# Patient Record
Sex: FEMALE | Race: WHITE | NOT HISPANIC OR LATINO | Employment: OTHER | URBAN - METROPOLITAN AREA
[De-identification: names, ages, dates, MRNs, and addresses within clinical notes are randomized per-mention and may not be internally consistent; named-entity substitution may affect disease eponyms.]

---

## 2017-01-24 RX ORDER — ESTRADIOL 0.1 MG/D
FILM, EXTENDED RELEASE TRANSDERMAL
Qty: 8 PATCH | Refills: 11 | Status: SHIPPED | OUTPATIENT
Start: 2017-01-24 | End: 2018-01-11 | Stop reason: SDUPTHER

## 2017-01-24 NOTE — TELEPHONE ENCOUNTER
----- Message from Fatoumata Medellin sent at 1/24/2017 11:14 AM CST -----  Contact: [phone]  Pt is asking for her rx of Vivelle-Dot  0.1 MG Patch be sent in to     [facility] [place] [facility]  [address] [facility] [address] [place] [address]  Phone: [phone]   Fax: [phone]    Pt is also requesting that in the future that all her rx be sent to this pharmacy/thanks

## 2017-01-25 ENCOUNTER — TELEPHONE (OUTPATIENT)
Dept: FAMILY MEDICINE | Facility: CLINIC | Age: 72
End: 2017-01-25

## 2017-01-25 NOTE — TELEPHONE ENCOUNTER
Assuming she is on januvia 100mg qd-this is not low enough.Since she had side effects to glipizide, metformin and  Actos, would she be interested in trying   An injectable like victoza?

## 2017-01-25 NOTE — TELEPHONE ENCOUNTER
----- Message from Aylin Mendoza MA sent at 1/25/2017  9:17 AM CST -----  BS readings: 134- 247    134, 145,150, 168, 147, 142, 160, 140, 139, 152, 149, 184, 172, 190, 160, 141, 197, 163, 160, 139, 185, 164, 169, 165, 166, 193, 180, 169, 161, 182, 154, 192, 184, 159, 188, 155, 177, 181, 184, 194, 183, 191, 247, 185, 161, 175, 161, 174, 165, 163, 209, 162, 172, 191, 167, 158, 193, 231, 198, 1891, 1874, 173, 183, 178, 170, 174, 175, 197

## 2017-02-24 NOTE — TELEPHONE ENCOUNTER
----- Message from Luther Ackerman sent at 2/24/2017 11:57 AM CST -----  Contact: self [phone]  Pt needs a refill on her Bisoprolol and Accu-Chek test strips sent to [facility] in [place]/[facility] [place] call/thanks

## 2017-02-26 RX ORDER — BISOPROLOL FUMARATE AND HYDROCHLOROTHIAZIDE 10; 6.25 MG/1; MG/1
1 TABLET ORAL DAILY
Qty: 90 TABLET | Refills: 3 | Status: SHIPPED | OUTPATIENT
Start: 2017-02-26 | End: 2018-01-11

## 2017-03-10 ENCOUNTER — PATIENT OUTREACH (OUTPATIENT)
Dept: ADMINISTRATIVE | Facility: HOSPITAL | Age: 72
End: 2017-03-10

## 2017-03-10 NOTE — PROGRESS NOTES
Diabetic eye exam has been completed and faxed to [facility] as attestation documentation for Diabetes Care-Eye Exam. Measure has been satisfied.

## 2017-04-07 DIAGNOSIS — E11.9 TYPE 2 DIABETES MELLITUS WITHOUT COMPLICATION: ICD-10-CM

## 2017-07-14 DIAGNOSIS — E11.9 DIABETES MELLITUS WITHOUT COMPLICATION: ICD-10-CM

## 2017-07-24 ENCOUNTER — OUTPATIENT CASE MANAGEMENT (OUTPATIENT)
Dept: ADMINISTRATIVE | Facility: OTHER | Age: 72
End: 2017-07-24

## 2017-07-24 NOTE — PROGRESS NOTES
Please note the following patient has been assigned to Trice Amos RN in Outpatient Case Management for Diabetes Disease Management with a hbA1C greater than 10.0.    Please contact [facility] at Ext. [phone] with any questions.    Thank you,    Chrystal Wilkes, SSC

## 2017-08-10 ENCOUNTER — OUTPATIENT CASE MANAGEMENT (OUTPATIENT)
Dept: ADMINISTRATIVE | Facility: OTHER | Age: 72
End: 2017-08-10

## 2017-10-09 ENCOUNTER — OFFICE VISIT (OUTPATIENT)
Dept: DERMATOLOGY | Facility: CLINIC | Age: 72
End: 2017-10-09
Payer: MEDICARE

## 2017-10-09 DIAGNOSIS — L90.5 SCAR: ICD-10-CM

## 2017-10-09 DIAGNOSIS — D18.00 ANGIOMA: ICD-10-CM

## 2017-10-09 DIAGNOSIS — L81.4 LENTIGO: ICD-10-CM

## 2017-10-09 DIAGNOSIS — L57.0 AK (ACTINIC KERATOSIS): Primary | ICD-10-CM

## 2017-10-09 DIAGNOSIS — L82.1 SK (SEBORRHEIC KERATOSIS): ICD-10-CM

## 2017-10-09 DIAGNOSIS — Z85.828 PERSONAL HISTORY OF SKIN CANCER: ICD-10-CM

## 2017-10-09 PROCEDURE — 99999 PR PBB SHADOW E&M-EST. PATIENT-LVL II: CPT | Mod: PBBFAC,,, | Performed by: DERMATOLOGY

## 2017-10-09 PROCEDURE — 99213 OFFICE O/P EST LOW 20 MIN: CPT | Mod: 25,S$GLB,, | Performed by: DERMATOLOGY

## 2017-10-09 PROCEDURE — 17000 DESTRUCT PREMALG LESION: CPT | Mod: S$GLB,,, | Performed by: DERMATOLOGY

## 2017-10-09 PROCEDURE — 17003 DESTRUCT PREMALG LES 2-14: CPT | Mod: S$GLB,,, | Performed by: DERMATOLOGY

## 2017-10-09 RX ORDER — FLUOROURACIL 50 MG/G
CREAM TOPICAL
Qty: 40 G | Refills: 1 | Status: SHIPPED | OUTPATIENT
Start: 2017-10-09 | End: 2018-07-12

## 2017-10-09 NOTE — PROGRESS NOTES
Subjective:       Patient ID:  Saumya Braun is a 72 y.o. female who presents for   Chief Complaint   Patient presents with    Skin Check     hgih risk pt with hx several SCC skin cancers here to check for the development of new lesions on upper body.  C/o recurrently scalp patch crown of scalp x 3 months.  Not painful or tender. Raised and scaly. Recurs. Not healing.   No tx.  Has hx of NMSC in scalp but more in front of scaly.         Review of Systems   Constitutional: Negative for fever, chills, fatigue and malaise.   Skin: Positive for activity-related sunscreen use. Negative for daily sunscreen use.   Hematologic/Lymphatic: Does not bruise/bleed easily.        Objective:    Physical Exam   Constitutional: She appears well-developed and well-nourished. No distress.   Neurological: She is alert and oriented to person, place, and time. She is not disoriented.   Psychiatric: She has a normal mood and affect.   Skin:   Areas Examined (abnormalities noted in diagram):   Scalp / Hair Palpated and Inspected  Head / Face Inspection Performed  Neck Inspection Performed  Chest / Axilla Inspection Performed  Abdomen Inspection Performed  Back Inspection Performed  RUE Inspected  LUE Inspection Performed  Nails and Digits Inspection Performed                       Diagram Legend     Erythematous scaling macule/papule c/w actinic keratosis       Vascular papule c/w angioma      Pigmented verrucoid papule/plaque c/w seborrheic keratosis      Yellow umbilicated papule c/w sebaceous hyperplasia      Irregularly shaped tan macule c/w lentigo     1-2 mm smooth white papules consistent with Milia      Movable subcutaneous cyst with punctum c/w epidermal inclusion cyst      Subcutaneous movable cyst c/w pilar cyst      Firm pink to brown papule c/w dermatofibroma      Pedunculated fleshy papule(s) c/w skin tag(s)      Evenly pigmented macule c/w junctional nevus     Mildly variegated pigmented, slightly  irregular-bordered macule c/w mildly atypical nevus      Flesh colored to evenly pigmented papule c/w intradermal nevus       Pink pearly papule/plaque c/w basal cell carcinoma      Erythematous hyperkeratotic cursted plaque c/w SCC      Surgical scar with no sign of skin cancer recurrence      Open and closed comedones      Inflammatory papules and pustules      Verrucoid papule consistent consistent with wart     Erythematous eczematous patches and plaques     Dystrophic onycholytic nail with subungual debris c/w onychomycosis     Umbilicated papule    Erythematous-base heme-crusted tan verrucoid plaque consistent with inflamed seborrheic keratosis     Erythematous Silvery Scaling Plaque c/w Psoriasis     See annotation      Assessment / Plan:        AK (actinic keratosis)  Cryosurgery Procedure Note    Verbal consent from the patient is obtained and the patient is aware of the precancerous quality and need for treatment of these lesions. Liquid nitrogen cryosurgery is applied to the 6 actinic keratoses, as detailed in the physical exam, to produce a freeze injury. The patient is aware that blisters may form and is instructed on wound care with gentle cleansing and use of vaseline ointment to keep moist until healed. The patient is supplied a handout on cryosurgery and is instructed to call if lesions do not completely resolve.    -     fluorouracil (EFUDEX) 5 % cream; Apply thin film to nose 2times per day for 2-3 weeks; d/c if area bleeding or ulcerated; avoid eyes or mouth  Dispense: 40 g; Refill: 1    Lentigo  This is a benign hyperpigmented sun induced lesion. Daily sun protection will reduce the number of new lesions. Treatment of these benign lesions are considered cosmetic.  The nature of sun-induced photo-aging and skin cancers is discussed.  Sun avoidance, protective clothing, and the use of 30-SPF sunscreens is advised. Observe closely for skin damage/changes, and call if such occurs.    Angioma  These  are benign vascular lesions that are inherited.  Treatment is not necessary.    SK (seborrheic keratosis)  These are benign inherited growths without a malignant potential. Reassurance given to patient. No treatment is necessary.     Personal history of skin cancer  Scar  Area(s) of previous NMSC evaluated with no signs of recurrence.    Upper body skin examination performed today including at least 6 points as noted in physical examination. No lesions suspicious for malignancy noted.             Return in about 3 months (around 1/9/2018) for recheck scalp, nose, consider face pdt.

## 2017-10-16 DIAGNOSIS — E11.9 DIABETES MELLITUS WITHOUT COMPLICATION: ICD-10-CM

## 2017-11-07 ENCOUNTER — TELEPHONE (OUTPATIENT)
Dept: FAMILY MEDICINE | Facility: CLINIC | Age: 72
End: 2017-11-07

## 2017-11-07 DIAGNOSIS — E11.9 DIABETES MELLITUS WITHOUT COMPLICATION: ICD-10-CM

## 2017-11-07 DIAGNOSIS — I10 ESSENTIAL HYPERTENSION: Primary | ICD-10-CM

## 2017-11-07 NOTE — TELEPHONE ENCOUNTER
----- Message from Josie Perkins MA sent at 11/7/2017  2:35 PM CST -----  Pt would like to speak to nurse to set up labs prior to physical on 1/11/17.Thanks for signing up for [facility]!    To access your account, go to [email or web address] and enter your [facility] Username and password.    If you have forgotten your Username and/or password, click the Forgot [facility] Username? or Forgot Password? links to recover your login information.    If you have any further difficulties accessing your account, please call [facility] # or email [email or web address].    [facility] Username: _____________________    [facility] Password: _____________________   you

## 2017-11-13 ENCOUNTER — PATIENT OUTREACH (OUTPATIENT)
Dept: ADMINISTRATIVE | Facility: HOSPITAL | Age: 72
End: 2017-11-13

## 2017-11-13 NOTE — LETTER
November 13, 2017    Saumyatete Jhaveri Kade  [address] [place] [address]           [facility]  [address]  [place] [address]  Phone: [phone] Dear Mrs. Braun:    [facility] is committed to your overall health.  To help you get the most out of each of your visits, we will review your information to make sure you are up to date on all of your recommended tests and/or procedures.      David Kang MD has found that you may be due for   Health Maintenance Due   Topic    TETANUS VACCINE     Mammogram     DEXA SCAN     Colonoscopy     Zoster Vaccine     Pneumococcal (65+) (2 of 2 - PPSV23)    Influenza Vaccine     Eye Exam     Foot Exam     If you have had any of the above done at another facility, please bring the records or information with you so that your record at [facility] will be complete.    If you are currently taking medication, please bring it with you to your appointment for review.    We will be happy to assist you with scheduling any necessary appointments or you may contact the [facility] appointment desk at [phone] to schedule at your convenience.   Thank you for choosing [facility] for your healthcare needs,    If you have any questions or concerns, please don't hesitate to call.    Sincerely,    YEMI Valdez  Care Coordination Department  [facility]  [phone]

## 2017-11-13 NOTE — PROGRESS NOTES
Spoke with pt concerning eye exam over due.  Pt stated she will schedule eye exam.  Offered to scheduled eye exam for pt but stated she will take care of it.  Pre visit chart audit letter sent. Pre visit chart audit letter sent.

## 2018-01-05 ENCOUNTER — LAB VISIT (OUTPATIENT)
Dept: LAB | Facility: HOSPITAL | Age: 73
End: 2018-01-05
Attending: FAMILY MEDICINE
Payer: MEDICARE

## 2018-01-05 DIAGNOSIS — I10 ESSENTIAL HYPERTENSION: ICD-10-CM

## 2018-01-05 DIAGNOSIS — E11.9 DIABETES MELLITUS WITHOUT COMPLICATION: ICD-10-CM

## 2018-01-05 LAB
ALBUMIN SERPL BCP-MCNC: 3.7 G/DL
ALP SERPL-CCNC: 50 U/L
ALT SERPL W/O P-5'-P-CCNC: 38 U/L
ANION GAP SERPL CALC-SCNC: 10 MMOL/L
AST SERPL-CCNC: 26 U/L
BASOPHILS # BLD AUTO: 0.07 K/UL
BASOPHILS NFR BLD: 1 %
BILIRUB SERPL-MCNC: 0.5 MG/DL
BUN SERPL-MCNC: 14 MG/DL
CALCIUM SERPL-MCNC: 9.7 MG/DL
CHLORIDE SERPL-SCNC: 102 MMOL/L
CHOLEST SERPL-MCNC: 210 MG/DL
CHOLEST/HDLC SERPL: 4.8 {RATIO}
CO2 SERPL-SCNC: 27 MMOL/L
CREAT SERPL-MCNC: 0.8 MG/DL
DIFFERENTIAL METHOD: NORMAL
EOSINOPHIL # BLD AUTO: 0.3 K/UL
EOSINOPHIL NFR BLD: 4.5 %
ERYTHROCYTE [DISTWIDTH] IN BLOOD BY AUTOMATED COUNT: 13.1 %
EST. GFR  (AFRICAN AMERICAN): >60 ML/MIN/1.73 M^2
EST. GFR  (NON AFRICAN AMERICAN): >60 ML/MIN/1.73 M^2
ESTIMATED AVG GLUCOSE: 189 MG/DL
GLUCOSE SERPL-MCNC: 193 MG/DL
HBA1C MFR BLD HPLC: 8.2 %
HCT VFR BLD AUTO: 43.3 %
HDLC SERPL-MCNC: 44 MG/DL
HDLC SERPL: 21 %
HGB BLD-MCNC: 14 G/DL
IMM GRANULOCYTES # BLD AUTO: 0.01 K/UL
IMM GRANULOCYTES NFR BLD AUTO: 0.1 %
LDLC SERPL CALC-MCNC: 111 MG/DL
LYMPHOCYTES # BLD AUTO: 2.6 K/UL
LYMPHOCYTES NFR BLD: 37.3 %
MCH RBC QN AUTO: 29.9 PG
MCHC RBC AUTO-ENTMCNC: 32.3 G/DL
MCV RBC AUTO: 92 FL
MONOCYTES # BLD AUTO: 0.4 K/UL
MONOCYTES NFR BLD: 6.2 %
NEUTROPHILS # BLD AUTO: 3.5 K/UL
NEUTROPHILS NFR BLD: 50.9 %
NONHDLC SERPL-MCNC: 166 MG/DL
NRBC BLD-RTO: 0 /100 WBC
PLATELET # BLD AUTO: 233 K/UL
PMV BLD AUTO: 9.8 FL
POTASSIUM SERPL-SCNC: 4.3 MMOL/L
PROT SERPL-MCNC: 7.5 G/DL
RBC # BLD AUTO: 4.69 M/UL
SODIUM SERPL-SCNC: 139 MMOL/L
TRIGL SERPL-MCNC: 275 MG/DL
WBC # BLD AUTO: 6.91 K/UL

## 2018-01-05 PROCEDURE — 80061 LIPID PANEL: CPT

## 2018-01-05 PROCEDURE — 80053 COMPREHEN METABOLIC PANEL: CPT

## 2018-01-05 PROCEDURE — 85025 COMPLETE CBC W/AUTO DIFF WBC: CPT

## 2018-01-05 PROCEDURE — 36415 COLL VENOUS BLD VENIPUNCTURE: CPT | Mod: PO

## 2018-01-05 PROCEDURE — 83036 HEMOGLOBIN GLYCOSYLATED A1C: CPT

## 2018-01-11 ENCOUNTER — OFFICE VISIT (OUTPATIENT)
Dept: FAMILY MEDICINE | Facility: CLINIC | Age: 73
End: 2018-01-11
Payer: MEDICARE

## 2018-01-11 VITALS
BODY MASS INDEX: 26.66 KG/M2 | SYSTOLIC BLOOD PRESSURE: 125 MMHG | TEMPERATURE: 98 F | DIASTOLIC BLOOD PRESSURE: 57 MMHG | WEIGHT: 135.81 LBS | HEIGHT: 60 IN | HEART RATE: 66 BPM

## 2018-01-11 DIAGNOSIS — E11.59 HYPERTENSION ASSOCIATED WITH DIABETES: ICD-10-CM

## 2018-01-11 DIAGNOSIS — E11.9 TYPE 2 DIABETES MELLITUS WITHOUT COMPLICATION, WITHOUT LONG-TERM CURRENT USE OF INSULIN: ICD-10-CM

## 2018-01-11 DIAGNOSIS — I10 ESSENTIAL HYPERTENSION: ICD-10-CM

## 2018-01-11 DIAGNOSIS — I15.2 HYPERTENSION ASSOCIATED WITH DIABETES: ICD-10-CM

## 2018-01-11 DIAGNOSIS — J06.9 UPPER RESPIRATORY TRACT INFECTION, UNSPECIFIED TYPE: Primary | ICD-10-CM

## 2018-01-11 DIAGNOSIS — I10 BENIGN ESSENTIAL HTN: ICD-10-CM

## 2018-01-11 PROCEDURE — 99499 UNLISTED E&M SERVICE: CPT | Mod: S$GLB,,, | Performed by: FAMILY MEDICINE

## 2018-01-11 PROCEDURE — 99999 PR PBB SHADOW E&M-EST. PATIENT-LVL III: CPT | Mod: PBBFAC,,, | Performed by: FAMILY MEDICINE

## 2018-01-11 PROCEDURE — 99397 PER PM REEVAL EST PAT 65+ YR: CPT | Mod: S$GLB,,, | Performed by: FAMILY MEDICINE

## 2018-01-11 RX ORDER — FLUTICASONE PROPIONATE 50 MCG
2 SPRAY, SUSPENSION (ML) NASAL DAILY
Qty: 16 G | Refills: 6 | Status: SHIPPED | OUTPATIENT
Start: 2018-01-11 | End: 2018-01-11 | Stop reason: SDUPTHER

## 2018-01-11 RX ORDER — ALBUTEROL SULFATE 90 UG/1
2 AEROSOL, METERED RESPIRATORY (INHALATION) EVERY 6 HOURS PRN
Qty: 18 G | Refills: 6 | Status: SHIPPED | OUTPATIENT
Start: 2018-01-11 | End: 2018-01-11 | Stop reason: SDUPTHER

## 2018-01-11 RX ORDER — NATEGLINIDE 60 MG/1
60 TABLET ORAL
Qty: 270 TABLET | Refills: 3 | Status: SHIPPED | OUTPATIENT
Start: 2018-01-11 | End: 2018-07-12

## 2018-01-11 RX ORDER — ACETAMINOPHEN 325 MG/10.15ML
LIQUID ORAL
COMMUNITY
Start: 2017-11-10 | End: 2018-08-06

## 2018-01-11 RX ORDER — FLUTICASONE PROPIONATE 50 MCG
2 SPRAY, SUSPENSION (ML) NASAL DAILY
Qty: 16 G | Refills: 6 | Status: SHIPPED | OUTPATIENT
Start: 2018-01-11 | End: 2022-01-25 | Stop reason: SDUPTHER

## 2018-01-11 RX ORDER — ESTRADIOL 0.1 MG/D
FILM, EXTENDED RELEASE TRANSDERMAL
Qty: 8 PATCH | Refills: 11 | Status: SHIPPED | OUTPATIENT
Start: 2018-01-11 | End: 2018-07-12 | Stop reason: SDUPTHER

## 2018-01-11 RX ORDER — BISOPROLOL FUMARATE AND HYDROCHLOROTHIAZIDE 10; 6.25 MG/1; MG/1
1 TABLET ORAL DAILY
Qty: 90 TABLET | Refills: 1 | Status: SHIPPED | OUTPATIENT
Start: 2018-01-11 | End: 2018-07-12

## 2018-01-11 RX ORDER — ALBUTEROL SULFATE 90 UG/1
2 AEROSOL, METERED RESPIRATORY (INHALATION) EVERY 6 HOURS PRN
Qty: 18 G | Refills: 6 | Status: SHIPPED | OUTPATIENT
Start: 2018-01-11 | End: 2020-09-09

## 2018-01-11 NOTE — PROGRESS NOTES
The patient presents today to fu DM better but not controlled a1c 8.2 BP stable    Past Medical History:  Past Medical History:   Diagnosis Date    Cancer     Cataract     OU    Hypertension     PONV (postoperative nausea and vomiting)     SCC (squamous cell carcinoma) EXCISED  VIA MOHS    MID UPPER FOREHEAD    SQUAMOUS CELL CARCINOMA     right scalp and mid nasal bridge    Type 2 diabetes mellitus      Past Surgical History:   Procedure Laterality Date    HYSTERECTOMY      SKIN BIOPSY       Review of patient's allergies indicates:  No Known Allergies  Current Outpatient Prescriptions on File Prior to Visit   Medication Sig Dispense Refill    bisoprolol-hydrochlorothiazide (ZIAC) 10-6.25 mg per tablet TAKE ONE TABLET BY MOUTH ONCE DAILY. 90 tablet 1    blood sugar diagnostic Strp To test blood sugar once daily 300 each 3    estradiol (VIVELLE-DOT) 0.1 mg/24 hr PTSW 1 patck 2x week 8 patch 11    SITagliptin (JANUVIA) 100 MG Tab Take 1 tablet (100 mg total) by mouth once daily. 30 tablet 12    fluorouracil (EFUDEX) 5 % cream Apply thin film to nose 2times per day for 2-3 weeks; d/c if area bleeding or ulcerated; avoid eyes or mouth 40 g 1    [DISCONTINUED] bisoprolol-hydrochlorothiazide (ZIAC) 10-6.25 mg per tablet Take 1 tablet by mouth once daily. Every day 90 tablet 3     No current facility-administered medications on file prior to visit.      Social History     Social History    Marital status:      Spouse name: N/A    Number of children: N/A    Years of education: N/A     Occupational History    Not on file.     Social History Main Topics    Smoking status: Never Smoker    Smokeless tobacco: Never Used    Alcohol use No    Drug use: No    Sexual activity: Not on file     Other Topics Concern    Not on file     Social History Narrative    No narrative on file     Family History   Problem Relation Age of Onset    Skin cancer Neg Hx     Melanoma Neg Hx          ROS:GENERAL:  No fever, chills, fatigability or weight loss.  SKIN: No rashes, itching or changes in color or texture of skin.  HEAD: No headaches or recent head trauma.EYES: Visual acuity fine. No photophobia, ocular pain or diplopia.EARS: Denies ear pain, discharge or vertigo.NOSE: No loss of smell, no epistaxis or postnasal drip.MOUTH & THROAT: No hoarseness or change in voice. No excessive gum bleeding.NODES: Denies swollen glands.  CHEST: Denies CASTORENA, cyanosis, wheezing, cough and sputum production.  CARDIOVASCULAR: Denies chest pain, PND, orthopnea or reduced exercise tolerance.  ABDOMEN: Appetite fine. No weight loss. Denies diarrhea, abdominal pain, hematemesis or blood in stool.  URINARY: No flank pain, dysuria or hematuria.  PERIPHERAL VASCULAR: No claudication or cyanosis.  MUSCULOSKELETAL: See above.  NEUROLOGIC: No history of seizures, paralysis, alteration of gait or coordination.  PE:   HEAD: Normocephalic, atraumatic.EYES: PERRL. EOMI.   EARS: TM's intact. Light reflex normal. No retraction or perforation.   NOSE: Mucosa pink. Airway clear.MOUTH & THROAT: No tonsillar enlargement. No pharyngeal erythema or exudate. No stridor.  NODES: No cervical, axillary or inguinal lymph node enlargement.  CHEST: Lungs clear to auscultation.  CARDIOVASCULAR: Normal S1, S2. No rubs, murmurs or gallops.  ABDOMEN: Bowel sounds normal. Not distended. Soft. No tenderness or masses.  MUSCULOSKELETAL: No palpable abnormality  NEUROLOGIC: Cranial Nerves: II-XII grossly intact.  Motor: 5/5 strength major flexors/extensors.  DTR's: Knees, Ankles 2+ and equal bilaterally; downgoing toes.  Sensory: Intact to light touch distally.  Gait & Posture: Normal gait and fine motion. No cerebellar signs.     Impression:URI  DM  HLP    Plan:Lab eval  Rec diet and ex recs  Rec dc januvia add starlix 60 tid

## 2018-01-12 ENCOUNTER — TELEPHONE (OUTPATIENT)
Dept: FAMILY MEDICINE | Facility: CLINIC | Age: 73
End: 2018-01-12

## 2018-01-12 ENCOUNTER — PATIENT OUTREACH (OUTPATIENT)
Dept: ADMINISTRATIVE | Facility: HOSPITAL | Age: 73
End: 2018-01-12

## 2018-01-12 DIAGNOSIS — E11.9 TYPE 2 DIABETES MELLITUS WITHOUT COMPLICATION, UNSPECIFIED LONG TERM INSULIN USE STATUS: Primary | ICD-10-CM

## 2018-01-19 NOTE — PROGRESS NOTES
Eye Exam report received from Dr. Colón office performed 12/12/2017.  HM updated and report sent to scanning.

## 2018-02-17 RX ORDER — BISOPROLOL FUMARATE AND HYDROCHLOROTHIAZIDE 10; 6.25 MG/1; MG/1
TABLET ORAL
Qty: 90 TABLET | Refills: 3 | Status: SHIPPED | OUTPATIENT
Start: 2018-02-17 | End: 2018-07-12 | Stop reason: SDUPTHER

## 2018-03-19 ENCOUNTER — PES CALL (OUTPATIENT)
Dept: ADMINISTRATIVE | Facility: CLINIC | Age: 73
End: 2018-03-19

## 2018-03-19 ENCOUNTER — INITIAL CONSULT (OUTPATIENT)
Dept: OPHTHALMOLOGY | Facility: CLINIC | Age: 73
End: 2018-03-19
Payer: MEDICARE

## 2018-03-19 VITALS — HEART RATE: 61 BPM | SYSTOLIC BLOOD PRESSURE: 161 MMHG | DIASTOLIC BLOOD PRESSURE: 71 MMHG

## 2018-03-19 DIAGNOSIS — H35.3132 NONEXUDATIVE AGE-RELATED MACULAR DEGENERATION, BILATERAL, INTERMEDIATE DRY STAGE: Primary | ICD-10-CM

## 2018-03-19 DIAGNOSIS — H35.52 RETINITIS PIGMENTOSA, BOTH EYES: ICD-10-CM

## 2018-03-19 DIAGNOSIS — H35.30 ARMD (AGE RELATED MACULAR DEGENERATION): ICD-10-CM

## 2018-03-19 DIAGNOSIS — H25.13 NS (NUCLEAR SCLEROSIS), BILATERAL: ICD-10-CM

## 2018-03-19 PROCEDURE — 92134 CPTRZ OPH DX IMG PST SGM RTA: CPT | Mod: S$GLB,,, | Performed by: OPHTHALMOLOGY

## 2018-03-19 PROCEDURE — 92225 PR SPECIAL EYE EXAM, INITIAL: CPT | Mod: 50,S$GLB,, | Performed by: OPHTHALMOLOGY

## 2018-03-19 PROCEDURE — 99999 PR PBB SHADOW E&M-EST. PATIENT-LVL III: CPT | Mod: PBBFAC,,, | Performed by: OPHTHALMOLOGY

## 2018-03-19 PROCEDURE — 92004 COMPRE OPH EXAM NEW PT 1/>: CPT | Mod: S$GLB,,, | Performed by: OPHTHALMOLOGY

## 2018-03-19 NOTE — PROGRESS NOTES
HPI     Eye Problem    Additional comments: AMD           Comments   Patient here for evaluation AMD per Jess Colón OD. She hasn't noticed any   changes in her vision. She states she is not diabetic just insulin   resistant. She controls BS by watching what she eats.     LBS=160 this am  A1C=8.2 (1/5/18)       Last edited by Aylin Ko on 3/19/2018  9:29 AM. 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 Dry AMD component  Continue AREDS/AG    3. NS OU  Monitor      1 year OCT

## 2018-06-28 ENCOUNTER — PATIENT OUTREACH (OUTPATIENT)
Dept: ADMINISTRATIVE | Facility: HOSPITAL | Age: 73
End: 2018-06-28

## 2018-07-06 ENCOUNTER — LAB VISIT (OUTPATIENT)
Dept: LAB | Facility: HOSPITAL | Age: 73
End: 2018-07-06
Attending: FAMILY MEDICINE
Payer: MEDICARE

## 2018-07-06 DIAGNOSIS — E11.9 TYPE 2 DIABETES MELLITUS WITHOUT COMPLICATION: ICD-10-CM

## 2018-07-06 LAB
ALBUMIN SERPL BCP-MCNC: 3.9 G/DL
ALP SERPL-CCNC: 47 U/L
ALT SERPL W/O P-5'-P-CCNC: 36 U/L
ANION GAP SERPL CALC-SCNC: 12 MMOL/L
AST SERPL-CCNC: 30 U/L
BILIRUB SERPL-MCNC: 0.7 MG/DL
BUN SERPL-MCNC: 13 MG/DL
CALCIUM SERPL-MCNC: 9.7 MG/DL
CHLORIDE SERPL-SCNC: 101 MMOL/L
CHOLEST SERPL-MCNC: 207 MG/DL
CHOLEST/HDLC SERPL: 5 {RATIO}
CO2 SERPL-SCNC: 26 MMOL/L
CREAT SERPL-MCNC: 0.8 MG/DL
EST. GFR  (AFRICAN AMERICAN): >60 ML/MIN/1.73 M^2
EST. GFR  (NON AFRICAN AMERICAN): >60 ML/MIN/1.73 M^2
ESTIMATED AVG GLUCOSE: 203 MG/DL
GLUCOSE SERPL-MCNC: 184 MG/DL
HBA1C MFR BLD HPLC: 8.7 %
HDLC SERPL-MCNC: 41 MG/DL
HDLC SERPL: 19.8 %
LDLC SERPL CALC-MCNC: 111.8 MG/DL
NONHDLC SERPL-MCNC: 166 MG/DL
POTASSIUM SERPL-SCNC: 3.7 MMOL/L
PROT SERPL-MCNC: 7.1 G/DL
SODIUM SERPL-SCNC: 139 MMOL/L
TRIGL SERPL-MCNC: 271 MG/DL

## 2018-07-06 PROCEDURE — 80061 LIPID PANEL: CPT

## 2018-07-06 PROCEDURE — 83036 HEMOGLOBIN GLYCOSYLATED A1C: CPT

## 2018-07-06 PROCEDURE — 80053 COMPREHEN METABOLIC PANEL: CPT

## 2018-07-06 PROCEDURE — 36415 COLL VENOUS BLD VENIPUNCTURE: CPT | Mod: PO

## 2018-07-12 ENCOUNTER — OFFICE VISIT (OUTPATIENT)
Dept: FAMILY MEDICINE | Facility: CLINIC | Age: 73
End: 2018-07-12
Payer: MEDICARE

## 2018-07-12 VITALS
DIASTOLIC BLOOD PRESSURE: 64 MMHG | WEIGHT: 135 LBS | HEIGHT: 61 IN | SYSTOLIC BLOOD PRESSURE: 136 MMHG | TEMPERATURE: 98 F | BODY MASS INDEX: 25.49 KG/M2 | HEART RATE: 62 BPM

## 2018-07-12 DIAGNOSIS — I10 BENIGN ESSENTIAL HTN: ICD-10-CM

## 2018-07-12 DIAGNOSIS — E78.5 HYPERLIPIDEMIA, UNSPECIFIED HYPERLIPIDEMIA TYPE: ICD-10-CM

## 2018-07-12 PROCEDURE — 99499 UNLISTED E&M SERVICE: CPT | Mod: HCNC,S$GLB,, | Performed by: FAMILY MEDICINE

## 2018-07-12 PROCEDURE — 99214 OFFICE O/P EST MOD 30 MIN: CPT | Mod: S$GLB,,, | Performed by: FAMILY MEDICINE

## 2018-07-12 PROCEDURE — 99999 PR PBB SHADOW E&M-EST. PATIENT-LVL III: CPT | Mod: PBBFAC,,, | Performed by: FAMILY MEDICINE

## 2018-07-12 PROCEDURE — 3075F SYST BP GE 130 - 139MM HG: CPT | Mod: CPTII,S$GLB,, | Performed by: FAMILY MEDICINE

## 2018-07-12 PROCEDURE — 3078F DIAST BP <80 MM HG: CPT | Mod: CPTII,S$GLB,, | Performed by: FAMILY MEDICINE

## 2018-07-12 PROCEDURE — 3045F PR MOST RECENT HEMOGLOBIN A1C LEVEL 7.0-9.0%: CPT | Mod: CPTII,S$GLB,, | Performed by: FAMILY MEDICINE

## 2018-07-12 RX ORDER — ESTRADIOL 0.1 MG/D
FILM, EXTENDED RELEASE TRANSDERMAL
Qty: 8 PATCH | Refills: 11 | Status: SHIPPED | OUTPATIENT
Start: 2018-07-12 | End: 2019-07-30 | Stop reason: SDUPTHER

## 2018-07-12 RX ORDER — SIMVASTATIN 5 MG/1
5 TABLET, FILM COATED ORAL NIGHTLY
Qty: 90 TABLET | Refills: 3 | Status: SHIPPED | OUTPATIENT
Start: 2018-07-12 | End: 2020-02-11

## 2018-07-12 RX ORDER — METFORMIN HYDROCHLORIDE 500 MG/1
500 TABLET, EXTENDED RELEASE ORAL NIGHTLY
Qty: 90 TABLET | Refills: 3 | Status: SHIPPED | OUTPATIENT
Start: 2018-07-12 | End: 2019-04-25

## 2018-07-12 RX ORDER — BISOPROLOL FUMARATE AND HYDROCHLOROTHIAZIDE 10; 6.25 MG/1; MG/1
TABLET ORAL
Qty: 90 TABLET | Refills: 3 | Status: SHIPPED | OUTPATIENT
Start: 2018-07-12 | End: 2019-07-30 | Stop reason: SDUPTHER

## 2018-07-12 RX ORDER — METHYLPREDNISOLONE 4 MG/1
TABLET ORAL
Qty: 1 PACKAGE | Refills: 0 | Status: SHIPPED | OUTPATIENT
Start: 2018-07-12 | End: 2018-08-02

## 2018-07-25 ENCOUNTER — PES CALL (OUTPATIENT)
Dept: ADMINISTRATIVE | Facility: CLINIC | Age: 73
End: 2018-07-25

## 2018-08-06 ENCOUNTER — OFFICE VISIT (OUTPATIENT)
Dept: FAMILY MEDICINE | Facility: CLINIC | Age: 73
End: 2018-08-06
Payer: MEDICARE

## 2018-08-06 VITALS
SYSTOLIC BLOOD PRESSURE: 130 MMHG | HEIGHT: 61 IN | TEMPERATURE: 98 F | HEART RATE: 60 BPM | WEIGHT: 133.81 LBS | BODY MASS INDEX: 25.27 KG/M2 | DIASTOLIC BLOOD PRESSURE: 70 MMHG

## 2018-08-06 DIAGNOSIS — H35.3132 NONEXUDATIVE AGE-RELATED MACULAR DEGENERATION, BILATERAL, INTERMEDIATE DRY STAGE: ICD-10-CM

## 2018-08-06 DIAGNOSIS — E11.59 HYPERTENSION ASSOCIATED WITH DIABETES: ICD-10-CM

## 2018-08-06 DIAGNOSIS — E78.5 HYPERLIPIDEMIA, UNSPECIFIED HYPERLIPIDEMIA TYPE: ICD-10-CM

## 2018-08-06 DIAGNOSIS — E11.9 TYPE 2 DIABETES MELLITUS WITHOUT COMPLICATION, WITHOUT LONG-TERM CURRENT USE OF INSULIN: ICD-10-CM

## 2018-08-06 DIAGNOSIS — H25.13 NS (NUCLEAR SCLEROSIS), BILATERAL: ICD-10-CM

## 2018-08-06 DIAGNOSIS — I15.2 HYPERTENSION ASSOCIATED WITH DIABETES: ICD-10-CM

## 2018-08-06 DIAGNOSIS — H35.52 RETINITIS PIGMENTOSA, BOTH EYES: ICD-10-CM

## 2018-08-06 DIAGNOSIS — Z23 IMMUNIZATION DUE: ICD-10-CM

## 2018-08-06 PROCEDURE — 90732 PPSV23 VACC 2 YRS+ SUBQ/IM: CPT | Mod: S$GLB,,, | Performed by: NURSE PRACTITIONER

## 2018-08-06 PROCEDURE — G0009 ADMIN PNEUMOCOCCAL VACCINE: HCPCS | Mod: S$GLB,,, | Performed by: NURSE PRACTITIONER

## 2018-08-06 PROCEDURE — 3045F PR MOST RECENT HEMOGLOBIN A1C LEVEL 7.0-9.0%: CPT | Mod: CPTII,S$GLB,, | Performed by: NURSE PRACTITIONER

## 2018-08-06 PROCEDURE — G0439 PPPS, SUBSEQ VISIT: HCPCS | Mod: S$GLB,,, | Performed by: NURSE PRACTITIONER

## 2018-08-06 PROCEDURE — 3075F SYST BP GE 130 - 139MM HG: CPT | Mod: CPTII,S$GLB,, | Performed by: NURSE PRACTITIONER

## 2018-08-06 PROCEDURE — 3078F DIAST BP <80 MM HG: CPT | Mod: CPTII,S$GLB,, | Performed by: NURSE PRACTITIONER

## 2018-08-06 PROCEDURE — 99999 PR PBB SHADOW E&M-EST. PATIENT-LVL IV: CPT | Mod: PBBFAC,,, | Performed by: NURSE PRACTITIONER

## 2018-08-06 PROCEDURE — 99499 UNLISTED E&M SERVICE: CPT | Mod: S$GLB,,, | Performed by: NURSE PRACTITIONER

## 2018-08-06 NOTE — PATIENT INSTRUCTIONS
Diet: Diabetes  Food is an important tool that you can use to control diabetes and stay healthy. Eating well-balanced meals in the correct amounts will help you control your blood glucose levels and prevent low blood sugar reactions. It will also help you reduce the health risks of diabetes. There is no one specific diet that is right for everyone with diabetes. But there are general guidelines to follow. A registered dietitian (RD) will create a tailored diet approach thats just right for you. He or she will also help you plan healthy meals and snacks. If you have any questions, call your dietitian for advice.  Eating Out When You Have Diabetes  Eating right is an important part of keeping your blood sugar in your target range. You just need to make healthy choices.    Tips for restaurant meals  When you eat away from home try these tips:  Try to schedule your dining-out meal at your normal meal time. Make a reservation if possible, so you don't have to wait to eat. If you can't make a reservation, try to arrive at the restaurant at a less-busy time to cut down your wait time. Eat a small fruit or starch snack at your regular mealtime if your restaurant meal is going to be later than usual.   Call ahead to see if the restaurant can meet your dietary needs if you've never been there before. Or you can go online to see the menu ahead of time.  Carry some crackers with you in case the restaurant needs you to wait until you can be served.  Ask how foods are prepared before you order.  Instead of fried, sautéed, or breaded foods, choose ones that are broiled, steamed, grilled, or baked.  Ask for sauces, gravies, and dressings on the side.  Only eat an amount that fits your meal plan. Remember: You can take home the leftovers.  Save dessert for special occasions. Then choose a small dessert or share one with a friend or family member.  Make healthy choices  Fast food  Garden salad with light dressing on the  side  Baked potato with vegetables or herbs  Broiled, roasted, or grilled chicken sandwich  Sliced turkey or lean roast beef sandwich  Mexican  Chicken enchilada, without cheese or sour cream   Small burrito with whole beans and chicken  Whole beans (not refried) and rice  Chicken or fish fajitas  Steakhouse  Grilled or broiled lean cuts of beef  Baked potato with vegetables or herbs  Broiled or baked chicken. Dont eat the skin.  Steamed vegetables  Asian  Steamed dumplings or potstickers  Broiled, boiled, or steamed meats or fish  Sushi or sashimi  Steamed rice or boiled noodles. One serving is equal to 1/3 cup.  Date Last Reviewed: 6/1/2016 © 2000-2017 [employer]. [address] [place] [address]. All rights reserved. This information is not intended as a substitute for professional medical care. Always follow your healthcare professional's instructions.        Understanding Carbohydrates, Fats, and Protein  Food is a source of fuel and nourishment for your body. Its also a source of pleasure. Having diabetes doesnt mean you have to eat special foods or give up desserts. Instead, your dietitian can show you how to plan meals to suit your body. To start, learn how different foods affect blood sugar.  Carbohydrates  Carbohydrates are the main source of fuel for the body. Carbohydrates raise blood sugar. Many people think carbohydrates are only found in pasta or bread. But carbohydrates are actually in many kinds of foods:  Sugars occur naturally in foods such as fruit, milk, honey, and molasses. Sugars can also be added to many foods, from cereals and yogurt to candy and desserts. Sugars raise blood sugar.  Starches are found in bread, cereals, pasta, and dried beans. Theyre also found in corn, peas, potatoes, yam, acorn squash, and butternut squash. Starches also raise blood sugar.   Fiber is found in foods such as vegetables, fruits, beans, and whole grains. Unlike other carbs, fiber  isnt digested or absorbed. So it doesnt raise blood sugar. In fact, fiber can help keep blood sugar from rising too fast. It also helps keep blood cholesterol at a healthy level.  Did you know?  Even though carbohydrates raise blood sugar, its best to have some in every meal. They are an important part of a healthy diet.   Fat  Fat is an energy source that can be stored until needed. Fat does not raise blood sugar. However, it can raise blood cholesterol, increasing the risk of heart disease. Fat is also high in calories, which can cause weight gain. Not all types of fat are the same.  More Healthy:  Monounsaturated fats are mostly found in vegetable oils, such as olive, canola, and peanut oils. They are also found in avocados and some nuts. Monounsaturated fats are healthy for your heart. Thats because they lower LDL (unhealthy) cholesterol.  Polyunsaturated fats are mostly found in vegetable oils, such as corn, safflower, and soybean oils. They are also found in some seeds, nuts, and fish. Polyunsaturated fats lower LDL (unhealthy) cholesterol. So, choosing them instead of saturated fats is healthy for your heart. Certain unsaturated fats can help lower triglycerides.   Less Healthy:  Saturated fats are found in animal products, such as meat, poultry, whole milk, lard, and butter. Saturated fats raise LDL cholesterol and are not healthy for your heart.  Hydrogenated oils and trans fats are formed when vegetable oils are processed into solid fats. They are found in many processed foods. Hydrogenated oils and trans fats raise LDL cholesterol and lower HDL (healthy) cholesterol. They are not healthy for your heart.  Protein  Protein helps the body build and repair muscle and other tissue. Protein has little or no effect on blood sugar. However, many foods that contain protein also contain saturated fat. By choosing low-fat protein sources, you can get the benefits of protein without the extra fat:  Plant protein  is found in dry beans and peas, nuts, and soy products, such as tofu and soymilk. These sources tend to be cholesterol-free and low in saturated fat.  Animal protein is found in fish, poultry, meat, cheese, milk, and eggs. These contain cholesterol and can be high in saturated fat. Aim for lean, lower-fat choices.  Date Last Reviewed: 3/1/2016  © 5728-4931 [employer]. [address] [place] [address], [place] [address]. All rights reserved. This information is not intended as a substitute for professional medical care. Always follow your healthcare professional's instructions.           Guidelines for success  Talk with your healthcare provider before starting a diabetes diet or weight loss program. If you haven't talked with a dietitian yet, ask your provider for a referral. The following guidelines can help you succeed:  · Select foods from the 6 food groups below. Your dietitian will help you find food choices within each group. He or she will also show you serving sizes and how many servings you can have at each meal.  ¨ Grains, beans, and starchy vegetables  ¨ Vegetables  ¨ Fruit  ¨ Milk or yogurt  ¨ Meat, poultry, fish, or tofu  ¨ Healthy fats  · Check your blood sugar levels as directed by your provider. Take any medicine as prescribed by your provider.  · Learn to read food labels and pick the right portion sizes.  · Eat only the amount of food in your meal plan. Eat about the same amount of food at regular times each day. Dont skip meals. Eat meals 4 to 5 hours apart, with snacks in between.  · Limit alcohol. It raises blood sugar levels. Drink water or calorie-free diet drinks that use safe sweeteners.  · Eat less fat to help lower your risk of heart disease. Use nonfat or low-fat dairy products and lean meats. Avoid fried foods. Use cooking oils that are unsaturated, such as olive, canola, or peanut oil.  · Talk with your dietitian about safe sugar substitutes.  · Avoid added salt. It can contribute  to high blood pressure, which can cause heart disease. People with diabetes already have a risk of high blood pressure and heart disease.  · Stay at a healthy weight. If you need to lose weight, cut down on your portion sizes. But dont skip meals. Exercise is an important part of any weight management program. Talk with your provider about an exercise program thats right for you.  · For more information about the best diet plan for you, talk with a registered dietitian (RD). To find an RD in your area, contact:  ¨ Academy of Nutrition and Dietetics www.eatright.org  ¨ The American Diabetes Association 022-539-5055 www.diabetes.org  Date Last Reviewed: 8/1/2016 © 2000-2017 [employer]. [address] [place] [address], [place] [address]. All rights reserved. This information is not intended as a substitute for professional medical care. Always follow your healthcare professional's instructions.

## 2018-08-06 NOTE — PROGRESS NOTES
"Saumya Braun presented for a  Medicare AWV and comprehensive Health Risk Assessment today. The following components were reviewed and updated:    · Medical history  · Family History  · Social history  · Allergies and Current Medications  · Health Risk Assessment  · Health Maintenance  · Care Team     ** See Completed Assessments for Annual Wellness Visit within the encounter summary.**       The following assessments were completed:  · Living Situation  · CAGE  · Depression Screening  · Timed Get Up and Go  · Whisper Test  · Cognitive Function Screening  · Nutrition Screening  · ADL Screening  · PAQ Screening    Vitals:    08/06/18 1323   BP: 130/70   Pulse: 60   Temp: 97.7 °F (36.5 °C)   TempSrc: Oral   Weight: 60.7 kg (133 lb 12.8 oz)   Height: 5' 1" (1.549 m)     Body mass index is 25.28 kg/m².  Physical Exam   Constitutional: She is oriented to person, place, and time. She appears well-developed and well-nourished. No distress.   HENT:   Head: Normocephalic and atraumatic.   Eyes: EOM are normal. Pupils are equal, round, and reactive to light.   Neck: Normal range of motion. Neck supple.   Cardiovascular: Normal rate and regular rhythm.    Pulmonary/Chest: Effort normal and breath sounds normal.   Musculoskeletal: Normal range of motion.   Neurological: She is alert and oriented to person, place, and time.   Skin: Skin is warm and dry. No rash noted.   Psychiatric: She has a normal mood and affect. Judgment normal.   Nursing note and vitals reviewed.        Diagnoses and health risks identified today and associated recommendations/orders:    1. Uncontrolled type 2 diabetes mellitus without complication, without long-term current use of insulin  Stable and controlled on metformin  Continue medication as prescribed  Continue current treatment plan as previously prescribed with your PCP      2. Type 2 diabetes mellitus without complication, without long-term current use of insulin  Stable and controlled on " metformin  Continue medication as prescribed  Continue current treatment plan as previously prescribed with your PCP      3. Hyperlipidemia, unspecified hyperlipidemia type  Stable and controlled on simvastatin  Continue medication as prescribed  Continue current treatment plan as previously prescribed with your PCP      4. Hypertension associated with diabetes  Stable and controlled on bisoprolol- HCTZ  Continue medication as prescribed  Continue current treatment plan as previously prescribed with your PCP      5. Nonexudative age-related macular degeneration, bilateral, intermediate dry stage  Stable- routine exams, follow up for worsening symptoms    6. Retinitis pigmentosa, both eyes  Stable- routine eye exams    7. NS (nuclear sclerosis), bilateral  Stable- routine eye exams    8. Immunization due    - (In Office Administered) Pneumococcal Polysaccharide Vaccine (23 Valent) (SQ/IM)      Provided Saumya with a 5-10 year written screening schedule and personal prevention plan. Recommendations were developed using the USPSTF age appropriate recommendations. Education, counseling, and referrals were provided as needed. After Visit Summary printed and given to patient which includes a list of additional screenings\tests needed.    Follow-up for Routine scheduled appointment.    Shiva Medrano NP

## 2018-12-07 ENCOUNTER — PATIENT OUTREACH (OUTPATIENT)
Dept: ADMINISTRATIVE | Facility: HOSPITAL | Age: 73
End: 2018-12-07

## 2018-12-07 NOTE — PROGRESS NOTES
Called to schedule on 01/17/2019 at 11:00 am with Dr. Ortiz. Labs schedule on 01/10/2019 at 09:20 am. Patient in agreement and vocalize understanding. I will send appointment reminder in mail today.

## 2018-12-21 DIAGNOSIS — I15.2 HYPERTENSION ASSOCIATED WITH DIABETES: ICD-10-CM

## 2018-12-21 DIAGNOSIS — E11.59 HYPERTENSION ASSOCIATED WITH DIABETES: ICD-10-CM

## 2019-01-10 ENCOUNTER — LAB VISIT (OUTPATIENT)
Dept: LAB | Facility: HOSPITAL | Age: 74
End: 2019-01-10
Attending: FAMILY MEDICINE
Payer: MEDICARE

## 2019-01-10 DIAGNOSIS — E11.9 TYPE 2 DIABETES MELLITUS WITHOUT COMPLICATION: ICD-10-CM

## 2019-01-10 LAB
ALBUMIN SERPL BCP-MCNC: 3.8 G/DL
ALP SERPL-CCNC: 56 U/L
ALT SERPL W/O P-5'-P-CCNC: 34 U/L
ANION GAP SERPL CALC-SCNC: 12 MMOL/L
AST SERPL-CCNC: 28 U/L
BILIRUB SERPL-MCNC: 0.7 MG/DL
BUN SERPL-MCNC: 13 MG/DL
CALCIUM SERPL-MCNC: 9.7 MG/DL
CHLORIDE SERPL-SCNC: 99 MMOL/L
CHOLEST SERPL-MCNC: 208 MG/DL
CHOLEST/HDLC SERPL: 5 {RATIO}
CO2 SERPL-SCNC: 28 MMOL/L
CREAT SERPL-MCNC: 0.8 MG/DL
EST. GFR  (AFRICAN AMERICAN): >60 ML/MIN/1.73 M^2
EST. GFR  (NON AFRICAN AMERICAN): >60 ML/MIN/1.73 M^2
ESTIMATED AVG GLUCOSE: 200 MG/DL
GLUCOSE SERPL-MCNC: 229 MG/DL
HBA1C MFR BLD HPLC: 8.6 %
HDLC SERPL-MCNC: 42 MG/DL
HDLC SERPL: 20.2 %
LDLC SERPL CALC-MCNC: 117.2 MG/DL
NONHDLC SERPL-MCNC: 166 MG/DL
POTASSIUM SERPL-SCNC: 4 MMOL/L
PROT SERPL-MCNC: 7.3 G/DL
SODIUM SERPL-SCNC: 139 MMOL/L
TRIGL SERPL-MCNC: 244 MG/DL

## 2019-01-10 PROCEDURE — 80053 COMPREHEN METABOLIC PANEL: CPT

## 2019-01-10 PROCEDURE — 83036 HEMOGLOBIN GLYCOSYLATED A1C: CPT

## 2019-01-10 PROCEDURE — 80061 LIPID PANEL: CPT

## 2019-01-10 PROCEDURE — 36415 COLL VENOUS BLD VENIPUNCTURE: CPT | Mod: PO

## 2019-01-17 ENCOUNTER — OFFICE VISIT (OUTPATIENT)
Dept: FAMILY MEDICINE | Facility: CLINIC | Age: 74
End: 2019-01-17
Payer: MEDICARE

## 2019-01-17 VITALS
HEIGHT: 61 IN | DIASTOLIC BLOOD PRESSURE: 60 MMHG | HEART RATE: 63 BPM | WEIGHT: 136 LBS | BODY MASS INDEX: 25.68 KG/M2 | SYSTOLIC BLOOD PRESSURE: 139 MMHG | TEMPERATURE: 99 F

## 2019-01-17 DIAGNOSIS — E11.59 HYPERTENSION ASSOCIATED WITH DIABETES: ICD-10-CM

## 2019-01-17 DIAGNOSIS — I15.2 HYPERTENSION ASSOCIATED WITH DIABETES: ICD-10-CM

## 2019-01-17 DIAGNOSIS — E78.5 HYPERLIPIDEMIA, UNSPECIFIED HYPERLIPIDEMIA TYPE: ICD-10-CM

## 2019-01-17 DIAGNOSIS — N39.0 UTI (URINARY TRACT INFECTION), UNCOMPLICATED: ICD-10-CM

## 2019-01-17 DIAGNOSIS — E11.9 TYPE 2 DIABETES MELLITUS WITHOUT COMPLICATION, WITHOUT LONG-TERM CURRENT USE OF INSULIN: ICD-10-CM

## 2019-01-17 DIAGNOSIS — R39.89 POSSIBLE URINARY TRACT INFECTION: Primary | ICD-10-CM

## 2019-01-17 PROBLEM — I10 BENIGN ESSENTIAL HTN: Status: RESOLVED | Noted: 2018-07-12 | Resolved: 2019-01-17

## 2019-01-17 LAB
BACTERIA #/AREA URNS HPF: ABNORMAL /HPF
BILIRUB UR QL STRIP: NEGATIVE
CLARITY UR: ABNORMAL
COLOR UR: YELLOW
GLUCOSE UR QL STRIP: ABNORMAL
HGB UR QL STRIP: ABNORMAL
KETONES UR QL STRIP: NEGATIVE
LEUKOCYTE ESTERASE UR QL STRIP: ABNORMAL
MICROSCOPIC COMMENT: ABNORMAL
NITRITE UR QL STRIP: POSITIVE
PH UR STRIP: 6 [PH] (ref 5–8)
PROT UR QL STRIP: NEGATIVE
RBC #/AREA URNS HPF: 2 /HPF (ref 0–4)
SP GR UR STRIP: 1.01 (ref 1–1.03)
SQUAMOUS #/AREA URNS HPF: 4 /HPF
URN SPEC COLLECT METH UR: ABNORMAL
WBC #/AREA URNS HPF: >100 /HPF (ref 0–5)
YEAST URNS QL MICRO: ABNORMAL

## 2019-01-17 PROCEDURE — 99999 PR PBB SHADOW E&M-EST. PATIENT-LVL III: ICD-10-PCS | Mod: PBBFAC,,, | Performed by: FAMILY MEDICINE

## 2019-01-17 PROCEDURE — 3075F SYST BP GE 130 - 139MM HG: CPT | Mod: CPTII,S$GLB,, | Performed by: FAMILY MEDICINE

## 2019-01-17 PROCEDURE — 99999 PR PBB SHADOW E&M-EST. PATIENT-LVL III: CPT | Mod: PBBFAC,,, | Performed by: FAMILY MEDICINE

## 2019-01-17 PROCEDURE — 99214 PR OFFICE/OUTPT VISIT, EST, LEVL IV, 30-39 MIN: ICD-10-PCS | Mod: S$GLB,,, | Performed by: FAMILY MEDICINE

## 2019-01-17 PROCEDURE — 1101F PR PT FALLS ASSESS DOC 0-1 FALLS W/OUT INJ PAST YR: ICD-10-PCS | Mod: CPTII,S$GLB,, | Performed by: FAMILY MEDICINE

## 2019-01-17 PROCEDURE — 3075F PR MOST RECENT SYSTOLIC BLOOD PRESS GE 130-139MM HG: ICD-10-PCS | Mod: CPTII,S$GLB,, | Performed by: FAMILY MEDICINE

## 2019-01-17 PROCEDURE — 1101F PT FALLS ASSESS-DOCD LE1/YR: CPT | Mod: CPTII,S$GLB,, | Performed by: FAMILY MEDICINE

## 2019-01-17 PROCEDURE — 3045F PR MOST RECENT HEMOGLOBIN A1C LEVEL 7.0-9.0%: ICD-10-PCS | Mod: CPTII,S$GLB,, | Performed by: FAMILY MEDICINE

## 2019-01-17 PROCEDURE — 99499 RISK ADDL DX/OHS AUDIT: ICD-10-PCS | Mod: HCNC,S$GLB,, | Performed by: FAMILY MEDICINE

## 2019-01-17 PROCEDURE — 3045F PR MOST RECENT HEMOGLOBIN A1C LEVEL 7.0-9.0%: CPT | Mod: CPTII,S$GLB,, | Performed by: FAMILY MEDICINE

## 2019-01-17 PROCEDURE — 81000 URINALYSIS NONAUTO W/SCOPE: CPT | Mod: PO

## 2019-01-17 PROCEDURE — 99499 UNLISTED E&M SERVICE: CPT | Mod: HCNC,S$GLB,, | Performed by: FAMILY MEDICINE

## 2019-01-17 PROCEDURE — 3078F PR MOST RECENT DIASTOLIC BLOOD PRESSURE < 80 MM HG: ICD-10-PCS | Mod: CPTII,S$GLB,, | Performed by: FAMILY MEDICINE

## 2019-01-17 PROCEDURE — 3078F DIAST BP <80 MM HG: CPT | Mod: CPTII,S$GLB,, | Performed by: FAMILY MEDICINE

## 2019-01-17 PROCEDURE — 99214 OFFICE O/P EST MOD 30 MIN: CPT | Mod: S$GLB,,, | Performed by: FAMILY MEDICINE

## 2019-01-17 RX ORDER — CEPHALEXIN 500 MG/1
500 CAPSULE ORAL EVERY 12 HOURS
Qty: 10 CAPSULE | Refills: 0 | Status: SHIPPED | OUTPATIENT
Start: 2019-01-17 | End: 2019-04-25

## 2019-01-17 RX ORDER — GLIPIZIDE 5 MG/1
5 TABLET, FILM COATED, EXTENDED RELEASE ORAL
Qty: 30 TABLET | Refills: 11 | Status: SHIPPED | OUTPATIENT
Start: 2019-01-17 | End: 2019-07-30

## 2019-01-17 RX ORDER — ATORVASTATIN CALCIUM 10 MG/1
10 TABLET, FILM COATED ORAL DAILY
Qty: 30 TABLET | Refills: 11 | Status: SHIPPED | OUTPATIENT
Start: 2019-01-17 | End: 2019-03-14 | Stop reason: SDUPTHER

## 2019-01-17 NOTE — PROGRESS NOTES
The patient presents today to fu DM  not controlled a1c 8.6-has not been controlled for years  Couldn't be metformin and actos and januvia wasn't effective       Past Medical History:  Past Medical History:   Diagnosis Date    Cancer     Cataract     OU    Hyperlipidemia 7/12/2018    Hypertension     PONV (postoperative nausea and vomiting)     SCC (squamous cell carcinoma) EXCISED  VIA MOHS    MID UPPER FOREHEAD    Squamous Cell Carcinoma     right scalp and mid nasal bridge    Type 2 diabetes mellitus      Past Surgical History:   Procedure Laterality Date    HYSTERECTOMY      SKIN BIOPSY       Review of patient's allergies indicates:  No Known Allergies  Current Outpatient Medications on File Prior to Visit   Medication Sig Dispense Refill    albuterol 90 mcg/actuation inhaler Inhale 2 puffs into the lungs every 6 (six) hours as needed for Wheezing. Rescue 18 g 6    bisoprolol-hydrochlorothiazide (ZIAC) 10-6.25 mg per tablet TAKE ONE TABLET BY MOUTH EVERY DAY 90 tablet 3    blood sugar diagnostic Strp TEST BLOOD SUGAR ONCE DAILY 300 each 3    estradiol (VIVELLE-DOT) 0.1 mg/24 hr PTSW APPLY 1 PATCH ONTO SKIN TWO TIMES A WEEK 8 patch 11    fluticasone (FLONASE) 50 mcg/actuation nasal spray 2 sprays (100 mcg total) by Each Nare route once daily. 16 g 6    FLUZONE HIGH-DOSE 2017-18, PF, 180 mcg/0.5 mL vaccine ADM 0.5ML IM UTD  0    metFORMIN (GLUCOPHAGE-XR) 500 MG 24 hr tablet Take 1 tablet (500 mg total) by mouth every evening. 90 tablet 3    simvastatin (ZOCOR) 5 MG tablet Take 1 tablet (5 mg total) by mouth every evening. 90 tablet 3     No current facility-administered medications on file prior to visit.      Social History     Socioeconomic History    Marital status:      Spouse name: Not on file    Number of children: Not on file    Years of education: Not on file    Highest education level: Not on file   Social Needs    Financial resource strain: Not on file    Food  insecurity - worry: Not on file    Food insecurity - inability: Not on file    Transportation needs - medical: Not on file    Transportation needs - non-medical: Not on file   Occupational History    Not on file   Tobacco Use    Smoking status: Never Smoker    Smokeless tobacco: Never Used   Substance and Sexual Activity    Alcohol use: No    Drug use: No    Sexual activity: Not on file   Other Topics Concern    Are you pregnant or think you may be? Not Asked    Breast-feeding Not Asked   Social History Narrative    Not on file     Family History   Problem Relation Age of Onset    Skin cancer Neg Hx     Melanoma Neg Hx          ROS:GENERAL: No fever, chills, fatigability or weight loss.  SKIN: No rashes, itching or changes in color or texture of skin.  HEAD: No headaches or recent head trauma.EYES: Visual acuity fine. No photophobia, ocular pain or diplopia.EARS: Denies ear pain, discharge or vertigo.NOSE: No loss of smell, no epistaxis or postnasal drip.MOUTH & THROAT: No hoarseness or change in voice. No excessive gum bleeding.NODES: Denies swollen glands.  CHEST: Denies CASTORENA, cyanosis, wheezing, cough and sputum production.  CARDIOVASCULAR: Denies chest pain, PND, orthopnea or reduced exercise tolerance.  ABDOMEN: Appetite fine. No weight loss. Denies diarrhea, abdominal pain, hematemesis or blood in stool.  URINARY: No flank pain, dysuria or hematuria.  PERIPHERAL VASCULAR: No claudication or cyanosis.  MUSCULOSKELETAL: See above.  NEUROLOGIC: No history of seizures, paralysis, alteration of gait or coordination.  PE:   HEAD: Normocephalic, atraumatic.EYES: PERRL. EOMI.   EARS: TM's intact. Light reflex normal. No retraction or perforation.   NOSE: Mucosa pink. Airway clear.MOUTH & THROAT: No tonsillar enlargement. No pharyngeal erythema or exudate. No stridor.  NODES: No cervical, axillary or inguinal lymph node enlargement.  CHEST: Lungs clear to auscultation.  CARDIOVASCULAR: Normal S1, S2. No  rubs, murmurs or gallops.  ABDOMEN: Bowel sounds normal. Not distended. Soft. No tenderness or masses.  MUSCULOSKELETAL: No palpable abnormality  NEUROLOGIC: Cranial Nerves: II-XII grossly intact.  Motor: 5/5 strength major flexors/extensors.  DTR's: Knees, Ankles 2+ and equal bilaterally; downgoing toes.  Sensory: Intact to light touch distally.  Gait & Posture: Normal gait and fine motion. No cerebellar signs.     Impression: UTI  Uncontrolled T2 DM  HLP  HBP  TMJ  Plan:Lab eval rev   UA   Rev tx optiond defers injectables for now-   Rec diet and ex recs  Trial glipizide 24  5mg q am w precaution  After above try atorvastatin 10 .5 tab -prev intil mevacor   FU 3 m w a1c

## 2019-02-01 RX ORDER — FLUTICASONE PROPIONATE 50 MCG
SPRAY, SUSPENSION (ML) NASAL
Qty: 48 ML | Refills: 4 | Status: SHIPPED | OUTPATIENT
Start: 2019-02-01 | End: 2019-04-25

## 2019-02-12 ENCOUNTER — TELEPHONE (OUTPATIENT)
Dept: FAMILY MEDICINE | Facility: CLINIC | Age: 74
End: 2019-02-12

## 2019-02-12 NOTE — TELEPHONE ENCOUNTER
----- Message from Semaj Rodriguez sent at 2/12/2019 10:41 AM CST -----  Contact: [phone]   Pt would like to know if  shingle shots are available.  Please call back at  [phone]. x-

## 2019-03-14 RX ORDER — ATORVASTATIN CALCIUM 10 MG/1
10 TABLET, FILM COATED ORAL DAILY
Qty: 30 TABLET | Refills: 11 | Status: SHIPPED | OUTPATIENT
Start: 2019-03-14 | End: 2019-03-18 | Stop reason: SDUPTHER

## 2019-03-14 NOTE — TELEPHONE ENCOUNTER
----- Message from Harika Foreman sent at 3/14/2019 10:41 AM CDT -----  Contact: [facility] [phone]  Type:  RX Refill Request    Who Called: [facility]  Refill or New Rx:refill  RX Name and Strength:lipitor 10mg  How is the patient currently taking it? (ex. 1XDay):1x day  Is this a 30 day or 90 day RX:90 day  Preferred Pharmacy with phone number:    [facility] [place] - [id number]  [address] [facility] [address]  Phone: [phone] Fax: [phone]    Local or Mail Order:local  Ordering Provider:Pearl  Would the patient rather a call back or a response via [facility]? Call back   Best Call Back Number:[phone]  Additional Information:

## 2019-03-18 RX ORDER — ATORVASTATIN CALCIUM 10 MG/1
10 TABLET, FILM COATED ORAL DAILY
Qty: 90 TABLET | Refills: 4 | Status: SHIPPED | OUTPATIENT
Start: 2019-03-18 | End: 2019-04-25

## 2019-03-28 ENCOUNTER — PES CALL (OUTPATIENT)
Dept: ADMINISTRATIVE | Facility: CLINIC | Age: 74
End: 2019-03-28

## 2019-04-01 ENCOUNTER — OFFICE VISIT (OUTPATIENT)
Dept: OPHTHALMOLOGY | Facility: CLINIC | Age: 74
End: 2019-04-01
Payer: MEDICARE

## 2019-04-01 DIAGNOSIS — H35.52 RETINITIS PIGMENTOSA, BOTH EYES: ICD-10-CM

## 2019-04-01 DIAGNOSIS — H35.3132 NONEXUDATIVE AGE-RELATED MACULAR DEGENERATION, BILATERAL, INTERMEDIATE DRY STAGE: Primary | ICD-10-CM

## 2019-04-01 DIAGNOSIS — H25.13 NS (NUCLEAR SCLEROSIS), BILATERAL: ICD-10-CM

## 2019-04-01 PROCEDURE — 92014 PR EYE EXAM, EST PATIENT,COMPREHESV: ICD-10-PCS | Mod: HCNC,S$GLB,, | Performed by: OPHTHALMOLOGY

## 2019-04-01 PROCEDURE — 92226 PR SPECIAL EYE EXAM, SUBSEQUENT: ICD-10-PCS | Mod: 50,HCNC,S$GLB, | Performed by: OPHTHALMOLOGY

## 2019-04-01 PROCEDURE — 92014 COMPRE OPH EXAM EST PT 1/>: CPT | Mod: HCNC,S$GLB,, | Performed by: OPHTHALMOLOGY

## 2019-04-01 PROCEDURE — 99999 PR PBB SHADOW E&M-EST. PATIENT-LVL III: ICD-10-PCS | Mod: PBBFAC,HCNC,, | Performed by: OPHTHALMOLOGY

## 2019-04-01 PROCEDURE — 92226 PR SPECIAL EYE EXAM, SUBSEQUENT: CPT | Mod: 50,HCNC,S$GLB, | Performed by: OPHTHALMOLOGY

## 2019-04-01 PROCEDURE — 92134 POSTERIOR SEGMENT OCT RETINA (OCULAR COHERENCE TOMOGRAPHY)-BOTH EYES: ICD-10-PCS | Mod: HCNC,S$GLB,, | Performed by: OPHTHALMOLOGY

## 2019-04-01 PROCEDURE — 99999 PR PBB SHADOW E&M-EST. PATIENT-LVL III: CPT | Mod: PBBFAC,HCNC,, | Performed by: OPHTHALMOLOGY

## 2019-04-01 PROCEDURE — 92134 CPTRZ OPH DX IMG PST SGM RTA: CPT | Mod: HCNC,S$GLB,, | Performed by: OPHTHALMOLOGY

## 2019-04-01 NOTE — PROGRESS NOTES
HPI     Yearly f/u   DLS- 03/19/2018 Dr. Jacbos     Pt sts vision stable no change denies pain   (-)Flashes (-)Floaters  (+)Photophobia normally   (-)Glare    AT's PRN     HPI     Eye Problem    Additional comments: AMD           Comments   Patient here for evaluation AMD per Jess Colón OD. She hasn't noticed any   changes in her vision. She states she is not diabetic just insulin   resistant. She controls BS by watching what she eats.     LBS=160 this am  A1C=8.2 (1/5/18)       Last edited by Aylin Ko on 3/19/2018  9:29 AM. 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 Dry AMD component  Continue AREDS/AG    3. NS OU  Monitor      1 year OCT

## 2019-04-11 ENCOUNTER — PATIENT OUTREACH (OUTPATIENT)
Dept: ADMINISTRATIVE | Facility: HOSPITAL | Age: 74
End: 2019-04-11

## 2019-04-11 DIAGNOSIS — I15.2 HYPERTENSION ASSOCIATED WITH DIABETES: Primary | ICD-10-CM

## 2019-04-11 DIAGNOSIS — E11.59 HYPERTENSION ASSOCIATED WITH DIABETES: Primary | ICD-10-CM

## 2019-04-18 ENCOUNTER — LAB VISIT (OUTPATIENT)
Dept: LAB | Facility: HOSPITAL | Age: 74
End: 2019-04-18
Attending: FAMILY MEDICINE
Payer: MEDICARE

## 2019-04-18 DIAGNOSIS — E11.9 TYPE 2 DIABETES MELLITUS WITHOUT COMPLICATION: ICD-10-CM

## 2019-04-18 LAB
CHOLEST SERPL-MCNC: 200 MG/DL (ref 120–199)
CHOLEST/HDLC SERPL: 4.9 {RATIO} (ref 2–5)
ESTIMATED AVG GLUCOSE: 163 MG/DL (ref 68–131)
HBA1C MFR BLD HPLC: 7.3 % (ref 4–5.6)
HDLC SERPL-MCNC: 41 MG/DL (ref 40–75)
HDLC SERPL: 20.5 % (ref 20–50)
LDLC SERPL CALC-MCNC: 119.8 MG/DL (ref 63–159)
NONHDLC SERPL-MCNC: 159 MG/DL
TRIGL SERPL-MCNC: 196 MG/DL (ref 30–150)

## 2019-04-18 PROCEDURE — 80061 LIPID PANEL: CPT | Mod: HCNC

## 2019-04-18 PROCEDURE — 83036 HEMOGLOBIN GLYCOSYLATED A1C: CPT | Mod: HCNC

## 2019-04-18 PROCEDURE — 36415 COLL VENOUS BLD VENIPUNCTURE: CPT | Mod: HCNC,PO

## 2019-04-25 ENCOUNTER — OFFICE VISIT (OUTPATIENT)
Dept: FAMILY MEDICINE | Facility: CLINIC | Age: 74
End: 2019-04-25
Payer: MEDICARE

## 2019-04-25 VITALS
WEIGHT: 132.38 LBS | HEIGHT: 61 IN | BODY MASS INDEX: 24.99 KG/M2 | HEART RATE: 60 BPM | DIASTOLIC BLOOD PRESSURE: 60 MMHG | SYSTOLIC BLOOD PRESSURE: 117 MMHG | TEMPERATURE: 98 F

## 2019-04-25 DIAGNOSIS — I15.2 HYPERTENSION ASSOCIATED WITH DIABETES: ICD-10-CM

## 2019-04-25 DIAGNOSIS — E11.9 TYPE 2 DIABETES MELLITUS WITHOUT COMPLICATION, WITHOUT LONG-TERM CURRENT USE OF INSULIN: Primary | ICD-10-CM

## 2019-04-25 DIAGNOSIS — E11.59 HYPERTENSION ASSOCIATED WITH DIABETES: ICD-10-CM

## 2019-04-25 DIAGNOSIS — E78.5 HYPERLIPIDEMIA, UNSPECIFIED HYPERLIPIDEMIA TYPE: ICD-10-CM

## 2019-04-25 PROCEDURE — 3078F DIAST BP <80 MM HG: CPT | Mod: HCNC,CPTII,S$GLB, | Performed by: FAMILY MEDICINE

## 2019-04-25 PROCEDURE — 1101F PT FALLS ASSESS-DOCD LE1/YR: CPT | Mod: HCNC,CPTII,S$GLB, | Performed by: FAMILY MEDICINE

## 2019-04-25 PROCEDURE — 99214 OFFICE O/P EST MOD 30 MIN: CPT | Mod: HCNC,S$GLB,, | Performed by: FAMILY MEDICINE

## 2019-04-25 PROCEDURE — 3074F PR MOST RECENT SYSTOLIC BLOOD PRESSURE < 130 MM HG: ICD-10-PCS | Mod: HCNC,CPTII,S$GLB, | Performed by: FAMILY MEDICINE

## 2019-04-25 PROCEDURE — 99999 PR PBB SHADOW E&M-EST. PATIENT-LVL III: ICD-10-PCS | Mod: PBBFAC,HCNC,, | Performed by: FAMILY MEDICINE

## 2019-04-25 PROCEDURE — 3074F SYST BP LT 130 MM HG: CPT | Mod: HCNC,CPTII,S$GLB, | Performed by: FAMILY MEDICINE

## 2019-04-25 PROCEDURE — 99999 PR PBB SHADOW E&M-EST. PATIENT-LVL III: CPT | Mod: PBBFAC,HCNC,, | Performed by: FAMILY MEDICINE

## 2019-04-25 PROCEDURE — 99499 UNLISTED E&M SERVICE: CPT | Mod: S$GLB,,, | Performed by: FAMILY MEDICINE

## 2019-04-25 PROCEDURE — 99499 RISK ADDL DX/OHS AUDIT: ICD-10-PCS | Mod: S$GLB,,, | Performed by: FAMILY MEDICINE

## 2019-04-25 PROCEDURE — 3078F PR MOST RECENT DIASTOLIC BLOOD PRESSURE < 80 MM HG: ICD-10-PCS | Mod: HCNC,CPTII,S$GLB, | Performed by: FAMILY MEDICINE

## 2019-04-25 PROCEDURE — 1101F PR PT FALLS ASSESS DOC 0-1 FALLS W/OUT INJ PAST YR: ICD-10-PCS | Mod: HCNC,CPTII,S$GLB, | Performed by: FAMILY MEDICINE

## 2019-04-25 PROCEDURE — 99214 PR OFFICE/OUTPT VISIT, EST, LEVL IV, 30-39 MIN: ICD-10-PCS | Mod: HCNC,S$GLB,, | Performed by: FAMILY MEDICINE

## 2019-04-25 RX ORDER — GLIPIZIDE 2.5 MG/1
2.5 TABLET, EXTENDED RELEASE ORAL
Qty: 30 TABLET | Refills: 3 | Status: SHIPPED | OUTPATIENT
Start: 2019-04-25 | End: 2019-07-30 | Stop reason: SDUPTHER

## 2019-04-25 NOTE — PROGRESS NOTES
Prescription called to [facility] as they are still having computer issues and escribe wouldn't go throiugh

## 2019-04-25 NOTE — PROGRESS NOTES
The patient presents today to fu DM  Prev not controlled a1c 8.6-has not been controlled for years  Couldn't be metformin and actos and januvia wasn't effective. After 2 m on glipizide stomach cramps       Past Medical History:  Past Medical History:   Diagnosis Date    Cancer     Cataract     OU    Hyperlipidemia 7/12/2018    Hypertension     PONV (postoperative nausea and vomiting)     SCC (squamous cell carcinoma) EXCISED  VIA MOHS    MID UPPER FOREHEAD    Squamous Cell Carcinoma     right scalp and mid nasal bridge    Type 2 diabetes mellitus      Past Surgical History:   Procedure Laterality Date    HYSTERECTOMY      SKIN BIOPSY       Review of patient's allergies indicates:  No Known Allergies  Current Outpatient Medications on File Prior to Visit   Medication Sig Dispense Refill    albuterol 90 mcg/actuation inhaler Inhale 2 puffs into the lungs every 6 (six) hours as needed for Wheezing. Rescue 18 g 6    atorvastatin (LIPITOR) 10 MG tablet Take 1 tablet (10 mg total) by mouth once daily. 90 tablet 4    bisoprolol-hydrochlorothiazide (ZIAC) 10-6.25 mg per tablet TAKE ONE TABLET BY MOUTH EVERY DAY 90 tablet 3    blood sugar diagnostic Strp TEST BLOOD SUGAR ONCE DAILY 300 each 3    cephALEXin (KEFLEX) 500 MG capsule Take 1 capsule (500 mg total) by mouth every 12 (twelve) hours. 10 capsule 0    estradiol (VIVELLE-DOT) 0.1 mg/24 hr PTSW APPLY 1 PATCH ONTO SKIN TWO TIMES A WEEK 8 patch 11    fluticasone (FLONASE) 50 mcg/actuation nasal spray 2 sprays (100 mcg total) by Each Nare route once daily. 16 g 6    fluticasone (FLONASE) 50 mcg/actuation nasal spray SPRAY TWICE IN EACH NOSTRIL EVERY DAY 48 mL 4    FLUZONE HIGH-DOSE 2017-18, PF, 180 mcg/0.5 mL vaccine ADM 0.5ML IM UTD  0    glipiZIDE (GLUCOTROL) 5 MG TR24 Take 1 tablet (5 mg total) by mouth daily with breakfast. 30 tablet 11    metFORMIN (GLUCOPHAGE-XR) 500 MG 24 hr tablet Take 1 tablet (500 mg total) by mouth every evening. 90  tablet 3    simvastatin (ZOCOR) 5 MG tablet Take 1 tablet (5 mg total) by mouth every evening. 90 tablet 3     No current facility-administered medications on file prior to visit.      Social History     Socioeconomic History    Marital status:      Spouse name: Not on file    Number of children: Not on file    Years of education: Not on file    Highest education level: Not on file   Occupational History    Not on file   Social Needs    Financial resource strain: Not on file    Food insecurity:     Worry: Not on file     Inability: Not on file    Transportation needs:     Medical: Not on file     Non-medical: Not on file   Tobacco Use    Smoking status: Never Smoker    Smokeless tobacco: Never Used   Substance and Sexual Activity    Alcohol use: No    Drug use: No    Sexual activity: Not on file   Lifestyle    Physical activity:     Days per week: Not on file     Minutes per session: Not on file    Stress: Not on file   Relationships    Social connections:     Talks on phone: Not on file     Gets together: Not on file     Attends Sikhism service: Not on file     Active member of club or organization: Not on file     Attends meetings of clubs or organizations: Not on file     Relationship status: Not on file   Other Topics Concern    Are you pregnant or think you may be? Not Asked    Breast-feeding Not Asked   Social History Narrative    Not on file     Family History   Problem Relation Age of Onset    Skin cancer Neg Hx     Melanoma Neg Hx          ROS:GENERAL: No fever, chills, fatigability or weight loss.  SKIN: No rashes, itching or changes in color or texture of skin.  HEAD: No headaches or recent head trauma.EYES: Visual acuity fine. No photophobia, ocular pain or diplopia.EARS: Denies ear pain, discharge or vertigo.NOSE: No loss of smell, no epistaxis or postnasal drip.MOUTH & THROAT: No hoarseness or change in voice. No excessive gum bleeding.NODES: Denies swollen  glands.  CHEST: Denies CASTORENA, cyanosis, wheezing, cough and sputum production.  CARDIOVASCULAR: Denies chest pain, PND, orthopnea or reduced exercise tolerance.  ABDOMEN: Appetite fine. No weight loss. Denies diarrhea, abdominal pain, hematemesis or blood in stool.  URINARY: No flank pain, dysuria or hematuria.  PERIPHERAL VASCULAR: No claudication or cyanosis.  MUSCULOSKELETAL: See above.  NEUROLOGIC: No history of seizures, paralysis, alteration of gait or coordination.  PE:   HEAD: Normocephalic, atraumatic.EYES: PERRL. EOMI.   EARS: TM's intact. Light reflex normal. No retraction or perforation.   NOSE: Mucosa pink. Airway clear.MOUTH & THROAT: No tonsillar enlargement. No pharyngeal erythema or exudate. No stridor.  NODES: No cervical, axillary or inguinal lymph node enlargement.  CHEST: Lungs clear to auscultation.  CARDIOVASCULAR: Normal S1, S2. No rubs, murmurs or gallops.  ABDOMEN: Bowel sounds normal. Not distended. Soft. No tenderness or masses.  MUSCULOSKELETAL: No palpable abnormality  NEUROLOGIC: Cranial Nerves: II-XII grossly intact.  Motor: 5/5 strength major flexors/extensors.  DTR's: Knees, Ankles 2+ and equal bilaterally; downgoing toes.  Sensory: Intact to light touch distally.  Gait & Posture: Normal gait and fine motion. No cerebellar signs.     Impression: UTI  Uncontrolled T2 DM  HLP  HBP  TMJ  Plan:Lab eval rev a1c 7.3 from >8  LDL  MAB nl  Rec diet and ex recs  Try glipizide 24  2.5mg q am w precaution(abd cramps at 5mg)   Cont atorvastatin 10 .5 tab -prev intol mevacor   FU 3 m w a1c

## 2019-05-20 RX ORDER — ALBUTEROL SULFATE 90 UG/1
AEROSOL, METERED RESPIRATORY (INHALATION)
Qty: 18 G | Refills: 4 | Status: SHIPPED | OUTPATIENT
Start: 2019-05-20 | End: 2020-02-11 | Stop reason: SDUPTHER

## 2019-07-25 ENCOUNTER — LAB VISIT (OUTPATIENT)
Dept: LAB | Facility: HOSPITAL | Age: 74
End: 2019-07-25
Attending: FAMILY MEDICINE
Payer: MEDICARE

## 2019-07-25 DIAGNOSIS — E11.9 TYPE 2 DIABETES MELLITUS WITHOUT COMPLICATION: ICD-10-CM

## 2019-07-25 DIAGNOSIS — N39.0 UTI (URINARY TRACT INFECTION), UNCOMPLICATED: ICD-10-CM

## 2019-07-25 LAB
BACTERIA #/AREA URNS HPF: ABNORMAL /HPF
BILIRUB UR QL STRIP: NEGATIVE
CLARITY UR: ABNORMAL
COLOR UR: YELLOW
ESTIMATED AVG GLUCOSE: 163 MG/DL (ref 68–131)
GLUCOSE UR QL STRIP: NEGATIVE
HBA1C MFR BLD HPLC: 7.3 % (ref 4–5.6)
HGB UR QL STRIP: ABNORMAL
KETONES UR QL STRIP: NEGATIVE
LEUKOCYTE ESTERASE UR QL STRIP: ABNORMAL
MICROSCOPIC COMMENT: ABNORMAL
NITRITE UR QL STRIP: NEGATIVE
PH UR STRIP: 5.5 [PH] (ref 5–8)
PROT UR QL STRIP: NEGATIVE
RBC #/AREA URNS HPF: 22 /HPF (ref 0–4)
SP GR UR STRIP: 1.03 (ref 1–1.03)
SQUAMOUS #/AREA URNS HPF: 15 /HPF
URN SPEC COLLECT METH UR: ABNORMAL
WBC #/AREA URNS HPF: >100 /HPF (ref 0–5)

## 2019-07-25 PROCEDURE — 81000 URINALYSIS NONAUTO W/SCOPE: CPT | Mod: HCNC,PO

## 2019-07-25 PROCEDURE — 83036 HEMOGLOBIN GLYCOSYLATED A1C: CPT | Mod: HCNC

## 2019-07-25 PROCEDURE — 36415 COLL VENOUS BLD VENIPUNCTURE: CPT | Mod: HCNC,PO

## 2019-07-25 RX ORDER — CEPHALEXIN 500 MG/1
500 CAPSULE ORAL EVERY 12 HOURS
Qty: 14 CAPSULE | Refills: 1 | Status: SHIPPED | OUTPATIENT
Start: 2019-07-25 | End: 2020-02-11

## 2019-07-26 DIAGNOSIS — E11.9 TYPE 2 DIABETES MELLITUS WITHOUT COMPLICATION, WITHOUT LONG-TERM CURRENT USE OF INSULIN: Primary | ICD-10-CM

## 2019-07-30 ENCOUNTER — OFFICE VISIT (OUTPATIENT)
Dept: FAMILY MEDICINE | Facility: CLINIC | Age: 74
End: 2019-07-30
Payer: MEDICARE

## 2019-07-30 VITALS
SYSTOLIC BLOOD PRESSURE: 122 MMHG | DIASTOLIC BLOOD PRESSURE: 59 MMHG | TEMPERATURE: 98 F | WEIGHT: 134 LBS | BODY MASS INDEX: 25.3 KG/M2 | HEIGHT: 61 IN | HEART RATE: 62 BPM

## 2019-07-30 DIAGNOSIS — E11.9 TYPE 2 DIABETES MELLITUS WITHOUT COMPLICATION, WITHOUT LONG-TERM CURRENT USE OF INSULIN: ICD-10-CM

## 2019-07-30 DIAGNOSIS — H35.3132 NONEXUDATIVE AGE-RELATED MACULAR DEGENERATION, BILATERAL, INTERMEDIATE DRY STAGE: ICD-10-CM

## 2019-07-30 DIAGNOSIS — E78.5 HYPERLIPIDEMIA, UNSPECIFIED HYPERLIPIDEMIA TYPE: ICD-10-CM

## 2019-07-30 DIAGNOSIS — I15.2 HYPERTENSION ASSOCIATED WITH DIABETES: Primary | ICD-10-CM

## 2019-07-30 DIAGNOSIS — H25.13 NS (NUCLEAR SCLEROSIS), BILATERAL: ICD-10-CM

## 2019-07-30 DIAGNOSIS — E11.59 HYPERTENSION ASSOCIATED WITH DIABETES: Primary | ICD-10-CM

## 2019-07-30 DIAGNOSIS — H35.52 RETINITIS PIGMENTOSA, BOTH EYES: ICD-10-CM

## 2019-07-30 DIAGNOSIS — N95.1 POST MENOPAUSAL SYNDROME: ICD-10-CM

## 2019-07-30 PROCEDURE — 3078F PR MOST RECENT DIASTOLIC BLOOD PRESSURE < 80 MM HG: ICD-10-PCS | Mod: HCNC,CPTII,S$GLB, | Performed by: NURSE PRACTITIONER

## 2019-07-30 PROCEDURE — G0439 PR MEDICARE ANNUAL WELLNESS SUBSEQUENT VISIT: ICD-10-PCS | Mod: HCNC,S$GLB,, | Performed by: NURSE PRACTITIONER

## 2019-07-30 PROCEDURE — G0439 PPPS, SUBSEQ VISIT: HCPCS | Mod: HCNC,S$GLB,, | Performed by: NURSE PRACTITIONER

## 2019-07-30 PROCEDURE — 3078F DIAST BP <80 MM HG: CPT | Mod: HCNC,CPTII,S$GLB, | Performed by: NURSE PRACTITIONER

## 2019-07-30 PROCEDURE — 99999 PR PBB SHADOW E&M-EST. PATIENT-LVL III: ICD-10-PCS | Mod: PBBFAC,HCNC,, | Performed by: NURSE PRACTITIONER

## 2019-07-30 PROCEDURE — 3074F PR MOST RECENT SYSTOLIC BLOOD PRESSURE < 130 MM HG: ICD-10-PCS | Mod: HCNC,CPTII,S$GLB, | Performed by: NURSE PRACTITIONER

## 2019-07-30 PROCEDURE — 99999 PR PBB SHADOW E&M-EST. PATIENT-LVL III: CPT | Mod: PBBFAC,HCNC,, | Performed by: NURSE PRACTITIONER

## 2019-07-30 PROCEDURE — 3074F SYST BP LT 130 MM HG: CPT | Mod: HCNC,CPTII,S$GLB, | Performed by: NURSE PRACTITIONER

## 2019-07-30 RX ORDER — ESTRADIOL 0.1 MG/D
FILM, EXTENDED RELEASE TRANSDERMAL
Qty: 8 PATCH | Refills: 11 | Status: SHIPPED | OUTPATIENT
Start: 2019-07-30 | End: 2021-03-29

## 2019-07-30 RX ORDER — BISOPROLOL FUMARATE AND HYDROCHLOROTHIAZIDE 10; 6.25 MG/1; MG/1
TABLET ORAL
Qty: 90 TABLET | Refills: 3 | Status: SHIPPED | OUTPATIENT
Start: 2019-07-30 | End: 2020-02-11

## 2019-07-30 RX ORDER — GLIPIZIDE 2.5 MG/1
2.5 TABLET, EXTENDED RELEASE ORAL
Qty: 90 TABLET | Refills: 3 | Status: SHIPPED | OUTPATIENT
Start: 2019-07-30 | End: 2020-02-11

## 2020-01-30 ENCOUNTER — LAB VISIT (OUTPATIENT)
Dept: LAB | Facility: HOSPITAL | Age: 75
End: 2020-01-30
Attending: FAMILY MEDICINE
Payer: MEDICARE

## 2020-01-30 DIAGNOSIS — E11.9 TYPE 2 DIABETES MELLITUS WITHOUT COMPLICATION: ICD-10-CM

## 2020-01-30 DIAGNOSIS — E11.9 TYPE 2 DIABETES MELLITUS WITHOUT COMPLICATION, WITHOUT LONG-TERM CURRENT USE OF INSULIN: ICD-10-CM

## 2020-01-30 LAB
ALBUMIN SERPL BCP-MCNC: 3.8 G/DL (ref 3.5–5.2)
ALP SERPL-CCNC: 55 U/L (ref 55–135)
ALT SERPL W/O P-5'-P-CCNC: 46 U/L (ref 10–44)
ANION GAP SERPL CALC-SCNC: 10 MMOL/L (ref 8–16)
AST SERPL-CCNC: 34 U/L (ref 10–40)
BASOPHILS # BLD AUTO: 0.05 K/UL (ref 0–0.2)
BASOPHILS NFR BLD: 0.8 % (ref 0–1.9)
BILIRUB SERPL-MCNC: 0.4 MG/DL (ref 0.1–1)
BUN SERPL-MCNC: 14 MG/DL (ref 8–23)
CALCIUM SERPL-MCNC: 9.7 MG/DL (ref 8.7–10.5)
CHLORIDE SERPL-SCNC: 101 MMOL/L (ref 95–110)
CHOLEST SERPL-MCNC: 191 MG/DL (ref 120–199)
CHOLEST/HDLC SERPL: 4.7 {RATIO} (ref 2–5)
CO2 SERPL-SCNC: 29 MMOL/L (ref 23–29)
CREAT SERPL-MCNC: 0.8 MG/DL (ref 0.5–1.4)
DIFFERENTIAL METHOD: ABNORMAL
EOSINOPHIL # BLD AUTO: 0.3 K/UL (ref 0–0.5)
EOSINOPHIL NFR BLD: 4.7 % (ref 0–8)
ERYTHROCYTE [DISTWIDTH] IN BLOOD BY AUTOMATED COUNT: 12.9 % (ref 11.5–14.5)
EST. GFR  (AFRICAN AMERICAN): >60 ML/MIN/1.73 M^2
EST. GFR  (NON AFRICAN AMERICAN): >60 ML/MIN/1.73 M^2
ESTIMATED AVG GLUCOSE: 183 MG/DL (ref 68–131)
GLUCOSE SERPL-MCNC: 179 MG/DL (ref 70–110)
HBA1C MFR BLD HPLC: 8 % (ref 4–5.6)
HCT VFR BLD AUTO: 45.6 % (ref 37–48.5)
HDLC SERPL-MCNC: 41 MG/DL (ref 40–75)
HDLC SERPL: 21.5 % (ref 20–50)
HGB BLD-MCNC: 14.5 G/DL (ref 12–16)
IMM GRANULOCYTES # BLD AUTO: 0.02 K/UL (ref 0–0.04)
IMM GRANULOCYTES NFR BLD AUTO: 0.3 % (ref 0–0.5)
LDLC SERPL CALC-MCNC: 97 MG/DL (ref 63–159)
LYMPHOCYTES # BLD AUTO: 2.2 K/UL (ref 1–4.8)
LYMPHOCYTES NFR BLD: 33.7 % (ref 18–48)
MCH RBC QN AUTO: 31.1 PG (ref 27–31)
MCHC RBC AUTO-ENTMCNC: 31.8 G/DL (ref 32–36)
MCV RBC AUTO: 98 FL (ref 82–98)
MONOCYTES # BLD AUTO: 0.5 K/UL (ref 0.3–1)
MONOCYTES NFR BLD: 7.5 % (ref 4–15)
NEUTROPHILS # BLD AUTO: 3.4 K/UL (ref 1.8–7.7)
NEUTROPHILS NFR BLD: 53 % (ref 38–73)
NONHDLC SERPL-MCNC: 150 MG/DL
NRBC BLD-RTO: 0 /100 WBC
PLATELET # BLD AUTO: 190 K/UL (ref 150–350)
PMV BLD AUTO: 10.5 FL (ref 9.2–12.9)
POTASSIUM SERPL-SCNC: 4.2 MMOL/L (ref 3.5–5.1)
PROT SERPL-MCNC: 7.2 G/DL (ref 6–8.4)
RBC # BLD AUTO: 4.66 M/UL (ref 4–5.4)
SODIUM SERPL-SCNC: 140 MMOL/L (ref 136–145)
TRIGL SERPL-MCNC: 265 MG/DL (ref 30–150)
WBC # BLD AUTO: 6.38 K/UL (ref 3.9–12.7)

## 2020-01-30 PROCEDURE — 80053 COMPREHEN METABOLIC PANEL: CPT | Mod: HCNC

## 2020-01-30 PROCEDURE — 85025 COMPLETE CBC W/AUTO DIFF WBC: CPT | Mod: HCNC

## 2020-01-30 PROCEDURE — 36415 COLL VENOUS BLD VENIPUNCTURE: CPT | Mod: HCNC,PO

## 2020-01-30 PROCEDURE — 83036 HEMOGLOBIN GLYCOSYLATED A1C: CPT | Mod: HCNC

## 2020-01-30 PROCEDURE — 80061 LIPID PANEL: CPT | Mod: HCNC

## 2020-02-04 ENCOUNTER — OFFICE VISIT (OUTPATIENT)
Dept: FAMILY MEDICINE | Facility: CLINIC | Age: 75
End: 2020-02-04
Payer: MEDICARE

## 2020-02-04 VITALS
HEIGHT: 61 IN | DIASTOLIC BLOOD PRESSURE: 60 MMHG | TEMPERATURE: 98 F | WEIGHT: 137 LBS | HEART RATE: 65 BPM | BODY MASS INDEX: 25.86 KG/M2 | SYSTOLIC BLOOD PRESSURE: 130 MMHG

## 2020-02-04 DIAGNOSIS — R30.0 DYSURIA: Primary | ICD-10-CM

## 2020-02-04 LAB
AMORPH CRY URNS QL MICRO: ABNORMAL
BACTERIA #/AREA URNS HPF: ABNORMAL /HPF
BILIRUB UR QL STRIP: ABNORMAL
CLARITY UR: ABNORMAL
COLOR UR: ABNORMAL
GLUCOSE UR QL STRIP: ABNORMAL
HGB UR QL STRIP: ABNORMAL
KETONES UR QL STRIP: ABNORMAL
LEUKOCYTE ESTERASE UR QL STRIP: ABNORMAL
MICROSCOPIC COMMENT: ABNORMAL
NITRITE UR QL STRIP: ABNORMAL
PH UR STRIP: ABNORMAL [PH] (ref 5–8)
PROT UR QL STRIP: ABNORMAL
RBC #/AREA URNS HPF: 3 /HPF (ref 0–4)
SP GR UR STRIP: ABNORMAL (ref 1–1.03)
SQUAMOUS #/AREA URNS HPF: 4 /HPF
URN SPEC COLLECT METH UR: ABNORMAL
WBC #/AREA URNS HPF: 20 /HPF (ref 0–5)

## 2020-02-04 PROCEDURE — 1126F AMNT PAIN NOTED NONE PRSNT: CPT | Mod: HCNC,S$GLB,, | Performed by: FAMILY MEDICINE

## 2020-02-04 PROCEDURE — 3078F PR MOST RECENT DIASTOLIC BLOOD PRESSURE < 80 MM HG: ICD-10-PCS | Mod: HCNC,CPTII,S$GLB, | Performed by: FAMILY MEDICINE

## 2020-02-04 PROCEDURE — 1101F PR PT FALLS ASSESS DOC 0-1 FALLS W/OUT INJ PAST YR: ICD-10-PCS | Mod: HCNC,CPTII,S$GLB, | Performed by: FAMILY MEDICINE

## 2020-02-04 PROCEDURE — 99213 PR OFFICE/OUTPT VISIT, EST, LEVL III, 20-29 MIN: ICD-10-PCS | Mod: HCNC,S$GLB,, | Performed by: FAMILY MEDICINE

## 2020-02-04 PROCEDURE — 3075F SYST BP GE 130 - 139MM HG: CPT | Mod: HCNC,CPTII,S$GLB, | Performed by: FAMILY MEDICINE

## 2020-02-04 PROCEDURE — 99999 PR PBB SHADOW E&M-EST. PATIENT-LVL III: CPT | Mod: PBBFAC,HCNC,, | Performed by: FAMILY MEDICINE

## 2020-02-04 PROCEDURE — 3078F DIAST BP <80 MM HG: CPT | Mod: HCNC,CPTII,S$GLB, | Performed by: FAMILY MEDICINE

## 2020-02-04 PROCEDURE — 1159F MED LIST DOCD IN RCRD: CPT | Mod: HCNC,S$GLB,, | Performed by: FAMILY MEDICINE

## 2020-02-04 PROCEDURE — 1126F PR PAIN SEVERITY QUANTIFIED, NO PAIN PRESENT: ICD-10-PCS | Mod: HCNC,S$GLB,, | Performed by: FAMILY MEDICINE

## 2020-02-04 PROCEDURE — 1159F PR MEDICATION LIST DOCUMENTED IN MEDICAL RECORD: ICD-10-PCS | Mod: HCNC,S$GLB,, | Performed by: FAMILY MEDICINE

## 2020-02-04 PROCEDURE — 3075F PR MOST RECENT SYSTOLIC BLOOD PRESS GE 130-139MM HG: ICD-10-PCS | Mod: HCNC,CPTII,S$GLB, | Performed by: FAMILY MEDICINE

## 2020-02-04 PROCEDURE — 1101F PT FALLS ASSESS-DOCD LE1/YR: CPT | Mod: HCNC,CPTII,S$GLB, | Performed by: FAMILY MEDICINE

## 2020-02-04 PROCEDURE — 99999 PR PBB SHADOW E&M-EST. PATIENT-LVL III: ICD-10-PCS | Mod: PBBFAC,HCNC,, | Performed by: FAMILY MEDICINE

## 2020-02-04 PROCEDURE — 81000 URINALYSIS NONAUTO W/SCOPE: CPT | Mod: HCNC,PO

## 2020-02-04 PROCEDURE — 99213 OFFICE O/P EST LOW 20 MIN: CPT | Mod: HCNC,S$GLB,, | Performed by: FAMILY MEDICINE

## 2020-02-04 RX ORDER — SULFAMETHOXAZOLE AND TRIMETHOPRIM 800; 160 MG/1; MG/1
1 TABLET ORAL 2 TIMES DAILY
Qty: 14 TABLET | Refills: 0 | Status: SHIPPED | OUTPATIENT
Start: 2020-02-04 | End: 2020-02-11

## 2020-02-04 NOTE — PROGRESS NOTES
The patient presents with a history of dysuria and frequency,denies flank pain,vomiting or significant fever.   Past Medical History:  Past Medical History:   Diagnosis Date    Cancer     Cataract     OU    Hyperlipidemia 7/12/2018    Hypertension     PONV (postoperative nausea and vomiting)     SCC (squamous cell carcinoma) EXCISED  VIA MOHS    MID UPPER FOREHEAD    Squamous Cell Carcinoma     right scalp and mid nasal bridge    Type 2 diabetes mellitus      Past Surgical History:   Procedure Laterality Date    HYSTERECTOMY      SKIN BIOPSY       Review of patient's allergies indicates:  No Known Allergies  Current Outpatient Medications on File Prior to Visit   Medication Sig Dispense Refill    albuterol 90 mcg/actuation inhaler Inhale 2 puffs into the lungs every 6 (six) hours as needed for Wheezing. Rescue 18 g 6    bisoprolol-hydrochlorothiazide (ZIAC) 10-6.25 mg per tablet TAKE ONE TABLET BY MOUTH EVERY DAY 90 tablet 3    blood sugar diagnostic Strp TEST BLOOD SUGAR ONCE DAILY 300 each 3    estradiol (VIVELLE-DOT) 0.1 mg/24 hr PTSW APPLY 1 PATCH ONTO SKIN TWO TIMES A WEEK 8 patch 11    fluticasone (FLONASE) 50 mcg/actuation nasal spray 2 sprays (100 mcg total) by Each Nare route once daily. 16 g 6    glipiZIDE (GLUCOTROL) 2.5 MG TR24 Take 1 tablet (2.5 mg total) by mouth daily with breakfast. 90 tablet 3    albuterol (PROVENTIL/VENTOLIN HFA) 90 mcg/actuation inhaler INHALE TWO PUFFS BY MOUTH EVERY 6 HOURS AS NEEDED FOR WHEEZING(RESCUE) (Patient not taking: Reported on 2/4/2020) 18 g 4    cephALEXin (KEFLEX) 500 MG capsule Take 1 capsule (500 mg total) by mouth every 12 (twelve) hours. (Patient not taking: Reported on 2/4/2020) 14 capsule 1    FLUZONE HIGH-DOSE 2017-18, PF, 180 mcg/0.5 mL vaccine ADM 0.5ML IM UTD  0    simvastatin (ZOCOR) 5 MG tablet Take 1 tablet (5 mg total) by mouth every evening. 90 tablet 3     No current facility-administered medications on file prior to visit.       Social History     Socioeconomic History    Marital status:      Spouse name: Not on file    Number of children: Not on file    Years of education: Not on file    Highest education level: Not on file   Occupational History    Not on file   Social Needs    Financial resource strain: Not on file    Food insecurity:     Worry: Not on file     Inability: Not on file    Transportation needs:     Medical: Not on file     Non-medical: Not on file   Tobacco Use    Smoking status: Never Smoker    Smokeless tobacco: Never Used   Substance and Sexual Activity    Alcohol use: No    Drug use: No    Sexual activity: Not on file   Lifestyle    Physical activity:     Days per week: Not on file     Minutes per session: Not on file    Stress: Not on file   Relationships    Social connections:     Talks on phone: Not on file     Gets together: Not on file     Attends Taoist service: Not on file     Active member of club or organization: Not on file     Attends meetings of clubs or organizations: Not on file     Relationship status: Not on file   Other Topics Concern    Are you pregnant or think you may be? Not Asked    Breast-feeding Not Asked   Social History Narrative    Not on file     Family History   Problem Relation Age of Onset    Skin cancer Neg Hx     Melanoma Neg Hx        ROS: Denies weight loss  Respiratory/CV: No cough dyspnea or chest pain  GI:No nausea vomiting diarrhea  :Frequency dysuria  SKIN:No rashes or change in texture    PE Vital signs noted  Heent: Normocephalic,with no recent trauma,PERRLA,EOMI,conjunctiva clear with no exudate.Nasopharynx is not injected .Otic canal.TMs are not affected.Pharynx is normal.  Neck:Supple without adenopathy  Chest:Clear bilateral breath sounds normal  Heart:Regular rhthym without murmer  Abdomen:Soft, mild superpubic tenderness,no rebound,no masses, no hepatosplenomegaly,no flank tenderness  Extremeties and Neurologic:Grossly within normal  limits     U/A is abnormal     Impression: .0     Plan:    Bactrim and sym fu

## 2020-02-11 ENCOUNTER — OFFICE VISIT (OUTPATIENT)
Dept: FAMILY MEDICINE | Facility: CLINIC | Age: 75
End: 2020-02-11
Payer: MEDICARE

## 2020-02-11 ENCOUNTER — TELEPHONE (OUTPATIENT)
Dept: FAMILY MEDICINE | Facility: CLINIC | Age: 75
End: 2020-02-11

## 2020-02-11 VITALS
DIASTOLIC BLOOD PRESSURE: 56 MMHG | SYSTOLIC BLOOD PRESSURE: 128 MMHG | WEIGHT: 137.81 LBS | OXYGEN SATURATION: 98 % | TEMPERATURE: 98 F | RESPIRATION RATE: 18 BRPM | BODY MASS INDEX: 26.02 KG/M2 | HEART RATE: 60 BPM | HEIGHT: 61 IN

## 2020-02-11 DIAGNOSIS — I15.2 HYPERTENSION ASSOCIATED WITH DIABETES: ICD-10-CM

## 2020-02-11 DIAGNOSIS — E11.59 HYPERTENSION ASSOCIATED WITH DIABETES: ICD-10-CM

## 2020-02-11 DIAGNOSIS — E78.2 MIXED HYPERLIPIDEMIA: ICD-10-CM

## 2020-02-11 DIAGNOSIS — Z13.820 SCREENING FOR OSTEOPOROSIS: ICD-10-CM

## 2020-02-11 DIAGNOSIS — Z78.0 ASYMPTOMATIC MENOPAUSAL STATE: ICD-10-CM

## 2020-02-11 DIAGNOSIS — E66.3 OVERWEIGHT (BMI 25.0-29.9): ICD-10-CM

## 2020-02-11 DIAGNOSIS — E11.9 TYPE 2 DIABETES MELLITUS WITHOUT COMPLICATION, WITHOUT LONG-TERM CURRENT USE OF INSULIN: Primary | ICD-10-CM

## 2020-02-11 PROBLEM — E11.69 TYPE 2 DIABETES MELLITUS WITH HYPERTRIGLYCERIDEMIA: Status: ACTIVE | Noted: 2018-07-12

## 2020-02-11 PROBLEM — E78.1 TYPE 2 DIABETES MELLITUS WITH HYPERTRIGLYCERIDEMIA: Status: ACTIVE | Noted: 2018-07-12

## 2020-02-11 PROBLEM — H35.52 RETINITIS PIGMENTOSA, BOTH EYES: Chronic | Status: ACTIVE | Noted: 2018-03-19

## 2020-02-11 PROBLEM — E78.1 TYPE 2 DIABETES MELLITUS WITH HYPERTRIGLYCERIDEMIA: Chronic | Status: ACTIVE | Noted: 2018-07-12

## 2020-02-11 PROBLEM — H25.13 NS (NUCLEAR SCLEROSIS), BILATERAL: Chronic | Status: ACTIVE | Noted: 2018-03-19

## 2020-02-11 PROBLEM — E11.69 TYPE 2 DIABETES MELLITUS WITH HYPERTRIGLYCERIDEMIA: Chronic | Status: ACTIVE | Noted: 2018-07-12

## 2020-02-11 PROBLEM — H35.3132 NONEXUDATIVE AGE-RELATED MACULAR DEGENERATION, BILATERAL, INTERMEDIATE DRY STAGE: Chronic | Status: ACTIVE | Noted: 2018-03-19

## 2020-02-11 PROCEDURE — 99499 RISK ADDL DX/OHS AUDIT: ICD-10-PCS | Mod: HCNC,S$GLB,, | Performed by: INTERNAL MEDICINE

## 2020-02-11 PROCEDURE — 99214 PR OFFICE/OUTPT VISIT, EST, LEVL IV, 30-39 MIN: ICD-10-PCS | Mod: HCNC,S$GLB,, | Performed by: INTERNAL MEDICINE

## 2020-02-11 PROCEDURE — 3052F PR MOST RECENT HEMOGLOBIN A1C LEVEL 8.0 - < 9.0%: ICD-10-PCS | Mod: HCNC,CPTII,S$GLB, | Performed by: INTERNAL MEDICINE

## 2020-02-11 PROCEDURE — 99999 PR PBB SHADOW E&M-EST. PATIENT-LVL IV: ICD-10-PCS | Mod: PBBFAC,HCNC,, | Performed by: INTERNAL MEDICINE

## 2020-02-11 PROCEDURE — 3052F HG A1C>EQUAL 8.0%<EQUAL 9.0%: CPT | Mod: HCNC,CPTII,S$GLB, | Performed by: INTERNAL MEDICINE

## 2020-02-11 PROCEDURE — 99499 UNLISTED E&M SERVICE: CPT | Mod: HCNC,S$GLB,, | Performed by: INTERNAL MEDICINE

## 2020-02-11 PROCEDURE — 99214 OFFICE O/P EST MOD 30 MIN: CPT | Mod: HCNC,S$GLB,, | Performed by: INTERNAL MEDICINE

## 2020-02-11 PROCEDURE — 99999 PR PBB SHADOW E&M-EST. PATIENT-LVL IV: CPT | Mod: PBBFAC,HCNC,, | Performed by: INTERNAL MEDICINE

## 2020-02-11 RX ORDER — LOSARTAN POTASSIUM 25 MG/1
25 TABLET ORAL DAILY
Qty: 30 TABLET | Refills: 0 | Status: SHIPPED | OUTPATIENT
Start: 2020-02-11 | End: 2020-03-11 | Stop reason: SDUPTHER

## 2020-02-11 RX ORDER — ROSUVASTATIN CALCIUM 5 MG/1
5 TABLET, COATED ORAL DAILY
Qty: 90 TABLET | Refills: 0 | Status: SHIPPED | OUTPATIENT
Start: 2020-02-11 | End: 2020-04-24 | Stop reason: SDUPTHER

## 2020-02-11 NOTE — PROGRESS NOTES
Subjective:      Patient ID: Saumya Braun is a 74 y.o. female.    Chief Complaint: Diabetes    HPI     74F here to review recent test results and transition care from Dr. Kang.     We review her medical history together.  She has diabetes that has been uncontrolled for many years.  She reports that metformin caused GI intolerance and in the past was on Januvia, however this was limited by insurance.  She is now on glipizide 2.5 mg daily.  We review her blood sugars together on a log she has brought from home.  Her blood sugar in the morning fasting is not consistent with her lowest blood sugar 93 and August 2019 and highest 208 in December 2019.  On average her morning blood sugars are 140-150.  Blood pressures are sporadically taken as well and most recent was 157/72.  Blood pressure today 128/56.  She is on ziac 10/6.25 mg daily.  We also reviewed that she needs a bone density.  Her last was at least 15 years ago.  Her immunizations are up-to-date, but Tdap and shingles deferred due to cost.    Her laboratory data again shows hemoglobin A1c is elevated 8%, which is increased from prior.  Cholesterol panel shows hypertriglyceridemia.  LDL 97.  Simvastatin was called in by former PCP, but she did not fill.  We discussed the importance of LDL goal 70 and triglyceride goal 135 in diabetes.  Lipitor caused muscle pain in the past.  Crestor 5 mg called to pharmacy and advised to take fish oil twice daily.  Microalbumin/creatinine ratio not elevated.  She describes some episodes of lightheadedness, especially with standing.  We discussed discontinuing the blood pressure medication and starting losartan 25 mg daily for blood pressure goal 130/80.  We discussed stopping the glipizide and restarting the Januvia 100 mg daily.  Renal function is normal.  We discussed other alternative options if not covered by insurance.  Discussed the importance of tight glycemic control in complications of longstanding uncontrolled  diabetes.  She will return in approximately 1 month to review.        Review of patient's allergies indicates:  No Known Allergies    Current Outpatient Medications:     albuterol 90 mcg/actuation inhaler, Inhale 2 puffs into the lungs every 6 (six) hours as needed for Wheezing. Rescue, Disp: 18 g, Rfl: 6    blood sugar diagnostic Strp, TEST BLOOD SUGAR ONCE DAILY, Disp: 300 each, Rfl: 3    estradiol (VIVELLE-DOT) 0.1 mg/24 hr PTSW, APPLY 1 PATCH ONTO SKIN TWO TIMES A WEEK, Disp: 8 patch, Rfl: 11    fluticasone (FLONASE) 50 mcg/actuation nasal spray, 2 sprays (100 mcg total) by Each Nare route once daily., Disp: 16 g, Rfl: 6    losartan (COZAAR) 25 MG tablet, Take 1 tablet (25 mg total) by mouth once daily., Disp: 30 tablet, Rfl: 0    omega 3-dha-epa-fish oil (FISH OIL) 138-183-1,000 mg Cap, Take 1 capsule by mouth 2 (two) times daily., Disp: 180 capsule, Rfl: 0    rosuvastatin (CRESTOR) 5 MG tablet, Take 1 tablet (5 mg total) by mouth once daily., Disp: 90 tablet, Rfl: 0    SITagliptin (JANUVIA) 100 MG Tab, Take 1 tablet (100 mg total) by mouth once daily., Disp: 30 tablet, Rfl: 3    Past Medical History:   Diagnosis Date    Cataract     OU    Hypertension associated with diabetes 12/8/2015    SCC (squamous cell carcinoma) EXCISED  VIA MOHS    MID UPPER FOREHEAD    Squamous Cell Carcinoma     right scalp and mid nasal bridge    Type 2 diabetes mellitus with hyperglycemia, without long-term current use of insulin 10/24/2015     Past Surgical History:   Procedure Laterality Date    HYSTERECTOMY      SKIN BIOPSY       Family History   Problem Relation Age of Onset    No Known Problems Mother     No Known Problems Father     Skin cancer Neg Hx     Melanoma Neg Hx      Social History     Socioeconomic History    Marital status:      Spouse name: Not on file    Number of children: Not on file    Years of education: Not on file    Highest education level: Not on file   Occupational  History    Not on file   Social Needs    Financial resource strain: Not on file    Food insecurity:     Worry: Not on file     Inability: Not on file    Transportation needs:     Medical: Not on file     Non-medical: Not on file   Tobacco Use    Smoking status: Never Smoker    Smokeless tobacco: Never Used   Substance and Sexual Activity    Alcohol use: No    Drug use: No    Sexual activity: Not Currently     Partners: Male     Birth control/protection: Surgical   Lifestyle    Physical activity:     Days per week: Not on file     Minutes per session: Not on file    Stress: Not on file   Relationships    Social connections:     Talks on phone: Not on file     Gets together: Not on file     Attends Orthodox service: Not on file     Active member of club or organization: Not on file     Attends meetings of clubs or organizations: Not on file     Relationship status: Not on file   Other Topics Concern    Are you pregnant or think you may be? Not Asked    Breast-feeding Not Asked   Social History Narrative    Not on file      Review of Systems   Constitutional: Negative for chills, fever and unexpected weight change.   HENT: Negative for congestion.    Eyes: Positive for visual disturbance (follows with opht).   Respiratory: Negative for cough, shortness of breath and wheezing.    Cardiovascular: Negative for chest pain and palpitations.   Gastrointestinal: Negative for abdominal pain and nausea.   Endocrine: Negative for cold intolerance and heat intolerance.   Genitourinary: Negative for dysuria.   Musculoskeletal: Negative for back pain and myalgias.   Skin: Negative for rash.   Allergic/Immunologic: Negative for environmental allergies.   Neurological: Positive for light-headedness (when standing). Negative for dizziness, syncope and headaches.   Hematological: Does not bruise/bleed easily.   Psychiatric/Behavioral: Negative for dysphoric mood and sleep disturbance.         Objective:     Body mass  "index is 26.04 kg/m².  BP (!) 128/56   Pulse 60   Temp 97.5 °F (36.4 °C) (Oral)   Resp 18   Ht 5' 1" (1.549 m)   Wt 62.5 kg (137 lb 12.8 oz)   LMP 11/14/1986   SpO2 98%   BMI 26.04 kg/m²       Physical Exam   Constitutional: She is oriented to person, place, and time. She appears well-developed and well-nourished. No distress.   HENT:   Head: Normocephalic.   Mouth/Throat: Oropharynx is clear and moist.   Eyes: Conjunctivae are normal.   Cardiovascular: Normal rate, regular rhythm, normal heart sounds and intact distal pulses.   No murmur heard.  Pulmonary/Chest: Effort normal and breath sounds normal.   Abdominal: Soft. Bowel sounds are normal.   Musculoskeletal: She exhibits no tenderness.   Lymphadenopathy:     She has no cervical adenopathy.   Neurological: She is alert and oriented to person, place, and time. No sensory deficit.   Skin: Skin is warm and dry. Capillary refill takes 2 to 3 seconds. She is not diaphoretic.   Psychiatric: She has a normal mood and affect. Her behavior is normal.   Nursing note and vitals reviewed.      Office Visit on 02/04/2020   Component Date Value Ref Range Status    Specimen UA 02/04/2020 Urine, Unspecified   Final    Color, UA 02/04/2020 Orange* Yellow, Straw, Lou Final    Appearance, UA 02/04/2020 Cloudy* Clear Final    pH, UA 02/04/2020 SEE COMMENT  5.0 - 8.0 Final    Color may interfere with results    Specific [place], UA 02/04/2020 SEE COMMENT  1.005 - 1.030 Final    Color may interfere with results    Protein, UA 02/04/2020 SEE COMMENT  Negative Final    Comment: Recommend a 24 hour urine protein or a urine   protein/creatinine ratio if globulin induced proteinuria is  clinically suspected.  Color may interfere with results      Glucose, UA 02/04/2020 SEE COMMENT  Negative Final    Color may interfere with results    Ketones, UA 02/04/2020 SEE COMMENT  Negative Final    Color may interfere with results    Bilirubin (UA) 02/04/2020 SEE COMMENT  " Negative Final    Color may interfere with results    Occult Blood UA 02/04/2020 SEE COMMENT  Negative Final    Color may interfere with results    Nitrite, UA 02/04/2020 SEE COMMENT  Negative Final    Color may interfere with results    Leukocytes, UA 02/04/2020 SEE COMMENT  Negative Final    Color may interfere with results    RBC, UA 02/04/2020 3  0 - 4 /hpf Final    WBC, UA 02/04/2020 20* 0 - 5 /hpf Final    Bacteria 02/04/2020 Few* None-Occ /hpf Final    Squam Epithel, UA 02/04/2020 4  /hpf Final    Amorphous, UA 02/04/2020 Moderate  None-Moderate Final    Microscopic Comment 02/04/2020 SEE COMMENT   Final    Microscopic done on unspun urine due to small specimen size.   Lab Visit on 01/30/2020   Component Date Value Ref Range Status    Microalbum.,U,Random 01/30/2020 17.0  ug/mL Final    Creatinine, Random Ur 01/30/2020 218.0  15.0 - 325.0 mg/dL Final    Comment: The random urine reference ranges provided were established   for 24 hour urine collections.  No reference ranges exist for  random urine specimens.  Correlate clinically.      Microalb Creat Ratio 01/30/2020 7.8  0.0 - 30.0 ug/mg Final   Lab Visit on 01/30/2020   Component Date Value Ref Range Status    Sodium 01/30/2020 140  136 - 145 mmol/L Final    Potassium 01/30/2020 4.2  3.5 - 5.1 mmol/L Final    Chloride 01/30/2020 101  95 - 110 mmol/L Final    CO2 01/30/2020 29  23 - 29 mmol/L Final    Glucose 01/30/2020 179* 70 - 110 mg/dL Final    BUN, Bld 01/30/2020 14  8 - 23 mg/dL Final    Creatinine 01/30/2020 0.8  0.5 - 1.4 mg/dL Final    Calcium 01/30/2020 9.7  8.7 - 10.5 mg/dL Final    Total Protein 01/30/2020 7.2  6.0 - 8.4 g/dL Final    Albumin 01/30/2020 3.8  3.5 - 5.2 g/dL Final    Total Bilirubin 01/30/2020 0.4  0.1 - 1.0 mg/dL Final    Comment: For infants and newborns, interpretation of results should be based  on gestational age, weight and in agreement with clinical  observations.  Premature Infant recommended  reference ranges:  Up to 24 hours.............<8.0 mg/dL  Up to 48 hours............<12.0 mg/dL  3-5 days..................<15.0 mg/dL  6-29 days.................<15.0 mg/dL      Alkaline Phosphatase 01/30/2020 55  55 - 135 U/L Final    AST 01/30/2020 34  10 - 40 U/L Final    ALT 01/30/2020 46* 10 - 44 U/L Final    Anion Gap 01/30/2020 10  8 - 16 mmol/L Final    eGFR if African American 01/30/2020 >60.0  >60 mL/min/1.73 m^2 Final    eGFR if non African American 01/30/2020 >60.0  >60 mL/min/1.73 m^2 Final    Comment: Calculation used to obtain the estimated glomerular filtration  rate (eGFR) is the CKD-EPI equation.       Cholesterol 01/30/2020 191  120 - 199 mg/dL Final    Comment: The National Cholesterol Education Program (NCEP) has set the  following guidelines (reference ranges) for Cholesterol:  Optimal.....................<200 mg/dL  Borderline High.............200-239 mg/dL  High........................> or = 240 mg/dL      Triglycerides 01/30/2020 265* 30 - 150 mg/dL Final    Comment: The National Cholesterol Education Program (NCEP) has set the  following guidelines (reference values) for triglycerides:  Normal......................<150 mg/dL  Borderline High.............150-199 mg/dL  High........................200-499 mg/dL      HDL 01/30/2020 41  40 - 75 mg/dL Final    Comment: The National Cholesterol Education Program (NCEP) has set the  following guidelines (reference values) for HDL Cholesterol:  Low...............<40 mg/dL  Optimal...........>60 mg/dL      LDL Cholesterol 01/30/2020 97.0  63.0 - 159.0 mg/dL Final    Comment: The National Cholesterol Education Program (NCEP) has set the  following guidelines (reference values) for LDL Cholesterol:  Optimal.......................<130 mg/dL  Borderline High...............130-159 mg/dL  High..........................160-189 mg/dL  Very High.....................>190 mg/dL      Hdl/Cholesterol Ratio 01/30/2020 21.5  20.0 - 50.0 % Final     Total Cholesterol/HDL Ratio 01/30/2020 4.7  2.0 - 5.0 Final    Non-HDL Cholesterol 01/30/2020 150  mg/dL Final    Comment: Risk category and Non-HDL cholesterol goals:  Coronary heart disease (CHD)or equivalent (10-year risk of CHD >20%):  Non-HDL cholesterol goal     <130 mg/dL  Two or more CHD risk factors and 10-year risk of CHD <= 20%:  Non-HDL cholesterol goal     <160 mg/dL  0 to 1 CHD risk factor:  Non-HDL cholesterol goal     <190 mg/dL      Hemoglobin A1C 01/30/2020 8.0* 4.0 - 5.6 % Final    Comment: ADA Screening Guidelines:  5.7-6.4%  Consistent with prediabetes  >or=6.5%  Consistent with diabetes  High levels of fetal hemoglobin interfere with the HbA1C  assay. Heterozygous hemoglobin variants (HbS, HgC, etc)do  not significantly interfere with this assay.   However, presence of multiple variants may affect accuracy.      Estimated Avg Glucose 01/30/2020 183* 68 - 131 mg/dL Final    WBC 01/30/2020 6.38  3.90 - 12.70 K/uL Final    RBC 01/30/2020 4.66  4.00 - 5.40 M/uL Final    Hemoglobin 01/30/2020 14.5  12.0 - 16.0 g/dL Final    Hematocrit 01/30/2020 45.6  37.0 - 48.5 % Final    Mean Corpuscular Volume 01/30/2020 98  82 - 98 fL Final    Mean Corpuscular Hemoglobin 01/30/2020 31.1* 27.0 - 31.0 pg Final    Mean Corpuscular Hemoglobin Conc 01/30/2020 31.8* 32.0 - 36.0 g/dL Final    RDW 01/30/2020 12.9  11.5 - 14.5 % Final    Platelets 01/30/2020 190  150 - 350 K/uL Final    MPV 01/30/2020 10.5  9.2 - 12.9 fL Final    Immature Granulocytes 01/30/2020 0.3  0.0 - 0.5 % Final    Gran # (ANC) 01/30/2020 3.4  1.8 - 7.7 K/uL Final    Immature Grans (Abs) 01/30/2020 0.02  0.00 - 0.04 K/uL Final    Comment: Mild elevation in immature granulocytes is non specific and   can be seen in a variety of conditions including stress response,   acute inflammation, trauma and pregnancy. Correlation with other   laboratory and clinical findings is essential.      Lymph # 01/30/2020 2.2  1.0 - 4.8 K/uL Final     Mono # 01/30/2020 0.5  0.3 - 1.0 K/uL Final    Eos # 01/30/2020 0.3  0.0 - 0.5 K/uL Final    Baso # 01/30/2020 0.05  0.00 - 0.20 K/uL Final    nRBC 01/30/2020 0  0 /100 WBC Final    Gran% 01/30/2020 53.0  38.0 - 73.0 % Final    Lymph% 01/30/2020 33.7  18.0 - 48.0 % Final    Mono% 01/30/2020 7.5  4.0 - 15.0 % Final    Eosinophil% 01/30/2020 4.7  0.0 - 8.0 % Final    Basophil% 01/30/2020 0.8  0.0 - 1.9 % Final    Differential Method 01/30/2020 Automated   Final     No results found in the last 24 hours.   Assessment:       ICD-10-CM ICD-9-CM   1. Type 2 diabetes mellitus without complication, without long-term current use of insulin E11.9 250.00   2. Screening for osteoporosis Z13.820 V82.81   3. Mixed hyperlipidemia E78.2 272.2   4. Hypertension associated with diabetes E11.59 250.80    I10 401.9   5. Asymptomatic menopausal state  Z78.0 V49.81   6. Overweight (BMI 25.0-29.9) E66.3 278.02       Plan:       #NIDDM2  -ha1c peak 10.9% in 9/2016 --> 7.3% 7/2019 --> 8% 1/30/20  -Eye exam 4/1/19. Macular Degeneration. No retinopathy. Dr. Jacobs.  -1/30/20: cr 0.8, GFR >60, LDL 97, tri 265, micro/cr ratio 7.8  -START crestor 5 mg daily (lipitor 10 caused myalgias, didn't take simvastatin) & fish oil BID  -metformin caused GI upset  -STOP glipizide 2.5 mg daily.   -START januvia 100 mg daily (was on in past and limited by insurance). Advised if any issues we can change to alternative, but MUST get control of DM.     #HTN  -/56, HR 60. Suspect some orthostasis.   -STOP bisoprolol/hctz 10/6.25 mg daily. START losartan 25 mg daily.   -BP goal 120/80.     #Hypertriglyceridemia  -1/30/20: chol 191, hdl 41, ldl 97, tri 265  -advised DM goal LDL 70, tri <135.  -start fish oil and crestor as above     #Overweight- BMI 26.04  -noted. Discussed healthy diet with carb restriction.     #Elevated ALT  -1/30/20: ALT 46. Normal in past. Discussed likely due to triglycerides  -discussed healthy diet. Repeat on next labs.      #HCM  -Influenza 9/25/19  -TDAP & shingrix deferred due to cost  -Prevnar 12/7/15. Pneumovax 8/6/18  -Mammogram no longer indicated >74 y/o  -DEXA ordered (>15 years ago)  -Colonoscopy deferred given age.   -1/30/20: cbc normal     Has jose Gerard M.D.    Orders Placed This Encounter   Procedures    DXA Bone Density Spine And Hip      Medications Ordered This Encounter   Medications    losartan (COZAAR) 25 MG tablet     Sig: Take 1 tablet (25 mg total) by mouth once daily.     Dispense:  30 tablet     Refill:  0    omega 3-dha-epa-fish oil (FISH OIL) 138-183-1,000 mg Cap     Sig: Take 1 capsule by mouth 2 (two) times daily.     Dispense:  180 capsule     Refill:  0    rosuvastatin (CRESTOR) 5 MG tablet     Sig: Take 1 tablet (5 mg total) by mouth once daily.     Dispense:  90 tablet     Refill:  0    SITagliptin (JANUVIA) 100 MG Tab     Sig: Take 1 tablet (100 mg total) by mouth once daily.     Dispense:  30 tablet     Refill:  3

## 2020-02-11 NOTE — PATIENT INSTRUCTIONS
1. STOP ziac.     2. START losartan 25 mg daily (blood pressure). Aim for goal 120/80    3. STOP glipizide.     4. START januvia 100 mg daily (you took in past). Let me know on cost.     5. Start fish oil twice daily (sent to dons)    6. Start crestor 5 mg daily (protects you from heart attack and stroke. Lowest dose).     7. Keep blood sugar log like you've been doing. Take one here and there around dinner time.    Come back in 1 month.     Mary Ann Gerard M.D.

## 2020-02-11 NOTE — TELEPHONE ENCOUNTER
Spoke to pharmacy, insurance will not cover the prescription fish oil, I advised pharmacy to ask her to get medication OTC.

## 2020-02-11 NOTE — TELEPHONE ENCOUNTER
----- Message from Florence Paris sent at 2/11/2020  1:53 PM CST -----  Contact: Ms Silvia Salvador Jud [facility]- [phone]  Would like to consult with the nurse, Need to speak with the nurse concerning an Medication that was sent  Over for the Patient , Please call  Back at [phone], Thanks sj

## 2020-02-13 ENCOUNTER — HOSPITAL ENCOUNTER (OUTPATIENT)
Dept: RADIOLOGY | Facility: HOSPITAL | Age: 75
Discharge: HOME | End: 2020-02-13
Attending: INTERNAL MEDICINE
Payer: MEDICARE

## 2020-02-13 DIAGNOSIS — Z13.820 SCREENING FOR OSTEOPOROSIS: ICD-10-CM

## 2020-02-13 DIAGNOSIS — Z78.0 ASYMPTOMATIC MENOPAUSAL STATE: ICD-10-CM

## 2020-02-13 PROCEDURE — 77080 DXA BONE DENSITY AXIAL: CPT | Mod: 26,HCNC,, | Performed by: RADIOLOGY

## 2020-02-13 PROCEDURE — 77080 DXA BONE DENSITY AXIAL: CPT | Mod: TC,HCNC,PO

## 2020-02-13 PROCEDURE — 77080 DEXA BONE DENSITY SPINE HIP: ICD-10-PCS | Mod: 26,HCNC,, | Performed by: RADIOLOGY

## 2020-03-11 ENCOUNTER — OFFICE VISIT (OUTPATIENT)
Dept: FAMILY MEDICINE | Facility: CLINIC | Age: 75
End: 2020-03-11
Payer: MEDICARE

## 2020-03-11 VITALS
HEART RATE: 72 BPM | SYSTOLIC BLOOD PRESSURE: 150 MMHG | WEIGHT: 137.81 LBS | DIASTOLIC BLOOD PRESSURE: 62 MMHG | OXYGEN SATURATION: 99 % | RESPIRATION RATE: 16 BRPM | HEIGHT: 61 IN | BODY MASS INDEX: 26.02 KG/M2 | TEMPERATURE: 98 F

## 2020-03-11 DIAGNOSIS — E11.69 TYPE 2 DIABETES MELLITUS WITH HYPERTRIGLYCERIDEMIA: Chronic | ICD-10-CM

## 2020-03-11 DIAGNOSIS — E78.1 TYPE 2 DIABETES MELLITUS WITH HYPERTRIGLYCERIDEMIA: Chronic | ICD-10-CM

## 2020-03-11 DIAGNOSIS — E66.3 OVERWEIGHT (BMI 25.0-29.9): ICD-10-CM

## 2020-03-11 DIAGNOSIS — Z79.899 ENCOUNTER FOR LONG-TERM (CURRENT) USE OF MEDICATIONS: ICD-10-CM

## 2020-03-11 DIAGNOSIS — E11.65 TYPE 2 DIABETES MELLITUS WITH HYPERGLYCEMIA, WITHOUT LONG-TERM CURRENT USE OF INSULIN: Primary | Chronic | ICD-10-CM

## 2020-03-11 DIAGNOSIS — E11.59 HYPERTENSION ASSOCIATED WITH DIABETES: ICD-10-CM

## 2020-03-11 DIAGNOSIS — I15.2 HYPERTENSION ASSOCIATED WITH DIABETES: ICD-10-CM

## 2020-03-11 PROCEDURE — 3052F HG A1C>EQUAL 8.0%<EQUAL 9.0%: CPT | Mod: HCNC,CPTII,S$GLB, | Performed by: INTERNAL MEDICINE

## 2020-03-11 PROCEDURE — 99214 OFFICE O/P EST MOD 30 MIN: CPT | Mod: HCNC,S$GLB,, | Performed by: INTERNAL MEDICINE

## 2020-03-11 PROCEDURE — 99214 PR OFFICE/OUTPT VISIT, EST, LEVL IV, 30-39 MIN: ICD-10-PCS | Mod: HCNC,S$GLB,, | Performed by: INTERNAL MEDICINE

## 2020-03-11 PROCEDURE — 3052F PR MOST RECENT HEMOGLOBIN A1C LEVEL 8.0 - < 9.0%: ICD-10-PCS | Mod: HCNC,CPTII,S$GLB, | Performed by: INTERNAL MEDICINE

## 2020-03-11 PROCEDURE — 99999 PR PBB SHADOW E&M-EST. PATIENT-LVL III: ICD-10-PCS | Mod: PBBFAC,HCNC,, | Performed by: INTERNAL MEDICINE

## 2020-03-11 PROCEDURE — 99999 PR PBB SHADOW E&M-EST. PATIENT-LVL III: CPT | Mod: PBBFAC,HCNC,, | Performed by: INTERNAL MEDICINE

## 2020-03-11 RX ORDER — LOSARTAN POTASSIUM 50 MG/1
50 TABLET ORAL DAILY
Qty: 30 TABLET | Refills: 0 | Status: SHIPPED | OUTPATIENT
Start: 2020-03-11 | End: 2020-09-09

## 2020-03-11 NOTE — PROGRESS NOTES
Subjective:      Patient ID: Saumya Braun is a 75 y.o. female.    Chief Complaint: Diabetes; Hyperlipidemia; and Hypertension    Follow-up   Pertinent negatives include no abdominal pain, chest pain, chills, congestion, coughing, fever, headaches, myalgias, nausea or rash.      75F here for 1 month f/up.     After our last visit she was started on Crestor 5 mg daily, fish oil, losartan 25 mg daily, and Januvia 100 mg daily.  The ziac and glipizide were discontinued.  Blood pressure in clinic that day was 128/56.  Last A1c was 8%, which had been increased, LDL was 97, and triglycerides were elevated.    We review her home log together, which shows that the medication began on 02/12/2020.  Her sugars initially were 121 and blood pressure is 133/69, however her blood sugars have remained in the 170s fasting in the mornings.  The lowest blood sugar has been 165.  The highest blood sugar is 225.  Blood pressures on average are very sporadic, but appears to be 140-170/70-80.  Today in clinic her blood pressure is 150/62.  She reports that overall she feels much better, but is concerned about her numbers.  She does state that she may be eating more carbs than she should.  She reports metformin intolerance in the past, Actos intolerance, and lack of control with the glipizide.  She is unsure if she truly tried the extended release metformin.  None the less, we have decided to add Jardiance 10 mg daily.  Advised on side effect profile importance of hydration.  We will increase her losartan from 25 up to 50 mg daily.  Advised of blood pressure goal 120/80.    She will return for fasting labs around June 12th and see me after.  Advised to reach out to me if there are any issues at the pharmacy was cost.    Review of patient's allergies indicates:  No Known Allergies    Current Outpatient Medications:     albuterol 90 mcg/actuation inhaler, Inhale 2 puffs into the lungs every 6 (six) hours as needed for Wheezing. Rescue,  Disp: 18 g, Rfl: 6    blood sugar diagnostic Strp, TEST BLOOD SUGAR ONCE DAILY, Disp: 300 each, Rfl: 3    estradiol (VIVELLE-DOT) 0.1 mg/24 hr PTSW, APPLY 1 PATCH ONTO SKIN TWO TIMES A WEEK, Disp: 8 patch, Rfl: 11    fluticasone (FLONASE) 50 mcg/actuation nasal spray, 2 sprays (100 mcg total) by Each Nare route once daily., Disp: 16 g, Rfl: 6    losartan (COZAAR) 50 MG tablet, Take 1 tablet (50 mg total) by mouth once daily., Disp: 30 tablet, Rfl: 0    omega 3-dha-epa-fish oil (FISH OIL) 138-183-1,000 mg Cap, Take 1 capsule by mouth 2 (two) times daily., Disp: 180 capsule, Rfl: 0    rosuvastatin (CRESTOR) 5 MG tablet, Take 1 tablet (5 mg total) by mouth once daily., Disp: 90 tablet, Rfl: 0    SITagliptin (JANUVIA) 100 MG Tab, Take 1 tablet (100 mg total) by mouth once daily., Disp: 30 tablet, Rfl: 3    empagliflozin (JARDIANCE) 10 mg tablet, Take 1 tablet (10 mg total) by mouth once daily., Disp: 30 tablet, Rfl: 0    Past Medical History:   Diagnosis Date    Cataract     OU    Hypertension associated with diabetes 12/8/2015    SCC (squamous cell carcinoma) EXCISED  VIA MOHS    MID UPPER FOREHEAD    Squamous Cell Carcinoma     right scalp and mid nasal bridge    Type 2 diabetes mellitus with hyperglycemia, without long-term current use of insulin 10/24/2015     Past Surgical History:   Procedure Laterality Date    HYSTERECTOMY      SKIN BIOPSY       Family History   Problem Relation Age of Onset    No Known Problems Mother     No Known Problems Father     Skin cancer Neg Hx     Melanoma Neg Hx      Social History     Socioeconomic History    Marital status:      Spouse name: Not on file    Number of children: Not on file    Years of education: Not on file    Highest education level: Not on file   Occupational History    Not on file   Social Needs    Financial resource strain: Not on file    Food insecurity:     Worry: Not on file     Inability: Not on file    Transportation  "needs:     Medical: Not on file     Non-medical: Not on file   Tobacco Use    Smoking status: Never Smoker    Smokeless tobacco: Never Used   Substance and Sexual Activity    Alcohol use: No    Drug use: No    Sexual activity: Not Currently     Partners: Male     Birth control/protection: Surgical   Lifestyle    Physical activity:     Days per week: Not on file     Minutes per session: Not on file    Stress: Not on file   Relationships    Social connections:     Talks on phone: Not on file     Gets together: Not on file     Attends Latter-day service: Not on file     Active member of club or organization: Not on file     Attends meetings of clubs or organizations: Not on file     Relationship status: Not on file   Other Topics Concern    Are you pregnant or think you may be? Not Asked    Breast-feeding Not Asked   Social History Narrative    Not on file      Review of Systems   Constitutional: Negative for chills, fever and unexpected weight change.   HENT: Negative for congestion.    Eyes: Positive for visual disturbance (follows with opht).   Respiratory: Negative for cough, shortness of breath and wheezing.    Cardiovascular: Negative for chest pain and palpitations.   Gastrointestinal: Negative for abdominal pain and nausea.   Endocrine: Negative for cold intolerance and heat intolerance.   Genitourinary: Negative for dysuria.   Musculoskeletal: Negative for back pain and myalgias.   Skin: Negative for rash.   Allergic/Immunologic: Negative for environmental allergies.   Neurological: Negative for dizziness, syncope, light-headedness and headaches.   Hematological: Does not bruise/bleed easily.   Psychiatric/Behavioral: Negative for dysphoric mood and sleep disturbance.         Objective:     Body mass index is 26.04 kg/m².  BP (!) 150/62   Pulse 72   Temp 98.3 °F (36.8 °C) (Oral)   Resp 16   Ht 5' 1" (1.549 m)   Wt 62.5 kg (137 lb 12.8 oz)   LMP 11/14/1986   SpO2 99%   BMI 26.04 kg/m²     "   Physical Exam   Constitutional: She is oriented to person, place, and time. She appears well-developed and well-nourished. No distress.   HENT:   Head: Normocephalic.   Mouth/Throat: Oropharynx is clear and moist.   Eyes: Conjunctivae are normal.   Cardiovascular: Normal rate, regular rhythm, normal heart sounds and intact distal pulses.   No murmur heard.  Pulmonary/Chest: Effort normal and breath sounds normal.   Abdominal: Soft. Bowel sounds are normal.   Musculoskeletal: She exhibits no tenderness.   Lymphadenopathy:     She has no cervical adenopathy.   Neurological: She is alert and oriented to person, place, and time. No sensory deficit.   Skin: Skin is warm and dry. Capillary refill takes 2 to 3 seconds. She is not diaphoretic.   Psychiatric: She has a normal mood and affect. Her behavior is normal.   Nursing note and vitals reviewed.      Office Visit on 02/04/2020   Component Date Value Ref Range Status    Specimen UA 02/04/2020 Urine, Unspecified   Final    Color, UA 02/04/2020 Orange* Yellow, Straw, Lou Final    Appearance, UA 02/04/2020 Cloudy* Clear Final    pH, UA 02/04/2020 SEE COMMENT  5.0 - 8.0 Final    Color may interfere with results    Specific Julian, UA 02/04/2020 SEE COMMENT  1.005 - 1.030 Final    Color may interfere with results    Protein, UA 02/04/2020 SEE COMMENT  Negative Final    Comment: Recommend a 24 hour urine protein or a urine   protein/creatinine ratio if globulin induced proteinuria is  clinically suspected.  Color may interfere with results      Glucose, UA 02/04/2020 SEE COMMENT  Negative Final    Color may interfere with results    Ketones, UA 02/04/2020 SEE COMMENT  Negative Final    Color may interfere with results    Bilirubin (UA) 02/04/2020 SEE COMMENT  Negative Final    Color may interfere with results    Occult Blood UA 02/04/2020 SEE COMMENT  Negative Final    Color may interfere with results    Nitrite, UA 02/04/2020 SEE COMMENT  Negative Final     Color may interfere with results    Leukocytes, UA 02/04/2020 SEE COMMENT  Negative Final    Color may interfere with results    RBC, UA 02/04/2020 3  0 - 4 /hpf Final    WBC, UA 02/04/2020 20* 0 - 5 /hpf Final    Bacteria 02/04/2020 Few* None-Occ /hpf Final    Squam Epithel, UA 02/04/2020 4  /hpf Final    Amorphous, UA 02/04/2020 Moderate  None-Moderate Final    Microscopic Comment 02/04/2020 SEE COMMENT   Final    Microscopic done on unspun urine due to small specimen size.   Lab Visit on 01/30/2020   Component Date Value Ref Range Status    Microalbum.,U,Random 01/30/2020 17.0  ug/mL Final    Creatinine, Random Ur 01/30/2020 218.0  15.0 - 325.0 mg/dL Final    Comment: The random urine reference ranges provided were established   for 24 hour urine collections.  No reference ranges exist for  random urine specimens.  Correlate clinically.      Microalb Creat Ratio 01/30/2020 7.8  0.0 - 30.0 ug/mg Final   Lab Visit on 01/30/2020   Component Date Value Ref Range Status    Sodium 01/30/2020 140  136 - 145 mmol/L Final    Potassium 01/30/2020 4.2  3.5 - 5.1 mmol/L Final    Chloride 01/30/2020 101  95 - 110 mmol/L Final    CO2 01/30/2020 29  23 - 29 mmol/L Final    Glucose 01/30/2020 179* 70 - 110 mg/dL Final    BUN, Bld 01/30/2020 14  8 - 23 mg/dL Final    Creatinine 01/30/2020 0.8  0.5 - 1.4 mg/dL Final    Calcium 01/30/2020 9.7  8.7 - 10.5 mg/dL Final    Total Protein 01/30/2020 7.2  6.0 - 8.4 g/dL Final    Albumin 01/30/2020 3.8  3.5 - 5.2 g/dL Final    Total Bilirubin 01/30/2020 0.4  0.1 - 1.0 mg/dL Final    Comment: For infants and newborns, interpretation of results should be based  on gestational age, weight and in agreement with clinical  observations.  Premature Infant recommended reference ranges:  Up to 24 hours.............<8.0 mg/dL  Up to 48 hours............<12.0 mg/dL  3-5 days..................<15.0 mg/dL  6-29 days.................<15.0 mg/dL      Alkaline Phosphatase 01/30/2020  55  55 - 135 U/L Final    AST 01/30/2020 34  10 - 40 U/L Final    ALT 01/30/2020 46* 10 - 44 U/L Final    Anion Gap 01/30/2020 10  8 - 16 mmol/L Final    eGFR if African American 01/30/2020 >60.0  >60 mL/min/1.73 m^2 Final    eGFR if non African American 01/30/2020 >60.0  >60 mL/min/1.73 m^2 Final    Comment: Calculation used to obtain the estimated glomerular filtration  rate (eGFR) is the CKD-EPI equation.       Cholesterol 01/30/2020 191  120 - 199 mg/dL Final    Comment: The National Cholesterol Education Program (NCEP) has set the  following guidelines (reference ranges) for Cholesterol:  Optimal.....................<200 mg/dL  Borderline High.............200-239 mg/dL  High........................> or = 240 mg/dL      Triglycerides 01/30/2020 265* 30 - 150 mg/dL Final    Comment: The National Cholesterol Education Program (NCEP) has set the  following guidelines (reference values) for triglycerides:  Normal......................<150 mg/dL  Borderline High.............150-199 mg/dL  High........................200-499 mg/dL      HDL 01/30/2020 41  40 - 75 mg/dL Final    Comment: The National Cholesterol Education Program (NCEP) has set the  following guidelines (reference values) for HDL Cholesterol:  Low...............<40 mg/dL  Optimal...........>60 mg/dL      LDL Cholesterol 01/30/2020 97.0  63.0 - 159.0 mg/dL Final    Comment: The National Cholesterol Education Program (NCEP) has set the  following guidelines (reference values) for LDL Cholesterol:  Optimal.......................<130 mg/dL  Borderline High...............130-159 mg/dL  High..........................160-189 mg/dL  Very High.....................>190 mg/dL      Hdl/Cholesterol Ratio 01/30/2020 21.5  20.0 - 50.0 % Final    Total Cholesterol/HDL Ratio 01/30/2020 4.7  2.0 - 5.0 Final    Non-HDL Cholesterol 01/30/2020 150  mg/dL Final    Comment: Risk category and Non-HDL cholesterol goals:  Coronary heart disease (CHD)or equivalent  (10-year risk of CHD >20%):  Non-HDL cholesterol goal     <130 mg/dL  Two or more CHD risk factors and 10-year risk of CHD <= 20%:  Non-HDL cholesterol goal     <160 mg/dL  0 to 1 CHD risk factor:  Non-HDL cholesterol goal     <190 mg/dL      Hemoglobin A1C 01/30/2020 8.0* 4.0 - 5.6 % Final    Comment: ADA Screening Guidelines:  5.7-6.4%  Consistent with prediabetes  >or=6.5%  Consistent with diabetes  High levels of fetal hemoglobin interfere with the HbA1C  assay. Heterozygous hemoglobin variants (HbS, HgC, etc)do  not significantly interfere with this assay.   However, presence of multiple variants may affect accuracy.      Estimated Avg Glucose 01/30/2020 183* 68 - 131 mg/dL Final    WBC 01/30/2020 6.38  3.90 - 12.70 K/uL Final    RBC 01/30/2020 4.66  4.00 - 5.40 M/uL Final    Hemoglobin 01/30/2020 14.5  12.0 - 16.0 g/dL Final    Hematocrit 01/30/2020 45.6  37.0 - 48.5 % Final    Mean Corpuscular Volume 01/30/2020 98  82 - 98 fL Final    Mean Corpuscular Hemoglobin 01/30/2020 31.1* 27.0 - 31.0 pg Final    Mean Corpuscular Hemoglobin Conc 01/30/2020 31.8* 32.0 - 36.0 g/dL Final    RDW 01/30/2020 12.9  11.5 - 14.5 % Final    Platelets 01/30/2020 190  150 - 350 K/uL Final    MPV 01/30/2020 10.5  9.2 - 12.9 fL Final    Immature Granulocytes 01/30/2020 0.3  0.0 - 0.5 % Final    Gran # (ANC) 01/30/2020 3.4  1.8 - 7.7 K/uL Final    Immature Grans (Abs) 01/30/2020 0.02  0.00 - 0.04 K/uL Final    Comment: Mild elevation in immature granulocytes is non specific and   can be seen in a variety of conditions including stress response,   acute inflammation, trauma and pregnancy. Correlation with other   laboratory and clinical findings is essential.      Lymph # 01/30/2020 2.2  1.0 - 4.8 K/uL Final    Mono # 01/30/2020 0.5  0.3 - 1.0 K/uL Final    Eos # 01/30/2020 0.3  0.0 - 0.5 K/uL Final    Baso # 01/30/2020 0.05  0.00 - 0.20 K/uL Final    nRBC 01/30/2020 0  0 /100 WBC Final    Gran% 01/30/2020 53.0   38.0 - 73.0 % Final    Lymph% 01/30/2020 33.7  18.0 - 48.0 % Final    Mono% 01/30/2020 7.5  4.0 - 15.0 % Final    Eosinophil% 01/30/2020 4.7  0.0 - 8.0 % Final    Basophil% 01/30/2020 0.8  0.0 - 1.9 % Final    Differential Method 01/30/2020 Automated   Final     No results found in the last 24 hours.   Assessment:       ICD-10-CM ICD-9-CM   1. Type 2 diabetes mellitus with hyperglycemia, without long-term current use of insulin E11.65 250.00     790.29   2. Hypertension associated with diabetes E11.59 250.80    I10 401.9   3. Overweight (BMI 25.0-29.9) E66.3 278.02   4. Type 2 diabetes mellitus with hypertriglyceridemia E11.69 250.80    E78.1 272.1   5. Encounter for long-term (current) use of medications  Z79.899 V58.69       Plan:     #NIDDM2  -ha1c peak 10.9% in 9/2016 --> 7.3% 7/2019 --> 8% 1/30/20  -Eye exam 4/1/19. Macular Degeneration. No retinopathy. Dr. Jacobs.  -1/30/20: cr 0.8, GFR >60, LDL 97, tri 265, micro/cr ratio 7.8  -Continue crestor 5 mg daily (lipitor 10 caused myalgias, didn't take simvastatin) & fish oil BID  -metformin caused GI upset. Stopped glipizide 2.5 mg daily at last visit.  -Continue  januvia 100 mg daily (was on in past and limited by insurance). -start jardiance 10 mg daily - advised to reach out if cost issue  -low carb diet!!    #HTN  -/56, HR 60. Suspect some orthostasis. (initial visit)  -Stopped bisoprolol/hctz 10/6.25 mg daily. Started losartan 25 mg daily.   -150/66 today. Home readings elevated. Increase to losartan 50 mg daily.  -low salt diet    #Hypertriglyceridemia  -1/30/20: chol 191, hdl 41, ldl 97, tri 265  -advised DM goal LDL 70, tri <135.  -continue fish oil and crestor as above     #Overweight- BMI 26.04  -noted. Discussed healthy diet with carb restriction.     #Elevated ALT  -1/30/20: ALT 46. Normal in past. Discussed likely due to triglycerides  -discussed healthy diet. Repeat on next labs.     #HCM  -Influenza 9/25/19  -TDAP & shingrix deferred due  to cost  -Prevnar 12/7/15. Pneumovax 8/6/18  -Mammogram no longer indicated >76 y/o  -DEXA 2/13/20: normal. Repeat in 2 years.  -Colonoscopy deferred given age.   -1/30/20: cbc normal     Has mychart. Labs 6/12/20. F/up 1 week later    Mary Ann Gerard M.D.    Orders Placed This Encounter   Procedures    Hemoglobin A1c    Lipid panel    Comprehensive metabolic panel    TSH    Vitamin D      Medications Ordered This Encounter   Medications    empagliflozin (JARDIANCE) 10 mg tablet     Sig: Take 1 tablet (10 mg total) by mouth once daily.     Dispense:  30 tablet     Refill:  0    losartan (COZAAR) 50 MG tablet     Sig: Take 1 tablet (50 mg total) by mouth once daily.     Dispense:  30 tablet     Refill:  0

## 2020-03-13 ENCOUNTER — TELEPHONE (OUTPATIENT)
Dept: FAMILY MEDICINE | Facility: CLINIC | Age: 75
End: 2020-03-13

## 2020-03-13 NOTE — TELEPHONE ENCOUNTER
----- Message from Jeremiah Souza sent at 3/13/2020  1:15 PM CDT -----  Contact: self [phone]  Pt would like return call regarding Jardiance medication; pt states she is experiencing some side effects. Please call back at [phone].  xMd

## 2020-03-13 NOTE — TELEPHONE ENCOUNTER
Spoke with patient took her first Jardiance pill today at 10:00 am and then about 12:00 she started feeling funny, got dizzy. BS at 10 was 160 and at noon it had dropped to 140.  Patient states she feels great now, wants to know if she should continue to take the Jardiance or ok to take it at night? Please advise

## 2020-03-16 NOTE — TELEPHONE ENCOUNTER
Patient advised of it being ok to take jardiance at night. She reports that the episode lasted about 2 hours and then it passed. She has taken it during the day for the past few days and has had no issue.

## 2020-03-26 ENCOUNTER — CLINICAL SUPPORT (OUTPATIENT)
Dept: FAMILY MEDICINE | Facility: CLINIC | Age: 75
End: 2020-03-26
Payer: MEDICARE

## 2020-03-26 ENCOUNTER — OFFICE VISIT (OUTPATIENT)
Dept: FAMILY MEDICINE | Facility: CLINIC | Age: 75
End: 2020-03-26
Payer: MEDICARE

## 2020-03-26 ENCOUNTER — NURSE TRIAGE (OUTPATIENT)
Dept: ADMINISTRATIVE | Facility: CLINIC | Age: 75
End: 2020-03-26

## 2020-03-26 VITALS
HEIGHT: 61 IN | HEART RATE: 110 BPM | DIASTOLIC BLOOD PRESSURE: 76 MMHG | WEIGHT: 135.19 LBS | BODY MASS INDEX: 25.52 KG/M2 | OXYGEN SATURATION: 89 % | TEMPERATURE: 98 F | SYSTOLIC BLOOD PRESSURE: 120 MMHG | RESPIRATION RATE: 18 BRPM

## 2020-03-26 DIAGNOSIS — I15.2 HYPERTENSION ASSOCIATED WITH DIABETES: Chronic | ICD-10-CM

## 2020-03-26 DIAGNOSIS — R09.02 HYPOXIA: ICD-10-CM

## 2020-03-26 DIAGNOSIS — I44.7 LEFT BUNDLE BRANCH BLOCK (LBBB): ICD-10-CM

## 2020-03-26 DIAGNOSIS — E11.59 HYPERTENSION ASSOCIATED WITH DIABETES: Chronic | ICD-10-CM

## 2020-03-26 DIAGNOSIS — R00.0 TACHYCARDIA: ICD-10-CM

## 2020-03-26 DIAGNOSIS — R61 DIAPHORESIS: ICD-10-CM

## 2020-03-26 DIAGNOSIS — E11.69 TYPE 2 DIABETES MELLITUS WITH HYPERTRIGLYCERIDEMIA: Chronic | ICD-10-CM

## 2020-03-26 DIAGNOSIS — Z20.822 EXPOSURE TO 2019 NOVEL CORONAVIRUS: ICD-10-CM

## 2020-03-26 DIAGNOSIS — R07.89 CHEST TIGHTNESS: ICD-10-CM

## 2020-03-26 DIAGNOSIS — E66.3 OVERWEIGHT (BMI 25.0-29.9): ICD-10-CM

## 2020-03-26 DIAGNOSIS — R06.03 RESPIRATORY DISTRESS: Primary | ICD-10-CM

## 2020-03-26 DIAGNOSIS — E78.1 TYPE 2 DIABETES MELLITUS WITH HYPERTRIGLYCERIDEMIA: Chronic | ICD-10-CM

## 2020-03-26 DIAGNOSIS — E11.65 TYPE 2 DIABETES MELLITUS WITH HYPERGLYCEMIA, WITHOUT LONG-TERM CURRENT USE OF INSULIN: Chronic | ICD-10-CM

## 2020-03-26 PROCEDURE — 93005 ELECTROCARDIOGRAM TRACING: CPT | Mod: HCNC,S$GLB,, | Performed by: INTERNAL MEDICINE

## 2020-03-26 PROCEDURE — 93010 EKG 12-LEAD: ICD-10-PCS | Mod: HCNC,S$GLB,, | Performed by: INTERNAL MEDICINE

## 2020-03-26 PROCEDURE — 99215 PR OFFICE/OUTPT VISIT, EST, LEVL V, 40-54 MIN: ICD-10-PCS | Mod: HCNC,S$GLB,, | Performed by: INTERNAL MEDICINE

## 2020-03-26 PROCEDURE — 93005 EKG 12-LEAD: ICD-10-PCS | Mod: HCNC,S$GLB,, | Performed by: INTERNAL MEDICINE

## 2020-03-26 PROCEDURE — 93010 ELECTROCARDIOGRAM REPORT: CPT | Mod: HCNC,S$GLB,, | Performed by: INTERNAL MEDICINE

## 2020-03-26 PROCEDURE — 3052F HG A1C>EQUAL 8.0%<EQUAL 9.0%: CPT | Mod: HCNC,CPTII,S$GLB, | Performed by: INTERNAL MEDICINE

## 2020-03-26 PROCEDURE — 3052F PR MOST RECENT HEMOGLOBIN A1C LEVEL 8.0 - < 9.0%: ICD-10-PCS | Mod: HCNC,CPTII,S$GLB, | Performed by: INTERNAL MEDICINE

## 2020-03-26 PROCEDURE — 99999 PR PBB SHADOW E&M-EST. PATIENT-LVL III: CPT | Mod: PBBFAC,HCNC,, | Performed by: INTERNAL MEDICINE

## 2020-03-26 PROCEDURE — 99999 PR PBB SHADOW E&M-EST. PATIENT-LVL III: ICD-10-PCS | Mod: PBBFAC,HCNC,, | Performed by: INTERNAL MEDICINE

## 2020-03-26 PROCEDURE — 99215 OFFICE O/P EST HI 40 MIN: CPT | Mod: HCNC,S$GLB,, | Performed by: INTERNAL MEDICINE
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
## 2020-03-26 NOTE — TELEPHONE ENCOUNTER
We need to get her evaluated. Make sure her sats are stable. I can eval here in clinic. Telemed likely not possible due to need for sat level and ambulatory sat. May need pulse ox. appt here in clinic please

## 2020-04-01 ENCOUNTER — TELEPHONE (OUTPATIENT)
Dept: FAMILY MEDICINE | Facility: CLINIC | Age: 75
End: 2020-04-01

## 2020-04-01 DIAGNOSIS — I50.9 ACUTE ON CHRONIC CONGESTIVE HEART FAILURE, UNSPECIFIED HEART FAILURE TYPE: Primary | ICD-10-CM

## 2020-04-01 NOTE — TELEPHONE ENCOUNTER
----- Message from Sarika Moncada sent at 4/1/2020 12:15 PM CDT -----  Type:  Needs Medical Advice    Who Called:   Mario with [facility]  Symptoms (please be specific):   Calling regarding home health for pt   How long has patient had these symptoms:     Pharmacy name and phone #:     Would the patient rather a call back or a response via [facility]?    Call back  Best Call Back Number:    [phone]  Additional Information:  Please call to discuss//mary/kip

## 2020-04-01 NOTE — TELEPHONE ENCOUNTER
Spoke to home health company they are sending message for home health nurse to give us a call back regarding patient.

## 2020-04-01 NOTE — TELEPHONE ENCOUNTER
I have signed for the following orders AND/OR meds.  Please call the patient and ask the patient to schedule the testing AND/OR inform about any medications that were sent.      Orders Placed This Encounter   Procedures    Ambulatory referral/consult to Home Health     Standing Status:   Future     Standing Expiration Date:   5/1/2021     Referral Priority:   Routine     Referral Type:   Home Health     Referral Reason:   Specialty Services Required     Requested Specialty:   Home Health Services     Number of Visits Requested:   1

## 2020-04-01 NOTE — TELEPHONE ENCOUNTER
Yes unless that the patient needs home oxygen and does not have it yet please call the patient or her family to find out the case.

## 2020-04-01 NOTE — TELEPHONE ENCOUNTER
----- Message from Jeremiah Souza sent at 4/1/2020  4:08 PM CDT -----  Contact: [facility]- [phone]  Would like to consult with nurse regarding referral received for patient.; would like to know if patient can be admitted on 04/03/2020.   Please call back at [phone].   SHYAM Stephenson:d

## 2020-04-01 NOTE — TELEPHONE ENCOUNTER
Spoke to Mario with [facility] case management.  It was recommended for patient to consider home health/nurse PT & OT after hospital discharge over the weekend.    Patient's daughter is requesting home health referral.

## 2020-04-01 NOTE — TELEPHONE ENCOUNTER
----- Message from Que Patel sent at 4/1/2020  2:19 PM CDT -----  Contact: [place]  Caller is calling to previous conversation regarding getting pt home health. Caller previous spoke with MsDavid Linda. Caller can be reached at [phone]

## 2020-04-02 NOTE — TELEPHONE ENCOUNTER
Returned call to Cecile and she says this has been turned over to [facility] and they have already admitted her. Nothing further needed at this time.

## 2020-04-03 ENCOUNTER — TELEPHONE (OUTPATIENT)
Dept: VASCULAR SURGERY | Facility: CLINIC | Age: 75
End: 2020-04-03

## 2020-04-03 PROCEDURE — G0180 PR HOME HEALTH MD CERTIFICATION: ICD-10-PCS | Mod: ,,, | Performed by: FAMILY MEDICINE

## 2020-04-03 PROCEDURE — G0180 MD CERTIFICATION HHA PATIENT: HCPCS | Mod: ,,, | Performed by: FAMILY MEDICINE

## 2020-04-03 NOTE — TELEPHONE ENCOUNTER
----- Message from Chava Neville sent at 4/3/2020  1:56 PM CDT -----  Contact: pt daughter in law Clarisa  Type:  Sooner Apoointment Request    Caller is requesting a sooner appointment.  Caller declined first available appointment listed below.  Caller will not accept being placed on the waitlist and is requesting a message be sent to doctor.    Name of Caller:  Clarisa    Best Call Back Number:  [phone]  Additional Information:  Pt is to have virtual visit to be evaluated for by pass surgery on Monday but has not yet been called with the time of the visit. Assisted with getting set up on [employer]

## 2020-04-06 NOTE — TELEPHONE ENCOUNTER
----- Message from Alcira Martinez sent at 4/6/2020 10:10 AM CDT -----  Contact: Dinora Farias from NP Teddy Shelton called to check on referral sent over.    Please call at: [phone]

## 2020-04-07 ENCOUNTER — OFFICE VISIT (OUTPATIENT)
Dept: VASCULAR SURGERY | Facility: CLINIC | Age: 75
End: 2020-04-07
Payer: MEDICARE

## 2020-04-07 ENCOUNTER — PATIENT MESSAGE (OUTPATIENT)
Dept: VASCULAR SURGERY | Facility: CLINIC | Age: 75
End: 2020-04-07

## 2020-04-07 ENCOUNTER — PATIENT OUTREACH (OUTPATIENT)
Dept: ADMINISTRATIVE | Facility: OTHER | Age: 75
End: 2020-04-07

## 2020-04-07 DIAGNOSIS — E11.59 HYPERTENSION ASSOCIATED WITH DIABETES: Chronic | ICD-10-CM

## 2020-04-07 DIAGNOSIS — I25.10 CORONARY ARTERY DISEASE INVOLVING NATIVE CORONARY ARTERY OF NATIVE HEART WITHOUT ANGINA PECTORIS: ICD-10-CM

## 2020-04-07 DIAGNOSIS — E11.65 TYPE 2 DIABETES MELLITUS WITH HYPERGLYCEMIA, WITHOUT LONG-TERM CURRENT USE OF INSULIN: Primary | Chronic | ICD-10-CM

## 2020-04-07 DIAGNOSIS — I25.5 ISCHEMIC CARDIOMYOPATHY: ICD-10-CM

## 2020-04-07 DIAGNOSIS — I15.2 HYPERTENSION ASSOCIATED WITH DIABETES: Chronic | ICD-10-CM

## 2020-04-07 PROCEDURE — 3052F PR MOST RECENT HEMOGLOBIN A1C LEVEL 8.0 - < 9.0%: ICD-10-PCS | Mod: HCNC,CPTII,95, | Performed by: THORACIC SURGERY (CARDIOTHORACIC VASCULAR SURGERY)

## 2020-04-07 PROCEDURE — 99499 RISK ADDL DX/OHS AUDIT: ICD-10-PCS | Mod: HCNC,95,, | Performed by: THORACIC SURGERY (CARDIOTHORACIC VASCULAR SURGERY)

## 2020-04-07 PROCEDURE — 1159F PR MEDICATION LIST DOCUMENTED IN MEDICAL RECORD: ICD-10-PCS | Mod: HCNC,95,, | Performed by: THORACIC SURGERY (CARDIOTHORACIC VASCULAR SURGERY)

## 2020-04-07 PROCEDURE — 99203 OFFICE O/P NEW LOW 30 MIN: CPT | Mod: HCNC,95,, | Performed by: THORACIC SURGERY (CARDIOTHORACIC VASCULAR SURGERY)

## 2020-04-07 PROCEDURE — 1101F PT FALLS ASSESS-DOCD LE1/YR: CPT | Mod: HCNC,CPTII,95, | Performed by: THORACIC SURGERY (CARDIOTHORACIC VASCULAR SURGERY)

## 2020-04-07 PROCEDURE — 3052F HG A1C>EQUAL 8.0%<EQUAL 9.0%: CPT | Mod: HCNC,CPTII,95, | Performed by: THORACIC SURGERY (CARDIOTHORACIC VASCULAR SURGERY)

## 2020-04-07 PROCEDURE — 1159F MED LIST DOCD IN RCRD: CPT | Mod: HCNC,95,, | Performed by: THORACIC SURGERY (CARDIOTHORACIC VASCULAR SURGERY)

## 2020-04-07 PROCEDURE — 99499 UNLISTED E&M SERVICE: CPT | Mod: HCNC,95,, | Performed by: THORACIC SURGERY (CARDIOTHORACIC VASCULAR SURGERY)

## 2020-04-07 PROCEDURE — 1101F PR PT FALLS ASSESS DOC 0-1 FALLS W/OUT INJ PAST YR: ICD-10-PCS | Mod: HCNC,CPTII,95, | Performed by: THORACIC SURGERY (CARDIOTHORACIC VASCULAR SURGERY)

## 2020-04-07 PROCEDURE — 99203 PR OFFICE/OUTPT VISIT, NEW, LEVL III, 30-44 MIN: ICD-10-PCS | Mod: HCNC,95,, | Performed by: THORACIC SURGERY (CARDIOTHORACIC VASCULAR SURGERY)

## 2020-04-07 NOTE — PROGRESS NOTES
The patient location is:  home  The chief complaint leading to consultation is:  Evaluation of coronary artery disease  Visit type: Virtual visit with synchronous audio and video  Total time spent with patient:  30 min  Each patient to whom he or she provides medical services by telemedicine is:  (1) informed of the relationship between the physician and patient and the respective role of any other health care provider with respect to management of the patient; and (2) notified that he or she may decline to receive medical services by telemedicine and may withdraw from such care at any time.      CHIEF COMPLAINT:  Coronary artery disease      REFERRING PROVIDER: Jesús Viera MD    Reason for consult:  Evaluation of coronary artery disease    History of Present Illness     Patient is a 75 y.o. female who was admitted to [facility] on March 26, 2020 with significant shortness of breath.  She was found to have acute systolic heart failure and acute respiratory failure which required intubation and mechanical ventilation.  She underwent thoracentesis and further treatment for depressed left ventricular ejection fraction of 35%.  With medical management, she was weaned from mechanical ventilation, and she was able to be discharged home.  She returned electively for cardiac catheterization which confirms multivessel coronary artery disease and a significantly depressed left ventricular systolic function.    Patient Active Problem List    Diagnosis Date Noted    Overweight (BMI 25.0-29.9) 02/11/2020    Type 2 diabetes mellitus with hypertriglyceridemia 07/12/2018    Nonexudative age-related macular degeneration, bilateral, intermediate dry stage 03/19/2018    Retinitis pigmentosa, both eyes 03/19/2018    NS (nuclear sclerosis), bilateral 03/19/2018    Hypertension associated with diabetes 12/08/2015    Type 2 diabetes mellitus with hyperglycemia, without long-term current use of insulin 10/24/2015      Past Medical History:   Diagnosis Date    Cataract     OU    Coronary artery disease     Hypertension associated with diabetes 12/8/2015    SCC (squamous cell carcinoma) EXCISED  VIA MOHS    MID UPPER FOREHEAD    Squamous Cell Carcinoma     right scalp and mid nasal bridge    Type 2 diabetes mellitus with hyperglycemia, without long-term current use of insulin 10/24/2015      Past Surgical History:   Procedure Laterality Date    CARDIAC CATHETERIZATION  04/02/2020    [place]:  Dr Viera    HYSTERECTOMY      SKIN BIOPSY        Medication List with Changes/Refills   Current Medications    ALBUTEROL 90 MCG/ACTUATION INHALER    Inhale 2 puffs into the lungs every 6 (six) hours as needed for Wheezing. Rescue    BLOOD SUGAR DIAGNOSTIC STRP    TEST BLOOD SUGAR ONCE DAILY    EMPAGLIFLOZIN (JARDIANCE) 10 MG TABLET    Take 1 tablet (10 mg total) by mouth once daily.    ESTRADIOL (VIVELLE-DOT) 0.1 MG/24 HR PTSW    APPLY 1 PATCH ONTO SKIN TWO TIMES A WEEK    FLUTICASONE (FLONASE) 50 MCG/ACTUATION NASAL SPRAY    2 sprays (100 mcg total) by Each Nare route once daily.    LOSARTAN (COZAAR) 50 MG TABLET    Take 1 tablet (50 mg total) by mouth once daily.    OMEGA 3-DHA-EPA-FISH OIL (FISH OIL) 138-183-1,000 MG CAP    Take 1 capsule by mouth 2 (two) times daily.    ROSUVASTATIN (CRESTOR) 5 MG TABLET    Take 1 tablet (5 mg total) by mouth once daily.    SITAGLIPTIN (JANUVIA) 100 MG TAB    Take 1 tablet (100 mg total) by mouth once daily.     Review of patient's allergies indicates:  No Known Allergies   Social History     Tobacco Use    Smoking status: Never Smoker    Smokeless tobacco: Never Used   Substance Use Topics    Alcohol use: No      Family History   Problem Relation Age of Onset    No Known Problems Mother     No Known Problems Father     Skin cancer Neg Hx     Melanoma Neg Hx       Review of Systems  General:  No fevers, chills, weight gain.  She has mild fatigue.  HEENT:  No new visual field  changes or hoarseness.  Neck:  No complaints.  Respiratory:  Some shortness of breath with activities.  This is not worsening.  Cardiac:  No angina or palpitations.  Extremities:  No dependent edema.  Neurologic:  No complaints.    Objective: Physical Exam     There were no vitals filed for this visit.  Patient reports following vitals from this morning:  Blood pressure 105/62  Heart rate 76  Weight 122 lb (weight 121 lb yesterday.)    Objective    Full physical exam not possible as patient is on video.    Data Review:   Lab Results   Component Value Date    WBC 6.38 01/30/2020    HGB 14.5 01/30/2020    HCT 45.6 01/30/2020    MCV 98 01/30/2020     01/30/2020   ,   BMP   Lab Results   Component Value Date     01/30/2020    K 4.2 01/30/2020     01/30/2020    CO2 29 01/30/2020    BUN 14 01/30/2020    CREATININE 0.8 01/30/2020    CALCIUM 9.7 01/30/2020    ANIONGAP 10 01/30/2020    ESTGFRAFRICA >60.0 01/30/2020    EGFRNONAA >60.0 01/30/2020   ,   CMP  Sodium   Date Value Ref Range Status   01/30/2020 140 136 - 145 mmol/L Final     Potassium   Date Value Ref Range Status   01/30/2020 4.2 3.5 - 5.1 mmol/L Final     Chloride   Date Value Ref Range Status   01/30/2020 101 95 - 110 mmol/L Final     CO2   Date Value Ref Range Status   01/30/2020 29 23 - 29 mmol/L Final     Glucose   Date Value Ref Range Status   01/30/2020 179 (H) 70 - 110 mg/dL Final     BUN, Bld   Date Value Ref Range Status   01/30/2020 14 8 - 23 mg/dL Final     Creatinine   Date Value Ref Range Status   01/30/2020 0.8 0.5 - 1.4 mg/dL Final     Calcium   Date Value Ref Range Status   01/30/2020 9.7 8.7 - 10.5 mg/dL Final     Total Protein   Date Value Ref Range Status   01/30/2020 7.2 6.0 - 8.4 g/dL Final     Albumin   Date Value Ref Range Status   01/30/2020 3.8 3.5 - 5.2 g/dL Final     Total Bilirubin   Date Value Ref Range Status   01/30/2020 0.4 0.1 - 1.0 mg/dL Final     Comment:     For infants and newborns, interpretation of results  should be based  on gestational age, weight and in agreement with clinical  observations.  Premature Infant recommended reference ranges:  Up to 24 hours.............<8.0 mg/dL  Up to 48 hours............<12.0 mg/dL  3-5 days..................<15.0 mg/dL  6-29 days.................<15.0 mg/dL       Alkaline Phosphatase   Date Value Ref Range Status   2020 55 55 - 135 U/L Final     AST   Date Value Ref Range Status   2020 34 10 - 40 U/L Final     ALT   Date Value Ref Range Status   2020 46 (H) 10 - 44 U/L Final     Anion Gap   Date Value Ref Range Status   2020 10 8 - 16 mmol/L Final     eGFR if    Date Value Ref Range Status   2020 >60.0 >60 mL/min/1.73 m^2 Final     eGFR if non    Date Value Ref Range Status   2020 >60.0 >60 mL/min/1.73 m^2 Final     Comment:     Calculation used to obtain the estimated glomerular filtration  rate (eGFR) is the CKD-EPI equation.      ,   No results found for: INR, PROTIME    Echocardiogram  Adult Echocardiogram  Name: DEBRA MAHARAJ           Study Date: 2020  MRN: [id number]                     Age: 75 yrs  Patient Location: [id number]                               Height: 61 in  : 1945                                                 Weight: 138 lb  Gender: Female                                                  BSA: 1.6 m2  Reason For Study: Volume overload, ?CHF. elevated troponin  BP: 110/61 mmHg    Ordering Physician: CHADWICK GONG  Performed By: JC Tracey    Interpretation Summary  Ejection Fraction = 30-35%.  R/O covid pt. for LV function.  Distal anteroseptal, anterior and apical hypokinesis    Measurements with Normals  IVSd: 1.4 cm         (0.6-1.1 cm)       LVIDd: 4.4 cm      (3.7-5.6 cm)  LVPWd: 1.3 cm        (0.6-1.1 cm)       LVIDs: 3.5 cm      (2.3-3.9 cm)  Ao root diam: 3.1 cm (2.0-3.7 cm)  LA dimension: 3.5 cm (1.9-4.0 cm)    MMode/2D Measurements & Calculations  FS:  20.9 %                           Ao root area: 7.3 cm2  EDV(Teich): 87.3 ml  ESV(Teich): 49.9 ml  EF(Teich): 42.8 %    LEFT VENTRICLE       o The left ventricle is normal in size.     o Ejection Fraction = 30-35%.     o There is moderate concentric left ventricular hypertrophy.     o There is moderate to severe apical wall hypokinesis.     o Distal anteroseptal, anterior and apical hypokinesis.    RIGHT VENTRICLE       o The right ventricle is normal in size and function.    ATRIA       o The left atrial size is normal.     o Right Atrium Grossly Normal.    MITRAL VALVE       o The mitral valve is normal.    TRICUSPID VALVE       o The tricuspid valve is not well visualized, but is grossly normal.    AORTIC VALVE       o Aortic Valve Grossly Normal.    PULMONIC VALVE       o The pulmonic valve is not well visualized.    PERICARDIUM/PLEURAL EFFUSION       o There is no pericardial effusion.    ______________________________________________________________________________  Electronically signed by: Jesús Viera MD 03/27/2020 05:08 PM  InterpretingPhysician:  Jesús Viera MD electronically signed on 2020-03-27 17:08:46.487      CT Scan:   CT Angiogram PE Protocol W Contrast [[id number]] Collected: 03/26/2020 1816   Updated: 03/26/2020 1831   Narrative:   REASON FOR EXAM: Shortness of breath     TECHNICAL FACTORS: Multiple contiguous axial CT images were obtained of the chest after the administration of intravenous contrast. ASIR was utilized for radiation reduction. 2-D sagittal and coronal reformatted images were performed. 3-D MIP images were   also obtained with postprocessing performed without the use of an independent workstation under concurrent radiologist supervision.    COMPARISON: None    FINDINGS:   Soft tissues/Musculature: Unremarkable  Osseous Structures:There is exaggerated thoracic kyphosis. There is moderate multilevel spondylosis.  Thyroid: Unremarkable  Esophagus: Enteric tube tip terminates in the  gastric body.  Upper Abdomen: Splenic calcifications suggest old granulomatous disease. Colonic diverticula are visualized, without evidence of diverticulitis.  Aorta and Great Vessels: Moderate atherosclerotic calcification.  Heart: Coronary artery calcification. Mild cardiomegaly.  Mediastinum:No mediastinal lymphadenopathy. Mediastinal liz calcifications suggest old granulomatous disease.  Lungs: No evidence of pulmonary embolus. Endotracheal tube tip terminates in the gena. Small tracheal diverticulum is present. There is interlobular septal thickening. There is soft tissue thickening along the bronchovascular bundles. There are hazy   groundglass opacities throughout the lungs. There is bilateral lower lobe atelectasis. Scattered pulmonary nodules, for example in the right middle lobe (2-66, 2-62) and left upper lobe (2-62), measuring up to 7 mm.  Pleura: Small to moderate bilateral pleural effusions are present.    IMPRESSION:  1. No evidence of pulmonary embolus.  2. Interlobular septal thickening, moderate bilateral pleural effusions, and hazy diffuse groundglass opacities throughout the bilateral lungs in the setting of cardiomegaly are most consistent with pulmonary edema/volume overload. Imaging features are   atypical or uncommonly reported for COVID19 pneumonia.   3. Endotracheal tube and enteric tube in place.  4. There are scattered pulmonary nodules within the right middle lobe and lingula. Fleischner Society 2017 guidelines for management of incidentally detected solid pulmonary nodules in adults (does not apply to patients younger than 35 years, CT lung   cancer screening, patients with immunosuppression or patients with known primary cancer): Multiple solid nodules 6-8 mm: For low risk patients CT at 3-6 months then consider CT at 18-24 months. For high risk patients CT at 3-6 months then at 18-24   months.        Electronically signed by Lindsay Black MD on 3/26/2020 6:28 PM       CARDIAC  CATH:  04/02/2020  [facility]     PROCEDURES:       1. Left heart catheterization with bilateral selective coronary   angiography and left ventricular angiography via the right radial   approach.     2. Administration of conscious sedation with direct face-to-face   observation of level of sedation personally supervised by me with a start   time of 8:40 and an end time of 9:20 a.m.    PROCEDURE IN DETAIL:  The patient was taken to the cardiac catheterization   lab and placed on nasal cannula oxygen.  Blood pressure, heart rate, EKG,   and heart rhythm were monitored continuously during the procedure.  She   was given incremental doses of   Versed and fentanyl to achieve conscious sedation.  I personally   supervised level of sedation, vital signs and telemetry during the   entirety of the case.  After confirming dual arterial circulation to the   hand, the right wrist was prepped and draped in   the usual sterile fashion.  Lidocaine was given.  Using a micropuncture   needle, we cannulated the right radial artery and placed a 5/6 slender   sheath within.  We then advanced a Tiger catheter over a 0.035 J-wire.    This cannulated the left main.    Angiography of the left coronary artery was performed in orthogonal views.    This catheter was then used to selectively cannulate the right coronary   artery.  Right coronary artery angiography was then performed as well.  We   then removed this catheter over   the wire and advanced a 6-Turkish angled pigtail catheter.  This crossed   the aortic valve without difficulty.  Hemodynamics in the left ventricle   were then obtained.  Next, LV cineangiography was performed in HER   projection.  This catheter was then   withdrawn across the aortic valve.  Hemodynamics were again recorded.  The   catheter was then removed over the wire.  The radial sheath was flushed.    The sheath was removed.  A TR band applied with good hemostasis.  She   tolerated the procedure  well   without complications.  I did have a discussion with the patient and she   confirmed that she was having no symptoms of chest pain or shortness of   breath at home.  I also had a conversation with Dr. Escamilla,   cardiovascular surgeon.    RESULTS - HEMODYNAMIC DATA:  The aortic pressure was 105/53 with a mean of   71 and the left ventricular pressure of 104/18.    ANGIOGRAPHIC DATA:  The left ventricle appears dilated.  The anteroapical   and inferoapical walls appear frankly dyskinetic.  Estimated ejection   fraction was on the order of 20%.    CORONARY ANATOMY:  Left main coronary artery appears normal.  The LAD has   an ostial 60% to 70% stenosis.  The remainder of the LAD appears small   with some calcium.  There is an 80% to 85% stenosis in the midportion of   the vessel after 2 small diagonal   branches.  After a diagonal #3, there is an 85% distal stenosis.  The   remainder of the distal LAD appears fairly intact.  Diagonal #3 is a   bifurcating vessel, the largest of the 3 diagonals and one limb has an 85%   stenosis at its origin.  The circumflex   has an angulated origin off of the left main and the proximal vessel has a   95% stenosis, which is a bifurcation lesion involving the origin of an   obtuse marginal branch, which also has a 95% ostial lesion as well as the   mid circumflex also a 95% stenosis   here.  The mid circumflex appears normal and there is a smaller OM, which   appears normal.  The right coronary artery is a dominant vessel and there   is a 98% long stenosis in the midportion of the vessel.    CONCLUSION:       1. Ischemic cardiomyopathy with severely depressed LV systolic function   and regional wall motion abnormalities involving the LV apex as mentioned   above.     2. Severe 3-vessel coronary disease involving the ostium, the mid and   the distal LAD, one branch of the diagonal.     3. A bifurcating lesion involving the proximal and mid circumflex as   well as an OM and the  mid right coronary artery, which is dominant.     4. Normal hemodynamics without a gradient across the aortic valve and   without elevated left ventricular end-diastolic pressure.      Jesús Viera MD  V# [id number]  D# [id number]  D:  bme/WT:  OPRPT/T:  man  DD:  04/02/2020 09:38 TD:  04/02/2020 09:48             Assessment:     1.  Coronary artery disease native arteries native heart without angina.  2.  Ischemic cardiomyopathy related to above.  3.  Acute systolic heart failure.  4.  Essential hypertension.  5.  Type 2 diabetes.  6.  Hyperlipidemia    Plan:     Patient is receiving medical management in order to hopefully improve her cardiomyopathy to a point where surgical intervention would be more reasonable.  She is relatively asymptomatic at this time with only minimal shortness of breath, no edema, and no chest pain.  She will likely require Impella placement at the time of her coronary artery bypass surgical procedure.  I discussed the risks of surgery which are at least moderate to high risk of mortality, risk of bleeding, infection, prolonged mechanical ventilation, stroke, blood loss requiring transfusion.  Plan for intervention will be of over the next 4-8 weeks.

## 2020-04-09 ENCOUNTER — PATIENT MESSAGE (OUTPATIENT)
Dept: FAMILY MEDICINE | Facility: CLINIC | Age: 75
End: 2020-04-09

## 2020-04-09 ENCOUNTER — TELEPHONE (OUTPATIENT)
Dept: FAMILY MEDICINE | Facility: CLINIC | Age: 75
End: 2020-04-09

## 2020-04-09 DIAGNOSIS — E11.65 TYPE 2 DIABETES MELLITUS WITH HYPERGLYCEMIA, WITHOUT LONG-TERM CURRENT USE OF INSULIN: Chronic | ICD-10-CM

## 2020-04-09 NOTE — TELEPHONE ENCOUNTER
----- Message from Barbie Saeed sent at 4/9/2020  9:06 AM CDT -----  Contact: [facility]up[facility]  Rupinder is requesting call back in regards to needing HMT fax over for referral that was sent on 04/01        Phone:[phone]  Fax:[phone]

## 2020-04-20 DIAGNOSIS — I15.2 HYPERTENSION ASSOCIATED WITH DIABETES: Primary | ICD-10-CM

## 2020-04-20 DIAGNOSIS — E11.59 HYPERTENSION ASSOCIATED WITH DIABETES: Primary | ICD-10-CM

## 2020-04-20 RX ORDER — METOPROLOL SUCCINATE 25 MG/1
TABLET, EXTENDED RELEASE ORAL
COMMUNITY
Start: 2020-03-30 | End: 2020-04-20 | Stop reason: SDUPTHER

## 2020-04-20 RX ORDER — FUROSEMIDE 20 MG/1
20 TABLET ORAL DAILY
Qty: 90 TABLET | Refills: 3 | Status: SHIPPED | OUTPATIENT
Start: 2020-04-20 | End: 2021-01-19

## 2020-04-20 RX ORDER — METOPROLOL SUCCINATE 25 MG/1
25 TABLET, EXTENDED RELEASE ORAL DAILY
Qty: 90 TABLET | Refills: 3 | Status: SHIPPED | OUTPATIENT
Start: 2020-04-20 | End: 2020-08-18

## 2020-04-20 RX ORDER — FUROSEMIDE 20 MG/1
TABLET ORAL
COMMUNITY
Start: 2020-03-30 | End: 2020-04-20 | Stop reason: SDUPTHER

## 2020-04-24 ENCOUNTER — TELEPHONE (OUTPATIENT)
Dept: FAMILY MEDICINE | Facility: CLINIC | Age: 75
End: 2020-04-24

## 2020-04-24 DIAGNOSIS — E78.2 MIXED HYPERLIPIDEMIA: ICD-10-CM

## 2020-04-24 RX ORDER — ROSUVASTATIN CALCIUM 5 MG/1
5 TABLET, COATED ORAL DAILY
Qty: 90 TABLET | Refills: 0 | Status: SHIPPED | OUTPATIENT
Start: 2020-04-24 | End: 2020-05-01

## 2020-04-24 NOTE — TELEPHONE ENCOUNTER
----- Message from Antonia Rogers sent at 4/24/2020  9:45 AM CDT -----  Contact: Patient   Patient is calling regarding, rosuvastatin (CRESTOR) 5 MG tablet. Patient states she's not able to get it refilled and need someone to call her. Please call back at [phone] (home). Thanks

## 2020-04-24 NOTE — TELEPHONE ENCOUNTER
----- Message from Antonia Rogers sent at 4/24/2020  9:45 AM CDT -----  Contact: Patient   Patient is calling regarding, rosuvastatin (CRESTOR) 5 MG tablet. Patient states she's not able to get it refilled and need someone to call her. Please call back at [phone] (home). Thanks

## 2020-04-28 ENCOUNTER — DOCUMENT SCAN (OUTPATIENT)
Dept: HOME HEALTH SERVICES | Facility: HOSPITAL | Age: 75
End: 2020-04-28
Payer: MEDICARE

## 2020-05-01 RX ORDER — ROSUVASTATIN CALCIUM 20 MG/1
20 TABLET, COATED ORAL DAILY
Qty: 90 TABLET | Refills: 0 | Status: SHIPPED | OUTPATIENT
Start: 2020-05-01 | End: 2020-07-14

## 2020-05-01 NOTE — TELEPHONE ENCOUNTER
Pt states that the hospital discharged her with crestor 20 mg once a day. Pt states that she needs a refill. States our office sent over the 5 mg crestor but shes not taking that anymore.

## 2020-05-01 NOTE — TELEPHONE ENCOUNTER
----- Message from Jerri Vanessa sent at 5/1/2020  9:57 AM CDT -----  Contact: pt  Pt request call back regarding Crestor Rx, is incorrect ...[phone] (home)

## 2020-05-06 DIAGNOSIS — E11.65 TYPE 2 DIABETES MELLITUS WITH HYPERGLYCEMIA, WITHOUT LONG-TERM CURRENT USE OF INSULIN: Chronic | ICD-10-CM

## 2020-05-06 RX ORDER — EMPAGLIFLOZIN 10 MG/1
TABLET, FILM COATED ORAL
Qty: 30 TABLET | Refills: 2 | Status: SHIPPED | OUTPATIENT
Start: 2020-05-06 | End: 2020-09-14

## 2020-05-08 ENCOUNTER — PATIENT MESSAGE (OUTPATIENT)
Dept: VASCULAR SURGERY | Facility: CLINIC | Age: 75
End: 2020-05-08

## 2020-05-12 ENCOUNTER — EXTERNAL HOME HEALTH (OUTPATIENT)
Dept: HOME HEALTH SERVICES | Facility: HOSPITAL | Age: 75
End: 2020-05-12
Payer: MEDICARE

## 2020-06-01 ENCOUNTER — TELEPHONE (OUTPATIENT)
Dept: FAMILY MEDICINE | Facility: CLINIC | Age: 75
End: 2020-06-01

## 2020-06-01 NOTE — TELEPHONE ENCOUNTER
30 day supply of medication with 2 refills sent to pharmacy on 05/06/2020. Patient notified to check with pharmacy and contact us back with any issues.

## 2020-06-01 NOTE — TELEPHONE ENCOUNTER
----- Message from Que Patel sent at 6/1/2020 10:55 AM CDT -----  Contact: Pt   Type:  RX Refill Request    Who Called: Saumya Braun  Refill or New Rx:Refill  RX Name and Strength:JARDIANCE 10 mg tablet   How is the patient currently taking it? (ex. 1XDay):1x  Is this a 30 day or 90 day RX:30 day  Preferred Pharmacy with phone number:  [facility] [place] [address] [facility]  [address] [facility] [address]  Phone: [phone] Fax: [phone]  Local or Mail Order:Local  Ordering Provider:   Would the patient rather a call back or a response via [facility]? Call back  Best Call Back Number:[phone] (home) [phone] (work)  Additional Information: Pt is getting close to being out of medication.

## 2020-06-02 ENCOUNTER — TELEPHONE (OUTPATIENT)
Dept: VASCULAR SURGERY | Facility: CLINIC | Age: 75
End: 2020-06-02

## 2020-06-02 NOTE — TELEPHONE ENCOUNTER
----- Message from Poonam Tate sent at 6/2/2020  3:24 PM CDT -----  Type: Needs Medical Advice  Who Called:  Barbra caretaker    Best Call Back Number: [phone]  Additional Information: patient requesting a return call to schedule a triple bypass surgery, Dr. Viera advise patient to contact,  please contact to advise.

## 2020-06-03 NOTE — TELEPHONE ENCOUNTER
LATE ENTRY- 6/3/2020 1625  Per daughter Clarisa, patient was informed by   Dr Viera to schedule CABG with Dr Venegas.  Seen by Dr Escamilla in April, declined Dr Escamilla's next available. Consultation with Dr Venegas scheduled on 6/18/20, 0800, @ the [facility].

## 2020-06-04 ENCOUNTER — OFFICE VISIT (OUTPATIENT)
Dept: CARDIAC SURGERY | Facility: CLINIC | Age: 75
End: 2020-06-04
Payer: MEDICARE

## 2020-06-04 ENCOUNTER — PATIENT OUTREACH (OUTPATIENT)
Dept: ADMINISTRATIVE | Facility: OTHER | Age: 75
End: 2020-06-04

## 2020-06-04 VITALS
BODY MASS INDEX: 22.92 KG/M2 | WEIGHT: 121.38 LBS | HEART RATE: 80 BPM | SYSTOLIC BLOOD PRESSURE: 152 MMHG | DIASTOLIC BLOOD PRESSURE: 68 MMHG | HEIGHT: 61 IN

## 2020-06-04 DIAGNOSIS — I25.118 CORONARY ARTERY DISEASE OF NATIVE ARTERY OF NATIVE HEART WITH STABLE ANGINA PECTORIS: Primary | ICD-10-CM

## 2020-06-04 PROCEDURE — 3078F PR MOST RECENT DIASTOLIC BLOOD PRESSURE < 80 MM HG: ICD-10-PCS | Mod: HCNC,CPTII,S$GLB, | Performed by: THORACIC SURGERY (CARDIOTHORACIC VASCULAR SURGERY)

## 2020-06-04 PROCEDURE — 1101F PT FALLS ASSESS-DOCD LE1/YR: CPT | Mod: HCNC,CPTII,S$GLB, | Performed by: THORACIC SURGERY (CARDIOTHORACIC VASCULAR SURGERY)

## 2020-06-04 PROCEDURE — 1101F PR PT FALLS ASSESS DOC 0-1 FALLS W/OUT INJ PAST YR: ICD-10-PCS | Mod: HCNC,CPTII,S$GLB, | Performed by: THORACIC SURGERY (CARDIOTHORACIC VASCULAR SURGERY)

## 2020-06-04 PROCEDURE — 1126F AMNT PAIN NOTED NONE PRSNT: CPT | Mod: HCNC,S$GLB,, | Performed by: THORACIC SURGERY (CARDIOTHORACIC VASCULAR SURGERY)

## 2020-06-04 PROCEDURE — 1159F MED LIST DOCD IN RCRD: CPT | Mod: HCNC,S$GLB,, | Performed by: THORACIC SURGERY (CARDIOTHORACIC VASCULAR SURGERY)

## 2020-06-04 PROCEDURE — 99999 PR PBB SHADOW E&M-EST. PATIENT-LVL III: ICD-10-PCS | Mod: PBBFAC,HCNC,, | Performed by: THORACIC SURGERY (CARDIOTHORACIC VASCULAR SURGERY)

## 2020-06-04 PROCEDURE — 1159F PR MEDICATION LIST DOCUMENTED IN MEDICAL RECORD: ICD-10-PCS | Mod: HCNC,S$GLB,, | Performed by: THORACIC SURGERY (CARDIOTHORACIC VASCULAR SURGERY)

## 2020-06-04 PROCEDURE — 99214 PR OFFICE/OUTPT VISIT, EST, LEVL IV, 30-39 MIN: ICD-10-PCS | Mod: HCNC,S$GLB,, | Performed by: THORACIC SURGERY (CARDIOTHORACIC VASCULAR SURGERY)

## 2020-06-04 PROCEDURE — 3078F DIAST BP <80 MM HG: CPT | Mod: HCNC,CPTII,S$GLB, | Performed by: THORACIC SURGERY (CARDIOTHORACIC VASCULAR SURGERY)

## 2020-06-04 PROCEDURE — 1126F PR PAIN SEVERITY QUANTIFIED, NO PAIN PRESENT: ICD-10-PCS | Mod: HCNC,S$GLB,, | Performed by: THORACIC SURGERY (CARDIOTHORACIC VASCULAR SURGERY)

## 2020-06-04 PROCEDURE — 99214 OFFICE O/P EST MOD 30 MIN: CPT | Mod: HCNC,S$GLB,, | Performed by: THORACIC SURGERY (CARDIOTHORACIC VASCULAR SURGERY)

## 2020-06-04 PROCEDURE — 3077F PR MOST RECENT SYSTOLIC BLOOD PRESSURE >= 140 MM HG: ICD-10-PCS | Mod: HCNC,CPTII,S$GLB, | Performed by: THORACIC SURGERY (CARDIOTHORACIC VASCULAR SURGERY)

## 2020-06-04 PROCEDURE — 99999 PR PBB SHADOW E&M-EST. PATIENT-LVL III: CPT | Mod: PBBFAC,HCNC,, | Performed by: THORACIC SURGERY (CARDIOTHORACIC VASCULAR SURGERY)

## 2020-06-04 PROCEDURE — 3077F SYST BP >= 140 MM HG: CPT | Mod: HCNC,CPTII,S$GLB, | Performed by: THORACIC SURGERY (CARDIOTHORACIC VASCULAR SURGERY)

## 2020-06-04 RX ORDER — AMOXICILLIN 500 MG
1 CAPSULE ORAL DAILY
COMMUNITY
End: 2020-09-09

## 2020-06-04 RX ORDER — ASPIRIN 325 MG
100 TABLET, DELAYED RELEASE (ENTERIC COATED) ORAL DAILY
COMMUNITY
End: 2020-09-09

## 2020-06-04 RX ORDER — NAPROXEN SODIUM 220 MG/1
81 TABLET, FILM COATED ORAL DAILY
COMMUNITY
End: 2021-03-29

## 2020-06-04 NOTE — LETTER
June 4, 2020      Jesús Viera MD  [address] [facility] [address] [facility]  [place] [address]           [facility]  [address] [facility] [address]  Phone: [phone]  Fax: [phone]          Patient: Saumya Braun   MR Number: [id number]   YOB: 1945   Date of Visit: 6/4/2020       Dear Dr. Jesús Viera:    Thank you for referring Saumya Braun to me for evaluation. Attached you will find relevant portions of my assessment and plan of care.    If you have questions, please do not hesitate to call me. I look forward to following Saumya Braun along with you.    Sincerely,    Aimee Venegas MD    Enclosure  CC:  No Recipients    If you would like to receive this communication electronically, please contact [email or web address] or [phone] to request more information on [facility] Link access.    For providers and/or their staff who would like to refer a patient to [facility], please contact us through our one-stop-shop provider referral line, [facility], at [phone].    If you feel you have received this communication in error or would no longer like to receive these types of communications, please e-mail [email or web address]

## 2020-06-04 NOTE — PROGRESS NOTES
OFFICE VISIT      This patient was referred to me by Dr. Jseús Viera    HISTORY OF PRESENT ILLNESS:  The patient is a 75-year-old female admitted to the hospital a few months ago with shortness of breath and edema of the lower extremities.  She denies any chest pain.  Her echocardiogram showed significant cardiomyopathy with a low left ventricular ejection fraction.  She was treated medically and improved.  Coronary angiogram showed severe 3 vessel coronary artery disease.  Her most recent echocardiogram done a few weeks ago shows significant improvement in her left ventricular function with an ejection fraction of around 50%.    Past Medical History:   Diagnosis Date    Cataract     OU    Coronary artery disease     Coronary artery disease involving native coronary artery of native heart without angina pectoris 4/7/2020    Hyperlipidemia     Hypertension associated with diabetes 12/8/2015    Ischemic cardiomyopathy     SCC (squamous cell carcinoma) EXCISED  VIA MOHS    MID UPPER FOREHEAD    Squamous Cell Carcinoma     right scalp and mid nasal bridge    Type 2 diabetes mellitus with hyperglycemia, without long-term current use of insulin 10/24/2015       Past Surgical History:   Procedure Laterality Date    CARDIAC CATHETERIZATION  04/02/2020    [place]:  Dr Viera    HYSTERECTOMY      SKIN BIOPSY         Review of patient's allergies indicates:  No Known Allergies    Current Outpatient Medications   Medication Sig Dispense Refill    albuterol 90 mcg/actuation inhaler Inhale 2 puffs into the lungs every 6 (six) hours as needed for Wheezing. Rescue 18 g 6    aspirin 81 MG Chew Take 81 mg by mouth once daily.      blood sugar diagnostic Strp TEST BLOOD SUGAR ONCE DAILY 300 each 3    co-enzyme Q-10 50 mg capsule Take 100 mg by mouth once daily.      estradiol (VIVELLE-DOT) 0.1 mg/24 hr PTSW APPLY 1 PATCH ONTO SKIN TWO TIMES A WEEK 8 patch 11    fluticasone (FLONASE) 50 mcg/actuation nasal  spray 2 sprays (100 mcg total) by Each Nare route once daily. 16 g 6    furosemide (LASIX) 20 MG tablet Take 1 tablet (20 mg total) by mouth once daily. 90 tablet 3    JARDIANCE 10 mg tablet Take 1 tablet (10 mg total) by mouth once daily. 30 tablet 2    Lactobacillus rhamnosus GG (CULTURELLE) 10 billion cell capsule Take 1 capsule by mouth once daily.      metoprolol succinate (TOPROL-XL) 25 MG 24 hr tablet Take 1 tablet (25 mg total) by mouth once daily. (Patient taking differently: Take 50 mg by mouth once daily. ) 90 tablet 3    multivitamin capsule Take 1 capsule by mouth once daily.      omega-3 fatty acids/fish oil (FISH OIL-OMEGA-3 FATTY ACIDS) 300-1,000 mg capsule Take 1 capsule by mouth once daily.      rosuvastatin (CRESTOR) 20 MG tablet Take 1 tablet (20 mg total) by mouth once daily. 90 tablet 0    sacubitriL-valsartan (ENTRESTO) 24-26 mg per tablet Take 2 tablets by mouth 2 (two) times daily.      losartan (COZAAR) 50 MG tablet Take 1 tablet (50 mg total) by mouth once daily. (Patient not taking: Reported on 6/4/2020) 30 tablet 0    SITagliptin (JANUVIA) 100 MG Tab Take 1 tablet (100 mg total) by mouth once daily. (Patient not taking: Reported on 3/26/2020) 30 tablet 3     No current facility-administered medications for this visit.        Family History   Problem Relation Age of Onset    No Known Problems Mother     No Known Problems Father     Skin cancer Neg Hx     Melanoma Neg Hx        Social History     Socioeconomic History    Marital status:      Spouse name: Not on file    Number of children: Not on file    Years of education: Not on file    Highest education level: Not on file   Occupational History    Not on file   Social Needs    Financial resource strain: Not on file    Food insecurity:     Worry: Not on file     Inability: Not on file    Transportation needs:     Medical: Not on file     Non-medical: Not on file   Tobacco Use    Smoking status: Never Smoker     Smokeless tobacco: Never Used   Substance and Sexual Activity    Alcohol use: No    Drug use: No    Sexual activity: Not Currently     Partners: Male     Birth control/protection: Surgical   Lifestyle    Physical activity:     Days per week: Not on file     Minutes per session: Not on file    Stress: Not on file   Relationships    Social connections:     Talks on phone: Not on file     Gets together: Not on file     Attends [employer] service: Not on file     Active member of club or organization: Not on file     Attends meetings of clubs or organizations: Not on file     Relationship status: Not on file   Other Topics Concern    Are you pregnant or think you may be? Not Asked    Breast-feeding Not Asked   Social History Narrative    Not on file       REVIEW OF SYSTEMS:  Shortness of breath and lower extremity edema.  Generalized weakness.  All other systems are reviewed and are negative.        PHYSICAL EXAM:  Physical Exam   Constitutional: She is oriented to person, place, and time. She appears well-developed and well-nourished. No distress.   HENT:   Head: Normocephalic and atraumatic.   Eyes: Pupils are equal, round, and reactive to light. Conjunctivae and EOM are normal. No scleral icterus.   Neck: Normal range of motion. Neck supple. No JVD present. No tracheal deviation present.   Cardiovascular: Normal rate, regular rhythm and normal heart sounds.   2+ left dorsalis pedis and 1+ right dorsalis pedis pulse.  She has normal palpable femoral brachial and radial pulses bilaterally.   Pulmonary/Chest: Effort normal and breath sounds normal. No stridor. She has no wheezes.   Abdominal: Soft. She exhibits no mass. There is no tenderness. There is no guarding.   Musculoskeletal: Normal range of motion. She exhibits no edema or deformity.   Neurological: She is alert and oriented to person, place, and time. She displays normal reflexes. No sensory deficit. She exhibits normal muscle tone.   Skin: Skin is  warm. Capillary refill takes less than 2 seconds. She is not diaphoretic.   Scar on the on the forehead from previous resection of skin cancer.       Vitals:    06/04/20 1120   BP: (!) 152/68   Pulse: 80         IMPRESSION:  1. Coronary Artery Disease  2. Diabetes mellitus.  3. Hypertension  4. Hyperlipidemia      RECOMMENDATIONS: I think that she will benefit from coronary artery bypass graft. Risks and benefits were discussed. She voiced understanding and wishes to proceed.        Jamie Venegas MD

## 2020-06-08 ENCOUNTER — TELEPHONE (OUTPATIENT)
Dept: VASCULAR SURGERY | Facility: CLINIC | Age: 75
End: 2020-06-08

## 2020-06-08 NOTE — TELEPHONE ENCOUNTER
----- Message from Elli Cruz sent at 6/8/2020  1:45 PM CDT -----  Contact: pt  States she would like to get a possible surgery date. Please call pt [phone]. Thank you

## 2020-06-08 NOTE — TELEPHONE ENCOUNTER
----- Message from Alyce Wasserman sent at 6/8/2020 12:41 PM CDT -----  Contact: Clarisa/Daughter in law  Type: Needs Medical Advice  Who Called:  Clarisa  Best Call Back Number: [phone]  Additional Information: Clarisa states she would like to know when patient will be scheduled for surgery.  Please call to advise.  Thanks!

## 2020-06-09 NOTE — TELEPHONE ENCOUNTER
----- Message from Saumya Young sent at 6/9/2020 12:08 PM CDT -----  Contact: [facility] Doss   Stated pt surgery wasn't authorized and needs to be reschedule, she can be reached at [phone]

## 2020-06-10 ENCOUNTER — OUTSIDE PLACE OF SERVICE (OUTPATIENT)
Dept: ADMINISTRATIVE | Facility: OTHER | Age: 75
End: 2020-06-10
Payer: MEDICARE

## 2020-06-10 PROCEDURE — 33533 PR CABG, ARTERIAL, SINGLE: ICD-10-PCS | Mod: AS,,, | Performed by: NURSE PRACTITIONER

## 2020-06-10 PROCEDURE — 33519 CABG ARTERY-VEIN THREE: CPT | Mod: AS,,, | Performed by: NURSE PRACTITIONER

## 2020-06-10 PROCEDURE — 33508 ENDOSCOPIC VEIN HARVEST: CPT | Mod: ,,, | Performed by: THORACIC SURGERY (CARDIOTHORACIC VASCULAR SURGERY)

## 2020-06-10 PROCEDURE — 33533 CABG ARTERIAL SINGLE: CPT | Mod: AS,,, | Performed by: NURSE PRACTITIONER

## 2020-06-10 PROCEDURE — 33519 PR CABG, ARTERY-VEIN, THREE: ICD-10-PCS | Mod: AS,,, | Performed by: NURSE PRACTITIONER

## 2020-06-10 PROCEDURE — 33533 PR CABG, ARTERIAL, SINGLE: ICD-10-PCS | Mod: ,,, | Performed by: THORACIC SURGERY (CARDIOTHORACIC VASCULAR SURGERY)

## 2020-06-10 PROCEDURE — 33519 CABG ARTERY-VEIN THREE: CPT | Mod: ,,, | Performed by: THORACIC SURGERY (CARDIOTHORACIC VASCULAR SURGERY)

## 2020-06-10 PROCEDURE — 33519 PR CABG, ARTERY-VEIN, THREE: ICD-10-PCS | Mod: ,,, | Performed by: THORACIC SURGERY (CARDIOTHORACIC VASCULAR SURGERY)

## 2020-06-10 PROCEDURE — 33533 CABG ARTERIAL SINGLE: CPT | Mod: ,,, | Performed by: THORACIC SURGERY (CARDIOTHORACIC VASCULAR SURGERY)

## 2020-06-10 PROCEDURE — 33508 PR ENDOSCOPY W/VIDEO-ASST VEIN HARVEST,CABG: ICD-10-PCS | Mod: ,,, | Performed by: THORACIC SURGERY (CARDIOTHORACIC VASCULAR SURGERY)

## 2020-06-11 ENCOUNTER — OUTSIDE PLACE OF SERVICE (OUTPATIENT)
Dept: ADMINISTRATIVE | Facility: OTHER | Age: 75
End: 2020-06-11
Payer: MEDICARE

## 2020-06-11 PROCEDURE — 99024 PR POST-OP FOLLOW-UP VISIT: ICD-10-PCS | Mod: ,,, | Performed by: NURSE PRACTITIONER

## 2020-06-11 PROCEDURE — 99024 POSTOP FOLLOW-UP VISIT: CPT | Mod: ,,, | Performed by: NURSE PRACTITIONER

## 2020-06-12 ENCOUNTER — OUTSIDE PLACE OF SERVICE (OUTPATIENT)
Dept: ADMINISTRATIVE | Facility: OTHER | Age: 75
End: 2020-06-12
Payer: MEDICARE

## 2020-06-12 PROCEDURE — 99024 POSTOP FOLLOW-UP VISIT: CPT | Mod: ,,, | Performed by: NURSE PRACTITIONER

## 2020-06-12 PROCEDURE — 99024 PR POST-OP FOLLOW-UP VISIT: ICD-10-PCS | Mod: ,,, | Performed by: NURSE PRACTITIONER

## 2020-06-13 ENCOUNTER — OUTSIDE PLACE OF SERVICE (OUTPATIENT)
Dept: ADMINISTRATIVE | Facility: OTHER | Age: 75
End: 2020-06-13
Payer: MEDICARE

## 2020-06-13 PROCEDURE — 99024 POSTOP FOLLOW-UP VISIT: CPT | Mod: ,,, | Performed by: THORACIC SURGERY (CARDIOTHORACIC VASCULAR SURGERY)

## 2020-06-13 PROCEDURE — 99024 PR POST-OP FOLLOW-UP VISIT: ICD-10-PCS | Mod: ,,, | Performed by: THORACIC SURGERY (CARDIOTHORACIC VASCULAR SURGERY)

## 2020-06-15 ENCOUNTER — OUTSIDE PLACE OF SERVICE (OUTPATIENT)
Dept: ADMINISTRATIVE | Facility: OTHER | Age: 75
End: 2020-06-15
Payer: MEDICARE

## 2020-06-15 PROCEDURE — 99024 POSTOP FOLLOW-UP VISIT: CPT | Mod: ,,, | Performed by: PHYSICIAN ASSISTANT

## 2020-06-15 PROCEDURE — 99024 PR POST-OP FOLLOW-UP VISIT: ICD-10-PCS | Mod: ,,, | Performed by: PHYSICIAN ASSISTANT

## 2020-06-16 ENCOUNTER — OUTSIDE PLACE OF SERVICE (OUTPATIENT)
Dept: ADMINISTRATIVE | Facility: OTHER | Age: 75
End: 2020-06-16
Payer: MEDICARE

## 2020-06-16 PROCEDURE — 99024 PR POST-OP FOLLOW-UP VISIT: ICD-10-PCS | Mod: ,,, | Performed by: PHYSICIAN ASSISTANT

## 2020-06-16 PROCEDURE — 99024 POSTOP FOLLOW-UP VISIT: CPT | Mod: ,,, | Performed by: PHYSICIAN ASSISTANT

## 2020-06-17 PROCEDURE — G0180 MD CERTIFICATION HHA PATIENT: HCPCS | Mod: ,,, | Performed by: THORACIC SURGERY (CARDIOTHORACIC VASCULAR SURGERY)

## 2020-06-17 PROCEDURE — G0180 PR HOME HEALTH MD CERTIFICATION: ICD-10-PCS | Mod: ,,, | Performed by: THORACIC SURGERY (CARDIOTHORACIC VASCULAR SURGERY)

## 2020-06-21 ENCOUNTER — PATIENT MESSAGE (OUTPATIENT)
Dept: CARDIAC SURGERY | Facility: CLINIC | Age: 75
End: 2020-06-21

## 2020-06-24 ENCOUNTER — DOCUMENT SCAN (OUTPATIENT)
Dept: HOME HEALTH SERVICES | Facility: HOSPITAL | Age: 75
End: 2020-06-24
Payer: MEDICARE

## 2020-06-25 ENCOUNTER — EXTERNAL HOME HEALTH (OUTPATIENT)
Dept: HOME HEALTH SERVICES | Facility: HOSPITAL | Age: 75
End: 2020-06-25
Payer: MEDICARE

## 2020-07-05 ENCOUNTER — PATIENT MESSAGE (OUTPATIENT)
Dept: VASCULAR SURGERY | Facility: CLINIC | Age: 75
End: 2020-07-05

## 2020-07-07 ENCOUNTER — TELEPHONE (OUTPATIENT)
Dept: VASCULAR SURGERY | Facility: CLINIC | Age: 75
End: 2020-07-07

## 2020-07-07 NOTE — TELEPHONE ENCOUNTER
Spoke to Russel, informed HH and/or cardiac rehab not ordered by Dr Venegas, recommended he reach out to patient's cardiologist, voices understanding and agrees.

## 2020-07-09 ENCOUNTER — OFFICE VISIT (OUTPATIENT)
Dept: VASCULAR SURGERY | Facility: CLINIC | Age: 75
End: 2020-07-09
Payer: MEDICARE

## 2020-07-09 VITALS
DIASTOLIC BLOOD PRESSURE: 73 MMHG | WEIGHT: 116.88 LBS | HEART RATE: 81 BPM | SYSTOLIC BLOOD PRESSURE: 134 MMHG | HEIGHT: 61 IN | BODY MASS INDEX: 22.07 KG/M2

## 2020-07-09 DIAGNOSIS — Z95.1 S/P CABG (CORONARY ARTERY BYPASS GRAFT): Primary | ICD-10-CM

## 2020-07-09 PROCEDURE — 99024 PR POST-OP FOLLOW-UP VISIT: ICD-10-PCS | Mod: HCNC,S$GLB,, | Performed by: THORACIC SURGERY (CARDIOTHORACIC VASCULAR SURGERY)

## 2020-07-09 PROCEDURE — 99999 PR PBB SHADOW E&M-EST. PATIENT-LVL IV: ICD-10-PCS | Mod: PBBFAC,HCNC,, | Performed by: THORACIC SURGERY (CARDIOTHORACIC VASCULAR SURGERY)

## 2020-07-09 PROCEDURE — 99999 PR PBB SHADOW E&M-EST. PATIENT-LVL IV: CPT | Mod: PBBFAC,HCNC,, | Performed by: THORACIC SURGERY (CARDIOTHORACIC VASCULAR SURGERY)

## 2020-07-09 PROCEDURE — 99024 POSTOP FOLLOW-UP VISIT: CPT | Mod: HCNC,S$GLB,, | Performed by: THORACIC SURGERY (CARDIOTHORACIC VASCULAR SURGERY)

## 2020-07-09 RX ORDER — ACETAMINOPHEN 500 MG
1 TABLET ORAL
COMMUNITY
End: 2020-09-09

## 2020-07-09 RX ORDER — METOPROLOL TARTRATE 100 MG/1
100 TABLET ORAL 2 TIMES DAILY
COMMUNITY
End: 2020-08-18

## 2020-07-09 NOTE — PROGRESS NOTES
The patient is a 75-year-old female who underwent coronary artery bypass grafting on 06/11/2020.  She states that she is feeling fine.  Her only complaint is a mild numbness of her left 4th and 5th fingers and I reassured her about that.  It is usually caused by compression of the brachial plexus between the clavicle and 1st rib and most of the time it is temporary.  The sternum is stable and her incisions have healed well.  Heart has regular rate and rhythm lungs are clear.  She will follow up with her cardiologist and return to see me on a p.r.n. basis.

## 2020-07-14 RX ORDER — ROSUVASTATIN CALCIUM 20 MG/1
20 TABLET, COATED ORAL DAILY
Qty: 90 TABLET | Refills: 0 | Status: SHIPPED | OUTPATIENT
Start: 2020-07-14 | End: 2020-11-02 | Stop reason: SDUPTHER

## 2020-07-20 ENCOUNTER — TELEPHONE (OUTPATIENT)
Dept: VASCULAR SURGERY | Facility: CLINIC | Age: 75
End: 2020-07-20

## 2020-07-20 NOTE — TELEPHONE ENCOUNTER
Instructed patient to reach out to PCP for advice on shoulder.  Voices understanding and is agreeable.

## 2020-07-20 NOTE — TELEPHONE ENCOUNTER
----- Message from Bam Aguirre sent at 7/20/2020 11:20 AM CDT -----  Regarding: PT advice  Type:  Needs Medical Advice    Who Called: PT   Symptoms (please be specific):  possibly pulled a muscle in RT Shoulder    How long has patient had these symptoms: 5 days   Would the patient rather a call back or a response via [facility]? Call back   Best Call Back Number: [phone] (cell)  Additional Information: The patient is requesting to speak with nurse taylor if possible, please advise.

## 2020-08-18 DIAGNOSIS — Z13.89 SCREENING FOR HEMATURIA OR PROTEINURIA: ICD-10-CM

## 2020-08-18 DIAGNOSIS — Z13.21 ENCOUNTER FOR VITAMIN DEFICIENCY SCREENING: ICD-10-CM

## 2020-08-18 DIAGNOSIS — Z13.0 SCREENING FOR DEFICIENCY ANEMIA: ICD-10-CM

## 2020-08-18 DIAGNOSIS — I15.2 HYPERTENSION ASSOCIATED WITH DIABETES: Chronic | ICD-10-CM

## 2020-08-18 DIAGNOSIS — I25.5 ISCHEMIC CARDIOMYOPATHY: ICD-10-CM

## 2020-08-18 DIAGNOSIS — I25.10 MULTIPLE VESSEL CORONARY ARTERY DISEASE: ICD-10-CM

## 2020-08-18 DIAGNOSIS — E11.69 TYPE 2 DIABETES MELLITUS WITH HYPERTRIGLYCERIDEMIA: Primary | Chronic | ICD-10-CM

## 2020-08-18 DIAGNOSIS — Z79.899 ENCOUNTER FOR LONG-TERM CURRENT USE OF MEDICATION: ICD-10-CM

## 2020-08-18 DIAGNOSIS — Z11.59 NEED FOR HEPATITIS C SCREENING TEST: ICD-10-CM

## 2020-08-18 DIAGNOSIS — E11.65 TYPE 2 DIABETES MELLITUS WITH HYPERGLYCEMIA, WITHOUT LONG-TERM CURRENT USE OF INSULIN: Chronic | ICD-10-CM

## 2020-08-18 DIAGNOSIS — E78.1 TYPE 2 DIABETES MELLITUS WITH HYPERTRIGLYCERIDEMIA: Primary | Chronic | ICD-10-CM

## 2020-08-18 DIAGNOSIS — Z13.29 SCREENING FOR THYROID DISORDER: ICD-10-CM

## 2020-08-18 DIAGNOSIS — E11.59 HYPERTENSION ASSOCIATED WITH DIABETES: Chronic | ICD-10-CM

## 2020-08-18 DIAGNOSIS — I25.708 CORONARY ARTERY DISEASE OF BYPASS GRAFT OF NATIVE HEART WITH STABLE ANGINA PECTORIS: ICD-10-CM

## 2020-08-18 RX ORDER — CLOPIDOGREL BISULFATE 75 MG/1
75 TABLET ORAL DAILY
COMMUNITY
Start: 2020-08-18

## 2020-08-18 RX ORDER — METOPROLOL SUCCINATE 100 MG/1
TABLET, EXTENDED RELEASE ORAL
COMMUNITY
Start: 2020-07-30

## 2020-08-25 PROBLEM — E78.00 PURE HYPERCHOLESTEROLEMIA: Status: ACTIVE | Noted: 2020-06-12

## 2020-08-25 PROBLEM — R91.1 PULMONARY NODULE: Status: ACTIVE | Noted: 2020-03-26

## 2020-08-25 PROBLEM — J96.00 ACUTE RESPIRATORY FAILURE: Status: ACTIVE | Noted: 2020-08-25

## 2020-08-25 PROBLEM — I25.118 CORONARY ARTERY DISEASE WITH STABLE ANGINA PECTORIS: Status: ACTIVE | Noted: 2020-06-10

## 2020-08-25 PROBLEM — I21.4 NSTEMI (NON-ST ELEVATED MYOCARDIAL INFARCTION): Status: ACTIVE | Noted: 2020-03-26

## 2020-08-25 PROBLEM — Z95.1 STATUS POST AORTO-CORONARY ARTERY BYPASS GRAFT: Status: ACTIVE | Noted: 2020-06-12

## 2020-08-25 PROBLEM — I25.10 MULTIPLE VESSEL CORONARY ARTERY DISEASE: Status: ACTIVE | Noted: 2020-04-02

## 2020-08-25 RX ORDER — EMPAGLIFLOZIN 10 MG/1
TABLET, FILM COATED ORAL
Qty: 30 TABLET | Refills: 2 | OUTPATIENT
Start: 2020-08-25

## 2020-08-27 RX ORDER — ESTRADIOL 0.1 MG/D
FILM, EXTENDED RELEASE TRANSDERMAL
Qty: 8 PATCH | Refills: 11 | OUTPATIENT
Start: 2020-08-27

## 2020-08-27 NOTE — TELEPHONE ENCOUNTER
----- Message from Joana Lopez sent at 8/27/2020  2:30 PM CDT -----  Regarding: refill  Contact: patient  Type:  RX Refill Request    Who Called: patient  Refill or New Rx:refill  RX Name and Strength:Estradioal patches, 1mg  How is the patient currently taking it? (ex. 1XDay):  Is this a 30 day or 90 day RX:  Preferred Pharmacy with phone number:[facility]  Local or Mail Order:local  Ordering Provider:LORENE Medrano  Would the patient rather a call back or a response via [facility]? call  Best Call Back Number:[phone]   Additional Information:

## 2020-08-31 DIAGNOSIS — E78.5 HYPERLIPIDEMIA, UNSPECIFIED HYPERLIPIDEMIA TYPE: ICD-10-CM

## 2020-08-31 RX ORDER — ESTRADIOL 0.1 MG/D
FILM, EXTENDED RELEASE TRANSDERMAL
Qty: 8 PATCH | Refills: 11 | OUTPATIENT
Start: 2020-08-31

## 2020-09-01 ENCOUNTER — OFFICE VISIT (OUTPATIENT)
Dept: FAMILY MEDICINE | Facility: CLINIC | Age: 75
End: 2020-09-01
Payer: MEDICARE

## 2020-09-01 VITALS
BODY MASS INDEX: 22.28 KG/M2 | TEMPERATURE: 98 F | HEIGHT: 61 IN | HEART RATE: 72 BPM | DIASTOLIC BLOOD PRESSURE: 67 MMHG | SYSTOLIC BLOOD PRESSURE: 134 MMHG | WEIGHT: 118 LBS

## 2020-09-01 DIAGNOSIS — H35.52 RETINITIS PIGMENTOSA, BOTH EYES: Chronic | ICD-10-CM

## 2020-09-01 DIAGNOSIS — E11.59 TYPE 2 DIABETES MELLITUS WITH OTHER CIRCULATORY COMPLICATION, WITHOUT LONG-TERM CURRENT USE OF INSULIN: Primary | ICD-10-CM

## 2020-09-01 DIAGNOSIS — I21.4 NSTEMI (NON-ST ELEVATED MYOCARDIAL INFARCTION): ICD-10-CM

## 2020-09-01 DIAGNOSIS — H35.3132 NONEXUDATIVE AGE-RELATED MACULAR DEGENERATION, BILATERAL, INTERMEDIATE DRY STAGE: Chronic | ICD-10-CM

## 2020-09-01 DIAGNOSIS — I15.2 HYPERTENSION ASSOCIATED WITH DIABETES: Chronic | ICD-10-CM

## 2020-09-01 DIAGNOSIS — Z85.828 HISTORY OF SCC (SQUAMOUS CELL CARCINOMA) OF SKIN: ICD-10-CM

## 2020-09-01 DIAGNOSIS — I25.5 ISCHEMIC CARDIOMYOPATHY: ICD-10-CM

## 2020-09-01 DIAGNOSIS — E11.59 HYPERTENSION ASSOCIATED WITH DIABETES: Chronic | ICD-10-CM

## 2020-09-01 DIAGNOSIS — J96.00 ACUTE RESPIRATORY FAILURE, UNSPECIFIED WHETHER WITH HYPOXIA OR HYPERCAPNIA: ICD-10-CM

## 2020-09-01 DIAGNOSIS — E11.69 HYPERLIPIDEMIA ASSOCIATED WITH TYPE 2 DIABETES MELLITUS: ICD-10-CM

## 2020-09-01 DIAGNOSIS — E78.5 HYPERLIPIDEMIA ASSOCIATED WITH TYPE 2 DIABETES MELLITUS: ICD-10-CM

## 2020-09-01 DIAGNOSIS — H25.13 NS (NUCLEAR SCLEROSIS), BILATERAL: Chronic | ICD-10-CM

## 2020-09-01 DIAGNOSIS — I25.10 MULTIPLE VESSEL CORONARY ARTERY DISEASE: ICD-10-CM

## 2020-09-01 PROCEDURE — 3075F SYST BP GE 130 - 139MM HG: CPT | Mod: HCNC,CPTII,S$GLB, | Performed by: NURSE PRACTITIONER

## 2020-09-01 PROCEDURE — 3075F PR MOST RECENT SYSTOLIC BLOOD PRESS GE 130-139MM HG: ICD-10-PCS | Mod: HCNC,CPTII,S$GLB, | Performed by: NURSE PRACTITIONER

## 2020-09-01 PROCEDURE — 99499 RISK ADDL DX/OHS AUDIT: ICD-10-PCS | Mod: S$GLB,,, | Performed by: NURSE PRACTITIONER

## 2020-09-01 PROCEDURE — 3078F DIAST BP <80 MM HG: CPT | Mod: HCNC,CPTII,S$GLB, | Performed by: NURSE PRACTITIONER

## 2020-09-01 PROCEDURE — 99999 PR PBB SHADOW E&M-EST. PATIENT-LVL V: ICD-10-PCS | Mod: PBBFAC,HCNC,, | Performed by: NURSE PRACTITIONER

## 2020-09-01 PROCEDURE — G0439 PR MEDICARE ANNUAL WELLNESS SUBSEQUENT VISIT: ICD-10-PCS | Mod: HCNC,S$GLB,, | Performed by: NURSE PRACTITIONER

## 2020-09-01 PROCEDURE — 3052F PR MOST RECENT HEMOGLOBIN A1C LEVEL 8.0 - < 9.0%: ICD-10-PCS | Mod: HCNC,CPTII,S$GLB, | Performed by: NURSE PRACTITIONER

## 2020-09-01 PROCEDURE — 99999 PR PBB SHADOW E&M-EST. PATIENT-LVL V: CPT | Mod: PBBFAC,HCNC,, | Performed by: NURSE PRACTITIONER

## 2020-09-01 PROCEDURE — 3052F HG A1C>EQUAL 8.0%<EQUAL 9.0%: CPT | Mod: HCNC,CPTII,S$GLB, | Performed by: NURSE PRACTITIONER

## 2020-09-01 PROCEDURE — 99499 UNLISTED E&M SERVICE: CPT | Mod: S$GLB,,, | Performed by: NURSE PRACTITIONER

## 2020-09-01 PROCEDURE — G0439 PPPS, SUBSEQ VISIT: HCPCS | Mod: HCNC,S$GLB,, | Performed by: NURSE PRACTITIONER

## 2020-09-01 PROCEDURE — 3078F PR MOST RECENT DIASTOLIC BLOOD PRESSURE < 80 MM HG: ICD-10-PCS | Mod: HCNC,CPTII,S$GLB, | Performed by: NURSE PRACTITIONER

## 2020-09-01 RX ORDER — METOPROLOL TARTRATE 50 MG/1
50 TABLET ORAL 2 TIMES DAILY
COMMUNITY
Start: 2020-06-16 | End: 2020-09-09

## 2020-09-01 NOTE — PATIENT INSTRUCTIONS
Continue current medications  F/U with specialists as scheduled  Annual exam with PCP as scheduled  RTC as needed

## 2020-09-01 NOTE — PROGRESS NOTES
"  Saumya Braun presented for a follow-up Medicare AWV today. The following components were reviewed and updated:    · Medical history  · Family History  · Social history  · Allergies and Current Medications  · Health Risk Assessment  · Health Maintenance  · Care Team    **See Completed Assessments for Annual Wellness visit with in the encounter summary    The following assessments were completed:  · Depression Screening  · Cognitive function Screening  · Timed Get Up Test  · Whisper Test  · PHQ-2  · Nutrition screen  · ADL  · PAQ  · Osteoporosis risk  · CAGE  · Living situation       Vitals:    09/01/20 1133   BP: 134/67   Pulse: 72   Temp: 97.7 °F (36.5 °C)   Weight: 53.5 kg (118 lb)   Height: 5' 1" (1.549 m)     Body mass index is 22.3 kg/m².   ]        Diagnoses and health risks identified today and associated recommendations/orders:  1. Type 2 diabetes mellitus with other circulatory complication, without long-term current use of insulin  Uncontrolled  Hgb A1C, urine microalbumin, eye exam, foot exam due  Annual exam scheduled with PCP      2. Hypertension associated with diabetes  Stable  Managed by cardiology  F/U appt scheduled in October per pt report  Continue current medications    3. Hyperlipidemia associated with type 2 diabetes mellitus  Stable  Lipids due  Continue current medications    4. Ischemic cardiomyopathy  Stable  Managed by cardiology  F/U appt scheduled in October per pt report  Continue current medications      5. Multiple vessel coronary artery disease  Stable  Managed by cardiology  CABG in June 2020  F/U appt scheduled in October per pt report  Continue current medications      6. NSTEMI (non-ST elevated myocardial infarction)  Stable  Managed by cardiology  CABG in June 2020  F/U appt scheduled in October per pt report  Continue current medications      7. Acute respiratory failure, unspecified whether with hypoxia or hypercapnia  Resolved  ARF r/t CHF  F/U with PCP  Annual exam " scheduled with PCP    8. Nonexudative age-related macular degeneration, bilateral, intermediate dry stage  Stable  Managed by ophthalmology; sees yearly  Continue current medication and plan of care    9. NS (nuclear sclerosis), bilateral  Stable  Managed by ophthalmology; sees yearly  Continue current medication and plan of care    10. Retinitis pigmentosa, both eyes  Stable  Managed by ophthalmology; sees yearly  Continue current medication and plan of care    11. History of SCC (squamous cell carcinoma) of skin  Stable  Has not seen dermatology in > 1 y  Recommend dermatology f/u   - Ambulatory referral/consult to Dermatology; Future    Annual exam scheduled with PCP     Provided Saumya with a 5-10 year written screening schedule and personal prevention plan. Recommendations were developed using the USPSTF age appropriate recommendations. Education, counseling, and referrals were provided as needed.  After Visit Summary printed and given to patient which includes a list of additional screenings\tests needed.    No follow-ups on file.      Zelda Tena NP

## 2020-09-09 ENCOUNTER — OFFICE VISIT (OUTPATIENT)
Dept: FAMILY MEDICINE | Facility: CLINIC | Age: 75
End: 2020-09-09
Payer: MEDICARE

## 2020-09-09 VITALS
SYSTOLIC BLOOD PRESSURE: 118 MMHG | HEART RATE: 68 BPM | OXYGEN SATURATION: 99 % | BODY MASS INDEX: 22.09 KG/M2 | DIASTOLIC BLOOD PRESSURE: 58 MMHG | TEMPERATURE: 98 F | HEIGHT: 61 IN | WEIGHT: 117 LBS

## 2020-09-09 DIAGNOSIS — Z79.899 LONG TERM CURRENT USE OF DIURETIC: Chronic | ICD-10-CM

## 2020-09-09 DIAGNOSIS — Z79.890 POST-MENOPAUSE ON HRT (HORMONE REPLACEMENT THERAPY): Chronic | ICD-10-CM

## 2020-09-09 DIAGNOSIS — Z79.02 ANTIPLATELET OR ANTITHROMBOTIC LONG-TERM USE: Chronic | ICD-10-CM

## 2020-09-09 DIAGNOSIS — E78.5 HYPERLIPIDEMIA ASSOCIATED WITH TYPE 2 DIABETES MELLITUS: Chronic | ICD-10-CM

## 2020-09-09 DIAGNOSIS — R91.1 SOLITARY PULMONARY NODULE: ICD-10-CM

## 2020-09-09 DIAGNOSIS — R91.8 MULTIPLE PULMONARY NODULES: Chronic | ICD-10-CM

## 2020-09-09 DIAGNOSIS — Z95.1 STATUS POST AORTO-CORONARY ARTERY BYPASS GRAFT: Chronic | ICD-10-CM

## 2020-09-09 DIAGNOSIS — I25.5 ISCHEMIC CARDIOMYOPATHY: Chronic | ICD-10-CM

## 2020-09-09 DIAGNOSIS — E11.59 HYPERTENSION ASSOCIATED WITH DIABETES: Chronic | ICD-10-CM

## 2020-09-09 DIAGNOSIS — Z01.419 WELL WOMAN EXAM: ICD-10-CM

## 2020-09-09 DIAGNOSIS — E11.69 HYPERLIPIDEMIA ASSOCIATED WITH TYPE 2 DIABETES MELLITUS: Chronic | ICD-10-CM

## 2020-09-09 DIAGNOSIS — I50.22 CHRONIC SYSTOLIC HEART FAILURE: Chronic | ICD-10-CM

## 2020-09-09 DIAGNOSIS — I15.2 HYPERTENSION ASSOCIATED WITH DIABETES: Chronic | ICD-10-CM

## 2020-09-09 DIAGNOSIS — Z79.82 ASPIRIN LONG-TERM USE: Chronic | ICD-10-CM

## 2020-09-09 DIAGNOSIS — E11.9 TYPE 2 DIABETES MELLITUS WITHOUT COMPLICATION, WITHOUT LONG-TERM CURRENT USE OF INSULIN: Primary | Chronic | ICD-10-CM

## 2020-09-09 DIAGNOSIS — I25.10 CORONARY ARTERY DISEASE INVOLVING NATIVE CORONARY ARTERY OF NATIVE HEART WITHOUT ANGINA PECTORIS: Chronic | ICD-10-CM

## 2020-09-09 PROCEDURE — 99214 PR OFFICE/OUTPT VISIT, EST, LEVL IV, 30-39 MIN: ICD-10-PCS | Mod: HCNC,S$GLB,, | Performed by: INTERNAL MEDICINE

## 2020-09-09 PROCEDURE — 99999 PR PBB SHADOW E&M-EST. PATIENT-LVL V: CPT | Mod: PBBFAC,HCNC,, | Performed by: INTERNAL MEDICINE

## 2020-09-09 PROCEDURE — 3052F PR MOST RECENT HEMOGLOBIN A1C LEVEL 8.0 - < 9.0%: ICD-10-PCS | Mod: HCNC,CPTII,S$GLB, | Performed by: INTERNAL MEDICINE

## 2020-09-09 PROCEDURE — 99499 UNLISTED E&M SERVICE: CPT | Mod: S$GLB,,, | Performed by: INTERNAL MEDICINE

## 2020-09-09 PROCEDURE — 99999 PR PBB SHADOW E&M-EST. PATIENT-LVL V: ICD-10-PCS | Mod: PBBFAC,HCNC,, | Performed by: INTERNAL MEDICINE

## 2020-09-09 PROCEDURE — 99499 RISK ADDL DX/OHS AUDIT: ICD-10-PCS | Mod: S$GLB,,, | Performed by: INTERNAL MEDICINE

## 2020-09-09 PROCEDURE — 3052F HG A1C>EQUAL 8.0%<EQUAL 9.0%: CPT | Mod: HCNC,CPTII,S$GLB, | Performed by: INTERNAL MEDICINE

## 2020-09-09 PROCEDURE — 99214 OFFICE O/P EST MOD 30 MIN: CPT | Mod: HCNC,S$GLB,, | Performed by: INTERNAL MEDICINE

## 2020-09-09 RX ORDER — AMOXICILLIN 500 MG
1 CAPSULE ORAL DAILY
COMMUNITY
Start: 2020-09-09 | End: 2021-03-29

## 2020-09-09 RX ORDER — ASPIRIN 325 MG
100 TABLET, DELAYED RELEASE (ENTERIC COATED) ORAL DAILY
COMMUNITY
Start: 2020-09-09 | End: 2021-03-29 | Stop reason: DRUGHIGH

## 2020-09-09 NOTE — PROGRESS NOTES
Subjective:      Date: 9/9/20    Patient ID: Saumya Braun is a 75 y.o. female.    Chief Complaint: Diabetes and Coronary Artery Disease    HPI   75F here to follow up diabetes and chronic health conditions. The patient's last visit with me was on 3/26/2020.    At our last visit she was been seen for an urgent visit in which she was feeling chest tightness and shortness of breath. EKG and symptoms were concerning to me for ACS so I facilitated transfer via EMS to [facility]. She was admitted and diuresed with LHC performed on 4/2/20 showing EF 20% (iCM), regional WMA of LV apex, and severe 3V CAD. She was discharged with medical management and underwent repeat TTE on 6/2/20 showed EF had improved to 45-50% with anteroapical dyskinesis, GII/IV DD and AS. CABG x4 performed by Dr. Lugo on 6/10/20 (left internal mammary artery to left anterior descending artery, saphenous vein graft to the right coronary artery, saphenous vein graft to the obtuse marginal branch, and saphenous vein graft to the third diagonal branch.) She followed up with Dr. Lugo on 7/9/20 and was advised to f/u PRN. She saw Dr. Viera on 7/30/20. Toprol XL was increased to 100 mg. Repeat TTE planned for October. Crestor was intensified from 5 mg to 20 mg. Plavix and Lasix 20 mg added to medication regimen. Last labs from June reviewed. She is now vigilantly monitoring her BP/Pulse, weights, and sugars. She is now longer on losartan. For the diabetes she is taking jardiance 10 mg daily. She remains on Vivelle Dot, which I advised should be re-evaluated by Gynecology considering extensive cardiac history. I have referred her to Dr. Harrell to establish care and discuss HRT. Foot exam done today. Eye exam needed. Labs ordered.       Review of patient's allergies indicates:  No Known Allergies    Current Outpatient Medications:     aspirin 81 MG Chew, Take 81 mg by mouth once daily., Disp: , Rfl:     blood sugar diagnostic Strp, TEST BLOOD SUGAR  ONCE DAILY, Disp: 300 each, Rfl: 3    clopidogreL (PLAVIX) 75 mg tablet, Take 75 mg by mouth once daily. , Disp: , Rfl:     co-enzyme Q-10 50 mg capsule, Take 2 capsules (100 mg total) by mouth once daily., Disp:  , Rfl:     estradiol (VIVELLE-DOT) 0.1 mg/24 hr PTSW, APPLY 1 PATCH ONTO SKIN TWO TIMES A WEEK, Disp: 8 patch, Rfl: 11    fluticasone (FLONASE) 50 mcg/actuation nasal spray, 2 sprays (100 mcg total) by Each Nare route once daily., Disp: 16 g, Rfl: 6    furosemide (LASIX) 20 MG tablet, Take 1 tablet (20 mg total) by mouth once daily., Disp: 90 tablet, Rfl: 3    JARDIANCE 10 mg tablet, TAKE 1 TABLET BY MOUTH 1 TIME A DAY, Disp: 30 tablet, Rfl: 3    Lactobacillus rhamnosus GG (CULTURELLE) 10 billion cell capsule, Take 1 capsule by mouth once daily., Disp: , Rfl:     metoprolol succinate (TOPROL-XL) 100 MG 24 hr tablet, Take 1 tablet (100 mg total) by mouth daily, Disp: , Rfl:     multivitamin capsule, Take 1 capsule by mouth once daily., Disp:  , Rfl:     omega-3 fatty acids/fish oil (FISH OIL-OMEGA-3 FATTY ACIDS) 300-1,000 mg capsule, Take 1 capsule by mouth once daily., Disp:  , Rfl:     rosuvastatin (CRESTOR) 20 MG tablet, Take 1 tablet (20 mg total) by mouth once daily., Disp: 90 tablet, Rfl: 0    triamcinolone acetonide 0.1% (KENALOG) 0.1 % cream, Apply topically 2 (two) times daily. Apply for 2 weeks, then take a break for 2 weeks. Repeat as needed., Disp: 45 g, Rfl: 1    Past Medical History:   Diagnosis Date    Cataract     OU    Coronary artery disease involving native coronary artery of native heart without angina pectoris 4/7/2020    Hypertension associated with diabetes 12/8/2015    Ischemic cardiomyopathy     Mixed hyperlipidemia     Multiple pulmonary nodules 3/26/2020    NSTEMI (non-ST elevated myocardial infarction) 3/26/2020    Squamous cell carcinoma of skin     right scalp and mid nasal bridge     Past Surgical History:   Procedure Laterality Date    CARDIAC  CATHETERIZATION  04/02/2020    [place]:  Dr Viera    CORONARY ARTERY BYPASS GRAFT  06/10/2020    HYSTERECTOMY      SKIN BIOPSY       Family History   Problem Relation Age of Onset    No Known Problems Mother     No Known Problems Father     Skin cancer Neg Hx     Melanoma Neg Hx      Social History     Socioeconomic History    Marital status:      Spouse name: Not on file    Number of children: Not on file    Years of education: Not on file    Highest education level: Not on file   Occupational History    Not on file   Social Needs    Financial resource strain: Not on file    Food insecurity     Worry: Not on file     Inability: Not on file    Transportation needs     Medical: Not on file     Non-medical: Not on file   Tobacco Use    Smoking status: Never Smoker    Smokeless tobacco: Never Used   Substance and Sexual Activity    Alcohol use: No    Drug use: Never    Sexual activity: Not Currently     Partners: Male     Birth control/protection: See Surgical Hx   Lifestyle    Physical activity     Days per week: Not on file     Minutes per session: Not on file    Stress: Not on file   Relationships    Social connections     Talks on phone: Not on file     Gets together: Not on file     Attends Tenriism service: Not on file     Active member of club or organization: Not on file     Attends meetings of clubs or organizations: Not on file     Relationship status: Not on file   Other Topics Concern    Are you pregnant or think you may be? Not Asked    Breast-feeding Not Asked   Social History Narrative    Not on file      Review of Systems   Constitutional: Negative for chills and fever.   HENT: Negative for congestion.    Eyes: Negative for visual disturbance.   Respiratory: Negative for cough, shortness of breath and wheezing.    Cardiovascular: Negative for chest pain and palpitations.   Gastrointestinal: Negative for abdominal pain and nausea.   Endocrine: Negative for cold  "intolerance and heat intolerance.   Genitourinary: Negative for dysuria.   Musculoskeletal: Negative for back pain and myalgias.   Skin: Negative for rash.   Allergic/Immunologic: Negative for environmental allergies.   Neurological: Negative for dizziness, syncope and headaches.   Hematological: Does not bruise/bleed easily.   Psychiatric/Behavioral: Negative for dysphoric mood and sleep disturbance.         Objective:     Body mass index is 22.11 kg/m².  BP (!) 118/58   Pulse 68   Temp 98.1 °F (36.7 °C)   Ht 5' 1" (1.549 m)   Wt 53.1 kg (117 lb)   LMP 11/14/1986   SpO2 99%   BMI 22.11 kg/m²       Physical Exam  Vitals signs and nursing note reviewed.   Constitutional:       General: She is not in acute distress.     Appearance: She is well-developed. She is not diaphoretic.      Comments: Here with    HENT:      Head: Normocephalic.   Eyes:      Conjunctiva/sclera: Conjunctivae normal.   Neck:      Musculoskeletal: Normal range of motion and neck supple.   Cardiovascular:      Rate and Rhythm: Normal rate and regular rhythm.      Pulses:           Dorsalis pedis pulses are 2+ on the right side and 2+ on the left side.        Posterior tibial pulses are 2+ on the right side and 2+ on the left side.      Heart sounds: Normal heart sounds.      Comments: Well healed midsternal CABG scar  Pulmonary:      Effort: Pulmonary effort is normal.      Breath sounds: Normal breath sounds. No wheezing.   Abdominal:      General: Bowel sounds are normal.      Palpations: Abdomen is soft.   Musculoskeletal:      Right lower leg: No edema.      Left lower leg: No edema.      Right foot: Normal range of motion. No deformity.      Left foot: Normal range of motion. No deformity.   Feet:      Right foot:      Protective Sensation: 10 sites tested. 10 sites sensed.      Skin integrity: No ulcer, blister, skin breakdown, erythema, warmth, callus or dry skin.      Left foot:      Protective Sensation: 10 sites tested. 10 " sites sensed.      Skin integrity: No ulcer, blister, skin breakdown, erythema, warmth, callus or dry skin.   Lymphadenopathy:      Cervical: No cervical adenopathy.   Skin:     General: Skin is warm and dry.      Capillary Refill: Capillary refill takes 2 to 3 seconds.      Comments: Scar from left SVG    Neurological:      Mental Status: She is alert and oriented to person, place, and time.      Sensory: No sensory deficit.   Psychiatric:         Behavior: Behavior normal.         No visits with results within 6 Month(s) from this visit.   Latest known visit with results is:   Office Visit on 02/04/2020   Component Date Value Ref Range Status    Specimen UA 02/04/2020 Urine, Unspecified   Final    Color, UA 02/04/2020 Orange* Yellow, Straw, Lou Final    Appearance, UA 02/04/2020 Cloudy* Clear Final    pH, UA 02/04/2020 SEE COMMENT  5.0 - 8.0 Final    Color may interfere with results    Specific [place], UA 02/04/2020 SEE COMMENT  1.005 - 1.030 Final    Color may interfere with results    Protein, UA 02/04/2020 SEE COMMENT  Negative Final    Comment: Recommend a 24 hour urine protein or a urine   protein/creatinine ratio if globulin induced proteinuria is  clinically suspected.  Color may interfere with results      Glucose, UA 02/04/2020 SEE COMMENT  Negative Final    Color may interfere with results    Ketones, UA 02/04/2020 SEE COMMENT  Negative Final    Color may interfere with results    Bilirubin (UA) 02/04/2020 SEE COMMENT  Negative Final    Color may interfere with results    Occult Blood UA 02/04/2020 SEE COMMENT  Negative Final    Color may interfere with results    Nitrite, UA 02/04/2020 SEE COMMENT  Negative Final    Color may interfere with results    Leukocytes, UA 02/04/2020 SEE COMMENT  Negative Final    Color may interfere with results    RBC, UA 02/04/2020 3  0 - 4 /hpf Final    WBC, UA 02/04/2020 20* 0 - 5 /hpf Final    Bacteria 02/04/2020 Few* None-Occ /hpf Final    Squam  [place], UA 02/04/2020 4  /hpf Final    Amorphous, UA 02/04/2020 Moderate  None-Moderate Final    Microscopic Comment 02/04/2020 SEE COMMENT   Final    Microscopic done on unspun urine due to small specimen size.     No results found in the last 24 hours.   Assessment:       ICD-10-CM ICD-9-CM   1. Type 2 diabetes mellitus without complication, without long-term current use of insulin  E11.9 250.00   2. Well woman exam  Z01.419 V72.31   3. Long term current use of diuretic  Z79.899 V58.69   4. Multiple pulmonary nodules  R91.8 793.19   5. Solitary pulmonary nodule  R91.1 793.11   6. Hypertension associated with diabetes  E11.59 250.80    I10 401.9   7. Coronary artery disease involving native coronary artery of native heart without angina pectoris  I25.10 414.01   8. Status post aorto-coronary artery bypass graft  Z95.1 V45.81   9. Hyperlipidemia associated with type 2 diabetes mellitus  E11.69 250.80    E78.5 272.4   10. Post-menopause on HRT (hormone replacement therapy)  Z79.890 V07.4   11. Aspirin long-term use  Z79.82 V58.66   12. Antiplatelet or antithrombotic long-term use  Z79.02 V58.63   13. BMI 22.0-22.9, adult  Z68.22 V85.1   14. Ischemic cardiomyopathy  I25.5 414.8   15. Chronic systolic heart failure  I50.22 428.22       Plan:     #NIDDM2  -ha1c peak 10.9% in 9/2016 --> 7.3% 7/2019 --> 8% 1/30/20  -Eye exam 4/1/19. Macular Degeneration. No retinopathy. Dr. Jacobs. Advised updated eye exam needed.  -1/30/20: cr 0.8, GFR >60, LDL 97, tri 265, micro/cr ratio 7.8  -metformin caused GI upset. Previously on glipizide & januvia 100 mg..  -Continue jardiance 10 mg daily  -Continue crestor 20 mg daily & asa 81 mg daily  -repeat labs ordered  -foot exam done today, 9/9/20.    Diabetes Management Status    Statin: Taking (crestor 20 mg daily)  ACE/ARB: Not taking ( no m/c ratio)    Screening or Prevention Patient's value Goal Complete/Controlled?   HgA1C Testing and Control   Lab Results   Component Value Date     HGBA1C 6.3 (H) 09/11/2020      Annually/Less than 8% Yes   Lipid profile : 09/11/2020 Annually Yes   LDL control Lab Results   Component Value Date    LDLCALC 60.4 (L) 09/11/2020    Annually/Less than 100 mg/dl  Yes   Nephropathy screening Lab Results   Component Value Date    LABMICR 5.0 09/11/2020     Lab Results   Component Value Date    PROTEINUA SEE COMMENT 02/04/2020    Annually Yes   Blood pressure BP Readings from Last 1 Encounters:   09/09/20 (!) 118/58    Less than 140/90 Yes   Dilated retinal exam : 04/01/2019 Annually No   Foot exam   : 09/09/2020 Annually Yes     #CAD s/p 4V CABG  #iCM, HFrEF  -CABG  6/10/20- Dr. Lguo. (left internal mammary artery to   left anterior descending artery, saphenous vein graft to the right coronary artery, saphenous vein graft to the obtuse marginal branch, and saphenous vein graft to the third diagonal branch.  -Follows with Dr. Viera (last seen 7/30/20). Plan for repeat TTE Oct.  -Continue Lasix 20 mg daily + ASA 81 mg daily + Crestor 20 mg daily + Plavis 75 mg daily + Coq10 + Fish Oil + Toprol  mg daily  -TTE 6/2/20: EF 45-50%, normal systolic function, distal anteroapical dyskinesis, moderate aortic sclerosis.    #Mutliple pulmonary nodules  -noted on CTA 3/2020. Recommended to repeat CT lung 6 months then 18 months.   -CT chest ordered    #HTN  -previously on losartan. BP now at goal.   -continue toprol xl 100 mg daily    #HLD with DM  -1/30/20: chol 191, hdl 41, ldl 97, tri 265  -continue fish oil and crestor 20 mg daily (increased from 5 mg)  Lab Results   Component Value Date    CHOL 133 09/11/2020    TRIG 88 09/11/2020    HDL 55 09/11/2020    LDLCALC 60.4 (L) 09/11/2020     #Postmenopausal HRT  -currently on Vivelle Dot BIW.   -advised against HRT with cardiac disease  -referred to dr. Lashon mccarty to establish care and discuss    #BMI 26.04 --> 22.12  Body mass index is 22.11 kg/m².  Wt Readings from Last 1 Encounters:   09/30/20 1340 53.1 kg (117 lb 1  oz)   -3/2020: 135 --> 6/2020: 121 --> 9/9: 117  -noted. Discussed heart healthy diet with carb restriction.     #HCM  -Influenza needed when available.  -TDAP & shingrix deferred due to cost  -Prevnar 12/7/15. Pneumovax 8/6/18  -Mammogram no longer indicated >74 y/o  -DEXA 2/13/20: normal. Repeat in 2 years.  -Colonoscopy deferred given age.     Has mychart. Labs ordered. Follow up in 3 months.     Mary Ann Gerard M.D.    Orders Placed This Encounter   Procedures    CT Chest Without Contrast    Ambulatory referral/consult to Obstetrics / Gynecology      Medications Ordered This Encounter   Medications    co-enzyme Q-10 50 mg capsule     Sig: Take 2 capsules (100 mg total) by mouth once daily.     Dispense:       multivitamin capsule     Sig: Take 1 capsule by mouth once daily.     Dispense:       omega-3 fatty acids/fish oil (FISH OIL-OMEGA-3 FATTY ACIDS) 300-1,000 mg capsule     Sig: Take 1 capsule by mouth once daily.     Dispense:

## 2020-09-09 NOTE — Clinical Note
Please move appointment with me to 1 week AFTER labs in December. Confirm if she got flu shot and eye exam.

## 2020-09-11 ENCOUNTER — LAB VISIT (OUTPATIENT)
Dept: LAB | Facility: HOSPITAL | Age: 75
End: 2020-09-11
Attending: INTERNAL MEDICINE
Payer: MEDICARE

## 2020-09-11 DIAGNOSIS — E11.65 TYPE 2 DIABETES MELLITUS WITH HYPERGLYCEMIA, WITHOUT LONG-TERM CURRENT USE OF INSULIN: Chronic | ICD-10-CM

## 2020-09-11 DIAGNOSIS — Z13.29 SCREENING FOR THYROID DISORDER: ICD-10-CM

## 2020-09-11 DIAGNOSIS — Z13.21 ENCOUNTER FOR VITAMIN DEFICIENCY SCREENING: ICD-10-CM

## 2020-09-11 DIAGNOSIS — E11.59 HYPERTENSION ASSOCIATED WITH DIABETES: Chronic | ICD-10-CM

## 2020-09-11 DIAGNOSIS — I25.5 ISCHEMIC CARDIOMYOPATHY: ICD-10-CM

## 2020-09-11 DIAGNOSIS — E78.1 TYPE 2 DIABETES MELLITUS WITH HYPERTRIGLYCERIDEMIA: Chronic | ICD-10-CM

## 2020-09-11 DIAGNOSIS — E11.69 TYPE 2 DIABETES MELLITUS WITH HYPERTRIGLYCERIDEMIA: Chronic | ICD-10-CM

## 2020-09-11 DIAGNOSIS — I25.10 MULTIPLE VESSEL CORONARY ARTERY DISEASE: ICD-10-CM

## 2020-09-11 DIAGNOSIS — Z13.89 SCREENING FOR HEMATURIA OR PROTEINURIA: ICD-10-CM

## 2020-09-11 DIAGNOSIS — Z13.0 SCREENING FOR DEFICIENCY ANEMIA: ICD-10-CM

## 2020-09-11 DIAGNOSIS — I25.708 CORONARY ARTERY DISEASE OF BYPASS GRAFT OF NATIVE HEART WITH STABLE ANGINA PECTORIS: ICD-10-CM

## 2020-09-11 DIAGNOSIS — I15.2 HYPERTENSION ASSOCIATED WITH DIABETES: Chronic | ICD-10-CM

## 2020-09-11 DIAGNOSIS — Z11.59 NEED FOR HEPATITIS C SCREENING TEST: ICD-10-CM

## 2020-09-11 DIAGNOSIS — Z79.899 ENCOUNTER FOR LONG-TERM CURRENT USE OF MEDICATION: ICD-10-CM

## 2020-09-11 LAB
ALBUMIN SERPL BCP-MCNC: 3.8 G/DL (ref 3.5–5.2)
ALP SERPL-CCNC: 52 U/L (ref 55–135)
ALT SERPL W/O P-5'-P-CCNC: 33 U/L (ref 10–44)
ANION GAP SERPL CALC-SCNC: 14 MMOL/L (ref 8–16)
AST SERPL-CCNC: 36 U/L (ref 10–40)
BASOPHILS # BLD AUTO: 0.03 K/UL (ref 0–0.2)
BASOPHILS NFR BLD: 0.6 % (ref 0–1.9)
BILIRUB SERPL-MCNC: 0.3 MG/DL (ref 0.1–1)
BUN SERPL-MCNC: 14 MG/DL (ref 8–23)
CALCIUM SERPL-MCNC: 9.4 MG/DL (ref 8.7–10.5)
CHLORIDE SERPL-SCNC: 106 MMOL/L (ref 95–110)
CHOLEST SERPL-MCNC: 133 MG/DL (ref 120–199)
CHOLEST/HDLC SERPL: 2.4 {RATIO} (ref 2–5)
CO2 SERPL-SCNC: 22 MMOL/L (ref 23–29)
CREAT SERPL-MCNC: 0.6 MG/DL (ref 0.5–1.4)
DIFFERENTIAL METHOD: ABNORMAL
EOSINOPHIL # BLD AUTO: 0.2 K/UL (ref 0–0.5)
EOSINOPHIL NFR BLD: 3.6 % (ref 0–8)
ERYTHROCYTE [DISTWIDTH] IN BLOOD BY AUTOMATED COUNT: 14.6 % (ref 11.5–14.5)
EST. GFR  (AFRICAN AMERICAN): >60 ML/MIN/1.73 M^2
EST. GFR  (NON AFRICAN AMERICAN): >60 ML/MIN/1.73 M^2
GLUCOSE SERPL-MCNC: 104 MG/DL (ref 70–110)
HCT VFR BLD AUTO: 43.5 % (ref 37–48.5)
HDLC SERPL-MCNC: 55 MG/DL (ref 40–75)
HDLC SERPL: 41.4 % (ref 20–50)
HGB BLD-MCNC: 12.9 G/DL (ref 12–16)
IMM GRANULOCYTES # BLD AUTO: 0.02 K/UL (ref 0–0.04)
IMM GRANULOCYTES NFR BLD AUTO: 0.4 % (ref 0–0.5)
LDLC SERPL CALC-MCNC: 60.4 MG/DL (ref 63–159)
LYMPHOCYTES # BLD AUTO: 2 K/UL (ref 1–4.8)
LYMPHOCYTES NFR BLD: 38.4 % (ref 18–48)
MAGNESIUM SERPL-MCNC: 1.9 MG/DL (ref 1.6–2.6)
MCH RBC QN AUTO: 28.9 PG (ref 27–31)
MCHC RBC AUTO-ENTMCNC: 29.7 G/DL (ref 32–36)
MCV RBC AUTO: 97 FL (ref 82–98)
MONOCYTES # BLD AUTO: 0.4 K/UL (ref 0.3–1)
MONOCYTES NFR BLD: 7.8 % (ref 4–15)
NEUTROPHILS # BLD AUTO: 2.6 K/UL (ref 1.8–7.7)
NEUTROPHILS NFR BLD: 49.2 % (ref 38–73)
NONHDLC SERPL-MCNC: 78 MG/DL
NRBC BLD-RTO: 0 /100 WBC
PLATELET # BLD AUTO: 213 K/UL (ref 150–350)
PMV BLD AUTO: 10.2 FL (ref 9.2–12.9)
POTASSIUM SERPL-SCNC: 3.7 MMOL/L (ref 3.5–5.1)
PROT SERPL-MCNC: 6.9 G/DL (ref 6–8.4)
RBC # BLD AUTO: 4.47 M/UL (ref 4–5.4)
SODIUM SERPL-SCNC: 142 MMOL/L (ref 136–145)
TRIGL SERPL-MCNC: 88 MG/DL (ref 30–150)
TSH SERPL DL<=0.005 MIU/L-ACNC: 2.2 UIU/ML (ref 0.4–4)
WBC # BLD AUTO: 5.28 K/UL (ref 3.9–12.7)

## 2020-09-11 PROCEDURE — 80053 COMPREHEN METABOLIC PANEL: CPT | Mod: HCNC

## 2020-09-11 PROCEDURE — 36415 COLL VENOUS BLD VENIPUNCTURE: CPT | Mod: HCNC,PO

## 2020-09-11 PROCEDURE — 84443 ASSAY THYROID STIM HORMONE: CPT | Mod: HCNC

## 2020-09-11 PROCEDURE — 83921 ORGANIC ACID SINGLE QUANT: CPT | Mod: HCNC

## 2020-09-11 PROCEDURE — 82607 VITAMIN B-12: CPT | Mod: HCNC

## 2020-09-11 PROCEDURE — 83036 HEMOGLOBIN GLYCOSYLATED A1C: CPT | Mod: HCNC

## 2020-09-11 PROCEDURE — 82306 VITAMIN D 25 HYDROXY: CPT | Mod: HCNC

## 2020-09-11 PROCEDURE — 80061 LIPID PANEL: CPT | Mod: HCNC

## 2020-09-11 PROCEDURE — 83880 ASSAY OF NATRIURETIC PEPTIDE: CPT | Mod: HCNC

## 2020-09-11 PROCEDURE — 83735 ASSAY OF MAGNESIUM: CPT | Mod: HCNC

## 2020-09-11 PROCEDURE — 85025 COMPLETE CBC W/AUTO DIFF WBC: CPT | Mod: HCNC

## 2020-09-11 PROCEDURE — 86803 HEPATITIS C AB TEST: CPT | Mod: HCNC

## 2020-09-12 LAB
25(OH)D3+25(OH)D2 SERPL-MCNC: 32 NG/ML (ref 30–96)
BNP SERPL-MCNC: 94 PG/ML (ref 0–99)
ESTIMATED AVG GLUCOSE: 134 MG/DL (ref 68–131)
HBA1C MFR BLD HPLC: 6.3 % (ref 4–5.6)
VIT B12 SERPL-MCNC: 292 PG/ML (ref 210–950)

## 2020-09-14 DIAGNOSIS — E11.65 TYPE 2 DIABETES MELLITUS WITH HYPERGLYCEMIA, WITHOUT LONG-TERM CURRENT USE OF INSULIN: Chronic | ICD-10-CM

## 2020-09-14 LAB — HCV AB SERPL QL IA: NEGATIVE

## 2020-09-14 RX ORDER — EMPAGLIFLOZIN 10 MG/1
TABLET, FILM COATED ORAL
Qty: 30 TABLET | Refills: 3 | Status: SHIPPED | OUTPATIENT
Start: 2020-09-14 | End: 2020-12-08

## 2020-09-14 RX ORDER — EMPAGLIFLOZIN 10 MG/1
10 TABLET, FILM COATED ORAL DAILY
Qty: 30 TABLET | Refills: 2 | OUTPATIENT
Start: 2020-09-14

## 2020-09-14 NOTE — TELEPHONE ENCOUNTER
----- Message from Sarika Moncada sent at 9/14/2020 10:04 AM CDT -----  Type:  RX Refill Request    Who Called:  Pt  Saumya   Refill or New Rx:   New RX (no more refills)   RX Name and Strength:   Jardiance 10mg tab  How is the patient currently taking it? (ex. 1XDay):  Take once daily   Is this a 30 day or 90 day RX:  30 day   Preferred Pharmacy with phone number:  [facility] in [place]  Local or Mail Order: Local   Ordering Provider:  Dr Gerard   Would the patient rather a call back or a response via [facility]?   Call back   Best Call Back Number:    [phone]  Additional Information:  please call when sent in//mary/kip

## 2020-09-15 LAB — METHYLMALONATE SERPL-SCNC: 0.2 UMOL/L

## 2020-09-17 DIAGNOSIS — E11.9 TYPE 2 DIABETES MELLITUS WITHOUT COMPLICATION, WITHOUT LONG-TERM CURRENT USE OF INSULIN: Chronic | ICD-10-CM

## 2020-09-17 DIAGNOSIS — E11.69 HYPERLIPIDEMIA ASSOCIATED WITH TYPE 2 DIABETES MELLITUS: Primary | Chronic | ICD-10-CM

## 2020-09-17 DIAGNOSIS — I15.2 HYPERTENSION ASSOCIATED WITH DIABETES: Chronic | ICD-10-CM

## 2020-09-17 DIAGNOSIS — E78.5 HYPERLIPIDEMIA ASSOCIATED WITH TYPE 2 DIABETES MELLITUS: Primary | Chronic | ICD-10-CM

## 2020-09-17 DIAGNOSIS — E11.59 HYPERTENSION ASSOCIATED WITH DIABETES: Chronic | ICD-10-CM

## 2020-09-17 PROBLEM — Z95.1 STATUS POST AORTO-CORONARY ARTERY BYPASS GRAFT: Chronic | Status: ACTIVE | Noted: 2020-06-12

## 2020-09-17 PROBLEM — Z79.82 ASPIRIN LONG-TERM USE: Chronic | Status: ACTIVE | Noted: 2020-09-17

## 2020-09-17 PROBLEM — Z79.899 LONG TERM CURRENT USE OF DIURETIC: Chronic | Status: ACTIVE | Noted: 2020-09-17

## 2020-09-17 PROBLEM — R91.1 PULMONARY NODULE: Chronic | Status: ACTIVE | Noted: 2020-03-26

## 2020-09-17 PROBLEM — Z79.02 ANTIPLATELET OR ANTITHROMBOTIC LONG-TERM USE: Chronic | Status: ACTIVE | Noted: 2020-09-17

## 2020-09-17 PROBLEM — I25.10 CORONARY ARTERY DISEASE INVOLVING NATIVE CORONARY ARTERY OF NATIVE HEART WITHOUT ANGINA PECTORIS: Chronic | Status: ACTIVE | Noted: 2020-04-07

## 2020-09-17 PROBLEM — R91.8 MULTIPLE PULMONARY NODULES: Chronic | Status: ACTIVE | Noted: 2020-03-26

## 2020-09-17 PROBLEM — Z79.890 POST-MENOPAUSE ON HRT (HORMONE REPLACEMENT THERAPY): Chronic | Status: ACTIVE | Noted: 2020-09-17

## 2020-09-29 ENCOUNTER — PATIENT MESSAGE (OUTPATIENT)
Dept: OTHER | Facility: OTHER | Age: 75
End: 2020-09-29

## 2020-09-29 ENCOUNTER — PATIENT OUTREACH (OUTPATIENT)
Dept: ADMINISTRATIVE | Facility: OTHER | Age: 75
End: 2020-09-29

## 2020-09-29 NOTE — PROGRESS NOTES
Health Maintenance Due   Topic Date Due    Eye Exam  04/01/2020    Influenza Vaccine (1) 08/01/2020                                                                                                                                                                                                                                           Health Maintenance Due   Topic Date Due    Eye Exam  04/01/2020    Influenza Vaccine (1) 08/01/2020     Updates were requested from care everywhere.  Chart was reviewed for overdue [facility] Encounters ([facility]) topics (CRS, Breast Cancer Screening, Eye exam)  Health Maintenance has been updated.  LINKS immunization registry triggered.  Immunizations were reconciled.                                      Updates were requested from care everywhere.  Chart was reviewed for overdue [facility]active [facility] Encounters ([facility]) topics (CRS, Breast Cancer Screening, Eye exam)  Health Maintenance has been updated.  LINKS immunization registry triggered.  Immunizations were reconciled.

## 2020-09-30 ENCOUNTER — OFFICE VISIT (OUTPATIENT)
Dept: DERMATOLOGY | Facility: CLINIC | Age: 75
End: 2020-09-30
Payer: MEDICARE

## 2020-09-30 VITALS — BODY MASS INDEX: 22.1 KG/M2 | WEIGHT: 117.06 LBS | HEIGHT: 61 IN | RESPIRATION RATE: 18 BRPM

## 2020-09-30 DIAGNOSIS — D22.9 MULTIPLE BENIGN NEVI: ICD-10-CM

## 2020-09-30 DIAGNOSIS — L30.9 ECZEMA, UNSPECIFIED TYPE: ICD-10-CM

## 2020-09-30 DIAGNOSIS — L57.0 AK (ACTINIC KERATOSIS): ICD-10-CM

## 2020-09-30 DIAGNOSIS — L81.4 LENTIGINES: ICD-10-CM

## 2020-09-30 DIAGNOSIS — L82.1 SEBORRHEIC KERATOSES: ICD-10-CM

## 2020-09-30 DIAGNOSIS — Z85.828 HISTORY OF SCC (SQUAMOUS CELL CARCINOMA) OF SKIN: ICD-10-CM

## 2020-09-30 DIAGNOSIS — Z12.83 SCREENING EXAM FOR SKIN CANCER: ICD-10-CM

## 2020-09-30 DIAGNOSIS — D18.01 ANGIOMA OF SKIN: ICD-10-CM

## 2020-09-30 DIAGNOSIS — D48.9 NEOPLASM OF UNCERTAIN BEHAVIOR: Primary | ICD-10-CM

## 2020-09-30 PROCEDURE — 88305 TISSUE EXAM BY PATHOLOGIST: ICD-10-PCS | Mod: 26,HCNC,, | Performed by: PATHOLOGY

## 2020-09-30 PROCEDURE — 17003 DESTRUCT PREMALG LES 2-14: CPT | Mod: HCNC,S$GLB,, | Performed by: DERMATOLOGY

## 2020-09-30 PROCEDURE — 17000 DESTRUCT PREMALG LESION: CPT | Mod: HCNC,S$GLB,, | Performed by: DERMATOLOGY

## 2020-09-30 PROCEDURE — 99999 PR PBB SHADOW E&M-EST. PATIENT-LVL IV: ICD-10-PCS | Mod: PBBFAC,HCNC,, | Performed by: DERMATOLOGY

## 2020-09-30 PROCEDURE — 88305 TISSUE EXAM BY PATHOLOGIST: CPT | Mod: 26,HCNC,, | Performed by: PATHOLOGY

## 2020-09-30 PROCEDURE — 11102 TANGNTL BX SKIN SINGLE LES: CPT | Mod: 59,HCNC,S$GLB, | Performed by: DERMATOLOGY

## 2020-09-30 PROCEDURE — 17003 DESTRUCTION, PREMALIGNANT LESIONS; SECOND THROUGH 14 LESIONS: ICD-10-PCS | Mod: HCNC,S$GLB,, | Performed by: DERMATOLOGY

## 2020-09-30 PROCEDURE — 99214 OFFICE O/P EST MOD 30 MIN: CPT | Mod: 25,HCNC,S$GLB, | Performed by: DERMATOLOGY

## 2020-09-30 PROCEDURE — 11102 PR TANGENTIAL BIOPSY, SKIN, SINGLE LESION: ICD-10-PCS | Mod: 59,HCNC,S$GLB, | Performed by: DERMATOLOGY

## 2020-09-30 PROCEDURE — 1159F MED LIST DOCD IN RCRD: CPT | Mod: HCNC,S$GLB,, | Performed by: DERMATOLOGY

## 2020-09-30 PROCEDURE — 99214 PR OFFICE/OUTPT VISIT, EST, LEVL IV, 30-39 MIN: ICD-10-PCS | Mod: 25,HCNC,S$GLB, | Performed by: DERMATOLOGY

## 2020-09-30 PROCEDURE — 17000 PR DESTRUCTION(LASER SURGERY,CRYOSURGERY,CHEMOSURGERY),PREMALIGNANT LESIONS,FIRST LESION: ICD-10-PCS | Mod: HCNC,S$GLB,, | Performed by: DERMATOLOGY

## 2020-09-30 PROCEDURE — 99999 PR PBB SHADOW E&M-EST. PATIENT-LVL IV: CPT | Mod: PBBFAC,HCNC,, | Performed by: DERMATOLOGY

## 2020-09-30 PROCEDURE — 88305 TISSUE EXAM BY PATHOLOGIST: CPT | Mod: HCNC | Performed by: PATHOLOGY

## 2020-09-30 PROCEDURE — 1101F PR PT FALLS ASSESS DOC 0-1 FALLS W/OUT INJ PAST YR: ICD-10-PCS | Mod: HCNC,CPTII,S$GLB, | Performed by: DERMATOLOGY

## 2020-09-30 PROCEDURE — 11103 PR TANGENTIAL BIOPSY, SKIN, EA ADDTL LESION: ICD-10-PCS | Mod: 59,HCNC,S$GLB, | Performed by: DERMATOLOGY

## 2020-09-30 PROCEDURE — 11103 TANGNTL BX SKIN EA SEP/ADDL: CPT | Mod: 59,HCNC,S$GLB, | Performed by: DERMATOLOGY

## 2020-09-30 PROCEDURE — 1101F PT FALLS ASSESS-DOCD LE1/YR: CPT | Mod: HCNC,CPTII,S$GLB, | Performed by: DERMATOLOGY

## 2020-09-30 PROCEDURE — 1159F PR MEDICATION LIST DOCUMENTED IN MEDICAL RECORD: ICD-10-PCS | Mod: HCNC,S$GLB,, | Performed by: DERMATOLOGY

## 2020-09-30 RX ORDER — TRIAMCINOLONE ACETONIDE 1 MG/G
CREAM TOPICAL 2 TIMES DAILY
Qty: 45 G | Refills: 1 | Status: SHIPPED | OUTPATIENT
Start: 2020-09-30 | End: 2020-12-16

## 2020-09-30 NOTE — PROGRESS NOTES
Subjective:       Patient ID:  Saumya Braun is a 75 y.o. female who presents for   Chief Complaint   Patient presents with    Skin Check     Patient present for initial visit w/ provider, last seen on 10/9/2017 by Dr. Becerra.  Pt would like a [facility]    The patient denies any lesions at present that are changing in color, increasing in size, painful, itching, or bleeding.     This is a high risk patient here to check for the development of new lesions.    Negative  pacemaker, defibulator     yes Phx of NMSC.  +multiple SCC  no Fhx of melanoma.    Past Medical History:  No date: Cataract      Comment:  OU  4/7/2020: Coronary artery disease involving native coronary artery of   native heart without angina pectoris  12/8/2015: Hypertension associated with diabetes  No date: Ischemic cardiomyopathy  No date: Mixed hyperlipidemia  3/26/2020: Multiple pulmonary nodules  4/2/2020: Multiple vessel coronary artery disease  3/26/2020: NSTEMI (non-ST elevated myocardial infarction)  No date: Squamous cell carcinoma of skin      Comment:  right scalp and mid nasal bridge        Review of Systems   Constitutional: Negative for fever, chills, weight loss, fatigue, night sweats and malaise.   HENT: Negative for headaches.    Respiratory: Negative for cough and shortness of breath.    Gastrointestinal: Negative for indigestion.   Skin: Positive for activity-related sunscreen use. Negative for daily sunscreen use, recent sunburn and wears hat.   Neurological: Negative for headaches.   Hematologic/Lymphatic: Negative for adenopathy. Bruises/bleeds easily.        Objective:    Physical Exam   Constitutional: She appears well-developed and well-nourished. No distress.   Neurological: She is alert and oriented to person, place, and time. She is not disoriented.   Psychiatric: She has a normal mood and affect.   Skin:   Areas Examined (abnormalities noted in diagram):   Scalp / Hair Palpated and Inspected  Head / Face Inspection  Performed  Neck Inspection Performed  Chest / Axilla Inspection Performed  Abdomen Inspection Performed  Back Inspection Performed  RUE Inspected  LUE Inspection Performed  RLE Inspected  LLE Inspection Performed  Nails and Digits Inspection Performed                   Diagram Legend     Erythematous scaling macule/papule c/w actinic keratosis       Vascular papule c/w angioma      Pigmented verrucoid papule/plaque c/w seborrheic keratosis      Yellow umbilicated papule c/w sebaceous hyperplasia      Irregularly shaped tan macule c/w lentigo     1-2 mm smooth white papules consistent with Milia      Movable subcutaneous cyst with punctum c/w epidermal inclusion cyst      Subcutaneous movable cyst c/w pilar cyst      Firm pink to brown papule c/w dermatofibroma      Pedunculated fleshy papule(s) c/w skin tag(s)      Evenly pigmented macule c/w junctional nevus     Mildly variegated pigmented, slightly irregular-bordered macule c/w mildly atypical nevus      Flesh colored to evenly pigmented papule c/w intradermal nevus       Pink pearly papule/plaque c/w basal cell carcinoma      Erythematous hyperkeratotic cursted plaque c/w SCC      Surgical scar with no sign of skin cancer recurrence      Open and closed comedones      Inflammatory papules and pustules      Verrucoid papule consistent consistent with wart     Erythematous eczematous patches and plaques     Dystrophic onycholytic nail with subungual debris c/w onychomycosis     Umbilicated papule    Erythematous-base heme-crusted tan verrucoid plaque consistent with inflamed seborrheic keratosis     Erythematous Silvery Scaling Plaque c/w Psoriasis     See annotation                  Assessment / Plan:      Pathology Orders:     Normal Orders This Visit    Specimen to Pathology, Dermatology     Questions:    Procedure Type: Dermatology and skin neoplasms    Number of Specimens: 2    ------------------------: -------------------------    Spec 1 Procedure: Biopsy     Spec 1 Clinical Impression: BCC    Spec 1 Source: Left nasal Supra tip    ------------------------: -------------------------    Spec 2 Procedure: Biopsy    Spec 2 Clinical Impression: BCC    Spec 2 Source: Right Forearm        Neoplasm of uncertain behavior  -     Specimen to Pathology, Dermatology    Shave biopsy procedure note x2:    Shave biopsy performed after verbal consent including risk of infection, scar, recurrence, need for additional treatment of site. Area prepped with alcohol, anesthetized with approximately 1.0cc of 1% lidocaine with epinephrine. Lesional tissue shaved with razor blade. Hemostasis achieved with application of aluminum chloride followed by hyfrecation. No complications. Dressing applied. Wound care explained.      AK (actinic keratosis)  Premalignant nature discussed     Cryosurgery Procedure Note    Verbal consent from the patient is obtained including, but not limited to, risk of hypopigmentation/hyperpigmentation, scar, recurrence of lesion. The patient is aware of the precancerous quality and need for treatment of these lesions. Liquid nitrogen cryosurgery is applied to the 6 actinic keratoses, as detailed in the physical exam, to produce a freeze injury. The patient is aware that blisters may form and is instructed on wound care with gentle cleansing and use of vaseline ointment to keep moist until healed. The patient is supplied a handout on cryosurgery and is instructed to call if lesions do not completely resolve.      History of SCC (squamous cell carcinoma) of skin  -     Ambulatory referral/consult to Dermatology    Screening exam for skin cancer  Area of previous NMSC examined. Site well healed with no signs of recurrence.    Total body skin examination performed today including at least 12 points as noted in physical examination. Suspicious lesions noted.      Seborrheic keratoses  These are benign inherited growths without a malignant potential. Reassurance given to  patient. No treatment is necessary.       Multiple benign nevi  total body skin examination performed today including at least 12 points as noted in physical examination. No lesions suspicious for malignancy noted.  Reassurance provided.  Instructed patient to observe lesion(s) for changes and follow up in clinic if changes are noted. Discussed ABCDE's of moles and brochure provided.      Lentigines  This is a benign hyperpigmented sun induced lesion. Daily sun protection will reduce the number of new lesions. Treatment of these benign lesions are considered cosmetic.      Angioma of skin  This is a benign vascular lesion. Reassurance given. No treatment required.       Eczema,  On left lateral knee  -     triamcinolone acetonide 0.1% (KENALOG) 0.1 % cream; Apply topically 2 (two) times daily. Apply for 2 weeks, then take a break for 2 weeks. Repeat as needed.  Dispense: 45 g; Refill: 1      - I prescribed TMC 0.1% to be applied twice daily to affected areas for two weeks and then tapered to weekends only.   - Side effects of topical steroids were reviewed with the patient including skin atrophy, striae, telangectasias and tachyphylaxis.   - RTC to evaluation for resolution. If no improvement, consider Bx             Follow up in about 2 months (around 11/30/2020) for for L lateral knee rash.     ADDENDUM:   1.  Skin, left nasal supratip, shave biopsy:   - BASAL CELL CARCINOMA.   - THE TUMOR EXTENDS TO THE DEEP AND LATERAL BIOPSY MARGINS.   MICROSCOPIC DESCRIPTION: Sections show a basaloid tumor within the dermis   exhibiting peripheral palisading, retraction artifact and apoptosis.   2.  Skin, right forearm, shave biopsy:   - BASAL CELL CARCINOMA WITH MIXED SUPERFICIAL AND NODULAR GROWTH PATTERN.   - MARGINS ARE NEGATIVE IN THE PLANES OF SECTION.   MICROSCOPIC DESCRIPTION: Sections show multiple foci of basaloid cells, some   of them in apoptosis, arising along the dermoepidermal junction and extending   into the  papillary dermis. Nodules of basal cell carcinoma are also noted.    Comment: Interp By Seven Lira M.D., Signed on 10/05/2020 at 14:47     Lesion 1: Mohs   Lesion 2: ED&C

## 2020-09-30 NOTE — LETTER
September 30, 2020      [place]  [address] [facility] [address]           [place] - D[facility] [address] [place] [address]  Phone: [phone]  Fax: [phone]          Patient: Saumya Braun   MR Number: [id number]   YOB: 1945   Date of Visit: 9/30/2020       Dear Zelda Tena:    Thank you for referring Saumya Braun to me for evaluation. Attached you will find relevant portions of my assessment and plan of care.    If you have questions, please do not hesitate to call me. I look forward to following Saumya Braun along with you.    Sincerely,    Chhaya Hagan MD    Enclosure  CC:  No Recipients    If you would like to receive this communication electronically, please contact [email or web address] or [phone] to request more information on [facility] Link access.    For providers and/or their staff who would like to refer a patient to [facility], please contact us through our one-stop-shop provider referral line, [facility], at [phone].    If you feel you have received this communication in error or would no longer like to receive these types of communications, please e-mail [email or web address]

## 2020-10-02 ENCOUNTER — TELEPHONE (OUTPATIENT)
Dept: FAMILY MEDICINE | Facility: CLINIC | Age: 75
End: 2020-10-02

## 2020-10-02 NOTE — TELEPHONE ENCOUNTER
Left message for patient to return call labs are printed, need to know if you want them mailed or put up front for patient to  and does patient know how to see labs on mychart.

## 2020-10-02 NOTE — TELEPHONE ENCOUNTER
----- Message from Joana Lopez sent at 10/2/2020  9:26 AM CDT -----  Regarding: labs  Contact: patient  Patient would like to have the most recent lab results mailed to her, please call when ready for her to  at [phone]

## 2020-10-05 ENCOUNTER — TELEPHONE (OUTPATIENT)
Dept: FAMILY MEDICINE | Facility: CLINIC | Age: 75
End: 2020-10-05

## 2020-10-05 LAB
FINAL PATHOLOGIC DIAGNOSIS: NORMAL
GROSS: NORMAL

## 2020-10-06 ENCOUNTER — TELEPHONE (OUTPATIENT)
Dept: FAMILY MEDICINE | Facility: CLINIC | Age: 75
End: 2020-10-06

## 2020-10-06 NOTE — TELEPHONE ENCOUNTER
----- Message from Jerri Vanessa sent at 10/6/2020  8:27 AM CDT -----  Regarding: request call back  Pt request call back pt will like to discuss something .. call back: [phone] (home)

## 2020-10-07 ENCOUNTER — TELEPHONE (OUTPATIENT)
Dept: DERMATOLOGY | Facility: CLINIC | Age: 75
End: 2020-10-07

## 2020-10-07 NOTE — TELEPHONE ENCOUNTER
Spoke w/ pt. Informed pt about results and recommendations per provider. pt verbalized understanding.    appt on 11/18/2020

## 2020-10-07 NOTE — TELEPHONE ENCOUNTER
----- Message from Chhaya Hagan MD sent at 10/7/2020  9:08 AM CDT -----  Please call patient and inform of NMSC diagnoses.   Lesion 1: Recommend patient to Dr. Elaine for Mohs surgery consultation. Picture in chart.  Lesion 2: Recommend ED&C. Please schedule in 6-8 wks. 15min okay       1.  Skin, left nasal supratip, shave biopsy:   - BASAL CELL CARCINOMA.   - THE TUMOR EXTENDS TO THE DEEP AND LATERAL BIOPSY MARGINS.   MICROSCOPIC DESCRIPTION: Sections show a basaloid tumor within the dermis   exhibiting peripheral palisading, retraction artifact and apoptosis.   2.  Skin, right forearm, shave biopsy:   - BASAL CELL CARCINOMA WITH MIXED SUPERFICIAL AND NODULAR GROWTH PATTERN.   - MARGINS ARE NEGATIVE IN THE PLANES OF SECTION.   MICROSCOPIC DESCRIPTION: Sections show multiple foci of basaloid cells, some   of them in apoptosis, arising along the dermoepidermal junction and extending   into the papillary dermis. Nodules of basal cell carcinoma are also noted.   Comment: Interp By Seven Lira M.D., Signed on 10/05/2020 at 14:47

## 2020-10-12 ENCOUNTER — OFFICE VISIT (OUTPATIENT)
Dept: OBSTETRICS AND GYNECOLOGY | Facility: CLINIC | Age: 75
End: 2020-10-12
Payer: MEDICARE

## 2020-10-12 VITALS
SYSTOLIC BLOOD PRESSURE: 110 MMHG | BODY MASS INDEX: 22.31 KG/M2 | DIASTOLIC BLOOD PRESSURE: 60 MMHG | HEIGHT: 61 IN | WEIGHT: 118.19 LBS

## 2020-10-12 DIAGNOSIS — R45.86 MOOD SWINGS: ICD-10-CM

## 2020-10-12 DIAGNOSIS — Z01.419 WELL WOMAN EXAM: Primary | ICD-10-CM

## 2020-10-12 DIAGNOSIS — N81.6 RECTOCELE: ICD-10-CM

## 2020-10-12 DIAGNOSIS — Z79.82 ASPIRIN LONG-TERM USE: ICD-10-CM

## 2020-10-12 DIAGNOSIS — Z95.1 HX OF CABG: ICD-10-CM

## 2020-10-12 DIAGNOSIS — R23.2 HOT FLASHES: ICD-10-CM

## 2020-10-12 PROCEDURE — 99999 PR PBB SHADOW E&M-EST. PATIENT-LVL IV: CPT | Mod: PBBFAC,HCNC,, | Performed by: OBSTETRICS & GYNECOLOGY

## 2020-10-12 PROCEDURE — G0101 PR CA SCREEN;PELVIC/BREAST EXAM: ICD-10-PCS | Mod: HCNC,S$GLB,, | Performed by: OBSTETRICS & GYNECOLOGY

## 2020-10-12 PROCEDURE — G0101 CA SCREEN;PELVIC/BREAST EXAM: HCPCS | Mod: HCNC,S$GLB,, | Performed by: OBSTETRICS & GYNECOLOGY

## 2020-10-12 PROCEDURE — 99999 PR PBB SHADOW E&M-EST. PATIENT-LVL IV: ICD-10-PCS | Mod: PBBFAC,HCNC,, | Performed by: OBSTETRICS & GYNECOLOGY

## 2020-10-12 RX ORDER — PAROXETINE 10 MG/1
10 TABLET, FILM COATED ORAL DAILY
Qty: 30 TABLET | Refills: 2 | Status: SHIPPED | OUTPATIENT
Start: 2020-10-12 | End: 2021-03-29

## 2020-10-12 NOTE — PROGRESS NOTES
Chief Complaint   Patient presents with    Establish Care    Well Woman     History of Present Illness: Saumya Braun is a 75 y.o. female that presents today 10/12/2020 for well gyn visit.  She reports that her cardiology stopped her patch after 27 years.  She reports weepy feeling and   Hot flashes and she wants to stay on her HRT.       Past Medical History:   Diagnosis Date    Cataract     OU    Coronary artery disease involving native coronary artery of native heart without angina pectoris 4/7/2020    Hypertension associated with diabetes 12/8/2015    Ischemic cardiomyopathy     Mixed hyperlipidemia     Multiple pulmonary nodules 3/26/2020    NSTEMI (non-ST elevated myocardial infarction) 3/26/2020    Squamous cell carcinoma of skin     right scalp and mid nasal bridge       Past Surgical History:   Procedure Laterality Date    CARDIAC CATHETERIZATION  04/02/2020    [place]:  Dr Viera    CORONARY ARTERY BYPASS GRAFT  06/10/2020    HYSTERECTOMY      age 38 TAHBSO on patch for 27 years    SKIN BIOPSY         Current Outpatient Medications   Medication Sig Dispense Refill    aspirin 81 MG Chew Take 81 mg by mouth once daily.      blood sugar diagnostic Strp TEST BLOOD SUGAR ONCE DAILY 300 each 3    clopidogreL (PLAVIX) 75 mg tablet Take 75 mg by mouth once daily.       co-enzyme Q-10 50 mg capsule Take 2 capsules (100 mg total) by mouth once daily.      estradiol (VIVELLE-DOT) 0.1 mg/24 hr PTSW APPLY 1 PATCH ONTO SKIN TWO TIMES A WEEK 8 patch 11    fluticasone (FLONASE) 50 mcg/actuation nasal spray 2 sprays (100 mcg total) by Each Nare route once daily. 16 g 6    furosemide (LASIX) 20 MG tablet Take 1 tablet (20 mg total) by mouth once daily. 90 tablet 3    JARDIANCE 10 mg tablet TAKE 1 TABLET BY MOUTH 1 TIME A DAY 30 tablet 3    Lactobacillus rhamnosus GG (CULTURELLE) 10 billion cell capsule Take 1 capsule by mouth once daily.      metoprolol succinate (TOPROL-XL) 100 MG 24 hr tablet  Take 1 tablet (100 mg total) by mouth daily      multivitamin capsule Take 1 capsule by mouth once daily.      omega-3 fatty acids/fish oil (FISH OIL-OMEGA-3 FATTY ACIDS) 300-1,000 mg capsule Take 1 capsule by mouth once daily.      rosuvastatin (CRESTOR) 20 MG tablet Take 1 tablet (20 mg total) by mouth once daily. 90 tablet 0    triamcinolone acetonide 0.1% (KENALOG) 0.1 % cream Apply topically 2 (two) times daily. Apply for 2 weeks, then take a break for 2 weeks. Repeat as needed. 45 g 1    paroxetine (PAXIL) 10 MG tablet Take 1 tablet (10 mg total) by mouth once daily. 30 tablet 2     No current facility-administered medications for this visit.        Review of patient's allergies indicates:  No Known Allergies    Family History   Problem Relation Age of Onset    No Known Problems Mother     No Known Problems Father     Skin cancer Neg Hx     Melanoma Neg Hx        Social History     Socioeconomic History    Marital status:      Spouse name: Not on file    Number of children: Not on file    Years of education: Not on file    Highest education level: Not on file   Occupational History    Not on file   Social Needs    Financial resource strain: Not on file    Food insecurity     Worry: Not on file     Inability: Not on file    Transportation needs     Medical: Not on file     Non-medical: Not on file   Tobacco Use    Smoking status: Never Smoker    Smokeless tobacco: Never Used   Substance and Sexual Activity    Alcohol use: No    Drug use: Never    Sexual activity: Not Currently     Partners: Male     Birth control/protection: See Surgical Hx   Lifestyle    Physical activity     Days per week: Not on file     Minutes per session: Not on file    Stress: Not on file   Relationships    Social connections     Talks on phone: Not on file     Gets together: Not on file     Attends Judaism service: Not on file     Active member of club or organization: Not on file     Attends meetings  "of clubs or organizations: Not on file     Relationship status: Not on file   Other Topics Concern    Are you pregnant or think you may be? Not Asked    Breast-feeding Not Asked   Social History Narrative    Not on file       OB History    Para Term  AB Living   3         3   SAB TAB Ectopic Multiple Live Births                  # Outcome Date GA Lbr Torey/2nd Weight Sex Delivery Anes PTL Lv   3             2             1                 Review of Symptoms:  GENERAL: Denies weight gain or weight loss. Feeling well overall.   SKIN: Denies rash or lesions.   HEAD: Denies head injury or headache.   NODES: Denies enlarged lymph nodes.   CHEST: Denies chest pain or shortness of breath.   CARDIOVASCULAR: Denies palpitations or left sided chest pain.   ABDOMEN: No abdominal pain, constipation, diarrhea, nausea, vomiting or rectal bleeding.   URINARY: No frequency, dysuria, hematuria, or burning on urination.  HEMATOLOGIC: No easy bruisability or excessive bleeding.   MUSCULOSKELETAL: Denies joint pain or swelling.     /60   Ht 5' 1" (1.549 m)   Wt 53.6 kg (118 lb 2.7 oz)   LMP 1986   Physical Exam:  APPEARANCE: Well nourished, well developed, in no acute distress.  SKIN: Normal skin turgor, no lesions.  NECK: Neck symmetric without masses   RESPIRATORY: Normal respiratory effort with no retractions or use of accessory muscles  CARDIOVASCULAR: Peripheral vascular system with no swelling no varicosities and palpation of pulses normal  LYMPHATIC: No enlargements of the lymph nodes noted in the neck, axillae, or groin  ABDOMEN: Soft. No tenderness or masses. No hepatosplenomegaly. No hernias.  BREASTS: Symmetrical, no skin changes or visible lesions. No palpable masses, nipple discharge or adenopathy bilaterally.  PELVIC: Normal external female genitalia without lesions. Normal hair distribution. Adequate perineal body, normal urethral meatus. Urethra with no masses.  Bladder " nontender. Vagina moist and well rugated without lesions or discharge. Grade 3 rectocele.  Adnexa without masses or tenderness. Urethra and bladder normal.   EXTREMITIES: No clubbing cyanosis or edema.    ASSESSMENT/PLAN:  Well woman exam  -     Ambulatory referral/consult to Obstetrics / Gynecology    Aspirin long-term use    Hx of CABG- 6/10/2020    Hot flashes    Mood swings  -     paroxetine (PAXIL) 10 MG tablet; Take 1 tablet (10 mg total) by mouth once daily.  Dispense: 30 tablet; Refill: 2    Rectocele      She refuses mammogram.   We discussed SSRI and over the counter meds.            Patient was counseled today on Pap guidelines. We discussed the discontinuing the pap smear after hysterectomy except in certain high risk cases.  We discussed the need for pelvic exams.   We discussed STD screening if at high risk for an STD.  We discussed breast cancer screening with mammograms every other year after the age of 40 and annually after the age of 50.    We discussed colon cancer screening.   Osteoporosis screening with the Dexa Bone Scan discussed when indicated.   She will see her PCP for other health maintenance.       FOLLOW-UP:prn

## 2020-10-12 NOTE — LETTER
October 12, 2020      Mary Ann Gerard MD  [address]  [place] [address]           [address] [address]  Phone: [phone]  Fax: [phone]          Patient: Saumya Braun   MR Number: [id number]   YOB: 1945   Date of Visit: 10/12/2020       Dear Dr. Mary Ann Gerard:    Thank you for referring Saumya Braun to me for evaluation. Attached you will find relevant portions of my assessment and plan of care.    If you have questions, please do not hesitate to call me. I look forward to following Saumya Braun along with you.    Sincerely,    Lashon Harrell MD    Enclosure  CC:  No Recipients    If you would like to receive this communication electronically, please contact [email or web address] or [phone] to request more information on [facility] Link access.    For providers and/or their staff who would like to refer a patient to [facility], please contact us through our one-stop-shop provider referral line, [facility], at [phone].    If you feel you have received this communication in error or would no longer like to receive these types of communications, please e-mail [email or web address]

## 2020-10-13 NOTE — TELEPHONE ENCOUNTER
----- Message from Jerri Vanessa sent at 10/13/2020  8:54 AM CDT -----  Regarding: med refill  .Type:  RX Refill Request    Who Called: pt  Refill or New Rx: refill    RX Name and Strength: plavix  (clopidogrel ) 75 mg tabs  How is the patient currently taking it? (ex. 1XDay):   Is this a 30 day or 90 day RX:#   Preferred Pharmacy with phone number:      [facility] - [facility] [address] [place]  [address] [address] [employer] [place] [address]  Phone: [phone] Fax: [phone]         Local or Mail Order: local   Ordering Provider:   Would the patient rather a call back or a response via [facility]? Call back   Best Call Back Number:[phone]   Additional Information:  pt request call back from nurse

## 2020-10-14 RX ORDER — CLOPIDOGREL BISULFATE 75 MG/1
75 TABLET ORAL DAILY
Qty: 30 TABLET | Refills: 1 | OUTPATIENT
Start: 2020-10-14

## 2020-11-02 RX ORDER — ROSUVASTATIN CALCIUM 20 MG/1
20 TABLET, COATED ORAL DAILY
Qty: 90 TABLET | Refills: 0 | Status: SHIPPED | OUTPATIENT
Start: 2020-11-02 | End: 2021-02-03

## 2020-11-02 NOTE — TELEPHONE ENCOUNTER
----- Message from Sarika Moncada sent at 11/2/2020  8:00 AM CST -----  Type:  RX Refill Request    Who Called:  Pt  Saumya   Refill or New Rx:    refill   RX Name and Strength:    Rosuvastatin Calcium 20mg tabs  How is the patient currently taking it? (ex. 1XDay):   Takes once daily   Is this a 30 day or 90 day RX:   90 day   Preferred Pharmacy with phone number:    [facility]   Local or Mail Order:  Local   Ordering Provider:  Dr Gerard   Would the patient rather a call back or a response via [facility]?   Call back   Best Call Back Number:  [phone]  Additional Information:  please call when sent in//mary/kip

## 2020-11-03 ENCOUNTER — PATIENT OUTREACH (OUTPATIENT)
Dept: ADMINISTRATIVE | Facility: OTHER | Age: 75
End: 2020-11-03

## 2020-11-03 NOTE — PROGRESS NOTES
Health Maintenance Due   Topic Date Due    Eye Exam  04/01/2020     Updates were requested from care everywhere.  Chart was reviewed for overdue Proactive [facility] Encounters ([facility]) topics (CRS, Breast Cancer Screening, Eye exam)  Health Maintenance has been updated.  LINKS immunization registry triggered.  Immunizations were not able to be reconciled. Patient not found in LINKS.

## 2020-11-05 ENCOUNTER — INITIAL CONSULT (OUTPATIENT)
Dept: DERMATOLOGY | Facility: CLINIC | Age: 75
End: 2020-11-05
Payer: MEDICARE

## 2020-11-05 VITALS
BODY MASS INDEX: 21.71 KG/M2 | SYSTOLIC BLOOD PRESSURE: 154 MMHG | DIASTOLIC BLOOD PRESSURE: 66 MMHG | HEART RATE: 68 BPM | HEIGHT: 61 IN | WEIGHT: 115 LBS

## 2020-11-05 DIAGNOSIS — C44.311 BASAL CELL CARCINOMA OF NOSE: Primary | ICD-10-CM

## 2020-11-05 PROCEDURE — 3077F SYST BP >= 140 MM HG: CPT | Mod: HCNC,CPTII,S$GLB, | Performed by: DERMATOLOGY

## 2020-11-05 PROCEDURE — 99214 OFFICE O/P EST MOD 30 MIN: CPT | Mod: HCNC,S$GLB,, | Performed by: DERMATOLOGY

## 2020-11-05 PROCEDURE — 1159F PR MEDICATION LIST DOCUMENTED IN MEDICAL RECORD: ICD-10-PCS | Mod: HCNC,S$GLB,, | Performed by: DERMATOLOGY

## 2020-11-05 PROCEDURE — 99999 PR PBB SHADOW E&M-EST. PATIENT-LVL IV: ICD-10-PCS | Mod: PBBFAC,HCNC,, | Performed by: DERMATOLOGY

## 2020-11-05 PROCEDURE — 1126F AMNT PAIN NOTED NONE PRSNT: CPT | Mod: HCNC,S$GLB,, | Performed by: DERMATOLOGY

## 2020-11-05 PROCEDURE — 1101F PR PT FALLS ASSESS DOC 0-1 FALLS W/OUT INJ PAST YR: ICD-10-PCS | Mod: HCNC,CPTII,S$GLB, | Performed by: DERMATOLOGY

## 2020-11-05 PROCEDURE — 3078F PR MOST RECENT DIASTOLIC BLOOD PRESSURE < 80 MM HG: ICD-10-PCS | Mod: HCNC,CPTII,S$GLB, | Performed by: DERMATOLOGY

## 2020-11-05 PROCEDURE — 1126F PR PAIN SEVERITY QUANTIFIED, NO PAIN PRESENT: ICD-10-PCS | Mod: HCNC,S$GLB,, | Performed by: DERMATOLOGY

## 2020-11-05 PROCEDURE — 3077F PR MOST RECENT SYSTOLIC BLOOD PRESSURE >= 140 MM HG: ICD-10-PCS | Mod: HCNC,CPTII,S$GLB, | Performed by: DERMATOLOGY

## 2020-11-05 PROCEDURE — 99999 PR PBB SHADOW E&M-EST. PATIENT-LVL IV: CPT | Mod: PBBFAC,HCNC,, | Performed by: DERMATOLOGY

## 2020-11-05 PROCEDURE — 99214 PR OFFICE/OUTPT VISIT, EST, LEVL IV, 30-39 MIN: ICD-10-PCS | Mod: HCNC,S$GLB,, | Performed by: DERMATOLOGY

## 2020-11-05 PROCEDURE — 1159F MED LIST DOCD IN RCRD: CPT | Mod: HCNC,S$GLB,, | Performed by: DERMATOLOGY

## 2020-11-05 PROCEDURE — 1101F PT FALLS ASSESS-DOCD LE1/YR: CPT | Mod: HCNC,CPTII,S$GLB, | Performed by: DERMATOLOGY

## 2020-11-05 PROCEDURE — 3078F DIAST BP <80 MM HG: CPT | Mod: HCNC,CPTII,S$GLB, | Performed by: DERMATOLOGY

## 2020-11-05 NOTE — LETTER
November 7, 2020      Chhaya Hagan MD  [address] [facility] [address] [place] [address]           [facility]  [address] [place] [address]  Phone: [phone]          Patient: Saumya Braun   MR Number: [id number]   YOB: 1945   Date of Visit: 11/5/2020       Dear Dr. Chhaya Hagan:    Thank you for referring Saumya Braun to me for evaluation. Attached you will find relevant portions of my assessment and plan of care.    If you have questions, please do not hesitate to call me. I look forward to following Saumya Braun along with you.    Sincerely,    Heriberto Elaine MD    Enclosure  CC:  No Recipients    If you would like to receive this communication electronically, please contact [email or web address] or [phone] to request more information on [facility] Link access.    For providers and/or their staff who would like to refer a patient to [facility], please contact us through our one-stop-shop provider referral line, [facility], at [phone].    If you feel you have received this communication in error or would no longer like to receive these types of communications, please e-mail [email or web address]

## 2020-11-05 NOTE — PROGRESS NOTES
ALLERGIES:  Patient has no known allergies.  CHIEF COMPLAINT:  This 75 y.o. female comes for evaluation for Mohs' Micrographic Surgery, Fresh Tissue Technique, for treatment of a biopsy-proven basal cell carcinoma on the left nasal supratip. Consultation requested by Chhaya Hagan M.D..    The patient is accompanied to this visit by her .    HISTORY OF PRESENT ILLNESS:   Location: let nasal supratip  Duration: unknown  Quality: better at present  Context: status post biopsy by Chhaya Hagan M.D.; path = basal cell carcinoma; pathology accession #[id number], Pathology [facility]     Prior Treatment: none  See also the handwritten notes/diagrams scanned to chart for additional details.    Defibrillator: No  Pacemaker: No  Artificial heart valves: No  Artificial joints: No    REVIEW OF SYSTEMS:   General: general health good  Skin: previous skin cancer(s) Yes   If yes, details: SCC scalp and nose, had mohs surgery by Dr. Biggs  Relevant other:  No   Cardiovascular:   High Blood Pressure: Yes   Chest Pain:  No   Defibrillator: as above  Pacemaker: as above  Artificial heart valves: as above  Prior Endocarditis: No   Prior Heart Attack/MI: No     If yes, when:   Prior Cardiac Bypass or Stents:  Yes   If yes, when: Quad by pass 6-  Mitral Valve Prolapse: No   Relevant other:  No   Respiratory:   Shortness of breath:  No   Relevant other:  No   Endocrine:   Diabetes:  No   Relevant other:  No   Hem/Lymph:   Taking Prescribed Blood Thinners:  Yes  Plavix  Easy Bleeding:  Yes   Relevant other:  No   Allergy/Immuno: as noted above  Relevant other:  No   GI:   Prior Hepatitis:  No     If yes, details:   Relevant other:  No   Musculoskeletal:   Artificial joints: as above  Relevant other:  No   Neurologic:   Prior Stroke:  No     If yes, details:   Relevant other:  No   Relevant other info:  Yes  Ischemic cardiomyopathy     PAST MEDICAL HISTORY:  Past Medical History:   Diagnosis Date    Cataract     OU     Coronary artery disease involving native coronary artery of native heart without angina pectoris 4/7/2020    Hypertension associated with diabetes 12/8/2015    Ischemic cardiomyopathy     Mixed hyperlipidemia     Multiple pulmonary nodules 3/26/2020    NSTEMI (non-ST elevated myocardial infarction) 3/26/2020    Squamous cell carcinoma of skin     right scalp and mid nasal bridge       PAST SURGICAL HISTORY:  Past Surgical History:   Procedure Laterality Date    CARDIAC CATHETERIZATION  04/02/2020    [place]:  Dr Viera    CORONARY ARTERY BYPASS GRAFT  06/10/2020    HYSTERECTOMY      age 38 TAHBSO on patch for 27 years    SKIN BIOPSY          SOCIAL HISTORY:  Dependencies: smoking status as noted below  Social History     Tobacco Use    Smoking status: Never Smoker    Smokeless tobacco: Never Used   Substance Use Topics    Alcohol use: No    Drug use: Never       PERTINENT MEDICATIONS:  See medications list.    Current Outpatient Medications:     blood sugar diagnostic Strp, TEST BLOOD SUGAR ONCE DAILY, Disp: 300 each, Rfl: 3    clopidogreL (PLAVIX) 75 mg tablet, Take 75 mg by mouth once daily. , Disp: , Rfl:     co-enzyme Q-10 50 mg capsule, Take 2 capsules (100 mg total) by mouth once daily., Disp:  , Rfl:     fluticasone (FLONASE) 50 mcg/actuation nasal spray, 2 sprays (100 mcg total) by Each Nare route once daily., Disp: 16 g, Rfl: 6    furosemide (LASIX) 20 MG tablet, Take 1 tablet (20 mg total) by mouth once daily., Disp: 90 tablet, Rfl: 3    JARDIANCE 10 mg tablet, TAKE 1 TABLET BY MOUTH 1 TIME A DAY, Disp: 30 tablet, Rfl: 3    metoprolol succinate (TOPROL-XL) 100 MG 24 hr tablet, Take 1 tablet (100 mg total) by mouth daily, Disp: , Rfl:     multivitamin capsule, Take 1 capsule by mouth once daily., Disp:  , Rfl:     omega-3 fatty acids/fish oil (FISH OIL-OMEGA-3 FATTY ACIDS) 300-1,000 mg capsule, Take 1 capsule by mouth once daily., Disp:  , Rfl:     rosuvastatin (CRESTOR) 20 MG  tablet, Take 1 tablet (20 mg total) by mouth once daily., Disp: 90 tablet, Rfl: 0    triamcinolone acetonide 0.1% (KENALOG) 0.1 % cream, Apply topically 2 (two) times daily. Apply for 2 weeks, then take a break for 2 weeks. Repeat as needed., Disp: 45 g, Rfl: 1    aspirin 81 MG Chew, Take 81 mg by mouth once daily., Disp: , Rfl:     estradiol (VIVELLE-DOT) 0.1 mg/24 hr PTSW, APPLY 1 PATCH ONTO SKIN TWO TIMES A WEEK (Patient not taking: Reported on 11/5/2020), Disp: 8 patch, Rfl: 11    Lactobacillus rhamnosus GG (CULTURELLE) 10 billion cell capsule, Take 1 capsule by mouth once daily., Disp: , Rfl:     paroxetine (PAXIL) 10 MG tablet, Take 1 tablet (10 mg total) by mouth once daily. (Patient not taking: Reported on 11/5/2020), Disp: 30 tablet, Rfl: 2    ALLERGIES:  Patient has no known allergies.    EXAM:  See also the handwritten notes/diagrams scanned to chart for additional details.  Constitutional  General appearance: well-developed, well-nourished, well-kempt older white female    Eyes  Inspection of conjunctivae and lids reveals no abnormalities; sclerae anicteric  Neurologic/Psychiatric  Alert,  normal orientation to time, place, person  Normal mood and affect with no evidence of depression, anxiety, agitation  Skin: see photo(s)  Head: background moderate solar damage to exposed areas of skin; in addition, inspection/palpation reveals an ill-defined, approximately 6-8 mm pink biopsy site on the lower nose; site(s) confirmed by reference to the photograph(s) attached below taken at the time of the biopsy/biopsies by the referring physician and she confirmed this as the site of the prior biopsy  Neck: examination reveals moderate chronic solar damage  Right upper extremity: examination reveals moderate chronic solar damage  Left upper extremity: examination reveals moderate chronic solar damage    Photo(s) this visit:       Photo(s) from biopsy visit:      ASSESSMENT: biopsy-proven basal cell carcinoma of  the lower nose  chronic solar damage to areas as noted above  personal history of non-melanoma skin cancer    PLAN:  The diagnosis and management options, and risks and benefits of the alternatives, including observation/non-treatment, radiation treatment, excision with vertical frozen section or paraffin-embedded section margin evaluation, and Mohs' Micrographic Surgery, Fresh Tissue Technique, were discussed at length with the patient and her . In particular, the discussion included, but was not limited to, the following:    One alternative at this point would be to defer further treatment and observe the lesion. With small skin cancers of this kind, it is possible that a biopsy can be sufficient to definitively treat a small skin cancer of this kind. Alternatively, some skin cancers are slow growing and do not require immediate treatment. The potential advantage of this choice would be to avoid the need for possibly unnecessary additional surgery. Among the potential disadvantages of this would be the possibility of enlargement of the lesion, more extensive spread of the lesion or recurrence at a later date, which might necessitate a larger and more complex surgery.    Radiation treatment can be an effective treatment for this type of skin cancer. The usual course of treatment is every weekday for several weeks. Local irritation will result from treatment, although no systemic side effects are expected. The potential advantage of radiation treatment is that it avoids the need for surgery. Among the disadvantages of radiation treatment are the length of treatment, the local inflammatory response, the absence of pathologic confirmation of the removal of the skin cancer, a possible increased risk of additional skin cancer in the treated area in later years, and a somewhat increased risk of recurrence at a later date.     Excisional surgery can be an effective treatment for this type of skin cancer. This would  involve excision of the lesion with margin evaluation by submitting the specimen to a pathologist for either immediate marginal assessment via frozen section processing, or delayed marginal assessment by fixed-tissue processing. The potential advantage of this technique is that it offers a way of treating the lesion with some degree of histologic confirmation of tumor removal. Among the disadvantages of this treatment are the possible need for re-excision if marginal involvement is identified, a somewhat greater likelihood of recurrence as compared to Mohs' surgery because of the less comprehensive margin evaluation inherent in the technique, and the general potential risks of surgery, including allergic reactions to the anesthetic and other materials used, infection, injury to nerves in the area with consequent loss of sensation or muscle function, and scarring or distortion of surrounding structures.    Mohs' surgery is a very effective treatment for this type of skin cancer. The potential advantage of Mohs' surgery is that this technique offers the greatest possible certainty of knowing that the skin cancer has been completely removed, with the removal of the least amount of normal tissue. The potential disadvantages of Mohs' surgery include the duration of the surgery, the possible need for a separate surgery for reconstruction following tumor removal, and scarring as a result. In addition, general potential risks of surgery as noted above also apply to treatment via Mohs' surgery.    In light of the nature of this tumor and the location  On the nose in an area of increased risk of recurrence,  Mohs' micrographic surgery was thought to be the most appropriate management choice, and this diagnosis is appropriate for treatment by Mohs' micrographic surgery.     We also discussed options for repair of the site to follow tumor removal via Mohs' surgery, including the participation of a reconstructive surgeon to  reconstruct the resultant wound. Given the location, the anticipated size and potential complexity of the defect to follow tumor removal via Mohs' surgery, reconstruction will be coordinated with Arjun Lawrence MD.  I will contact Dr. Lawrence to arrange for the patient to be seen in consultation, and we will coordinate the surgeries thereafter.    Sufficient time was available for questions, and all questions were answered to her satisfaction. She fully understands the aims, risks, alternatives, and possible complications, and has elected to proceed with the surgery, and verbally consented to do so. The procedure will be scheduled in the near future.    Routine pre-op instructions were given to her.    --------------------------------------  Note: Some or all of this note may have been generated using voice recognition software. There may be voice recognition errors including grammatical and/or spelling errors found in the text. Attempts were made to correct these errors prior to signature.

## 2020-11-09 ENCOUNTER — TELEPHONE (OUTPATIENT)
Dept: OTOLARYNGOLOGY | Facility: CLINIC | Age: 75
End: 2020-11-09

## 2020-11-09 NOTE — TELEPHONE ENCOUNTER
----- Message from Heriberto Elaine MD sent at 11/7/2020 11:18 AM CST -----  Arjun,   I would like to have you see this patient, Saumya Braun, in consultation regarding repair to follow Mohs' surgery for a basal cell carcinoma on her left lower nose.   Ms Braun is a 75 y.o. lady with a medical history notable for prior surgery on her nose, 6+ years ago.  She also has underlying cardiac disease and is on Plavix.  Attached is a photo of the site which Dr. Hagan took at the time of the biopsy.  I will also copy this message to your staff so that they can contact her to schedule an appointment with you preoperatively. We can coordinate her surgeries after you have seen her.   Please let me know if you have any questions.  Many thanks,   Heriberto

## 2020-11-18 ENCOUNTER — PROCEDURE VISIT (OUTPATIENT)
Dept: DERMATOLOGY | Facility: CLINIC | Age: 75
End: 2020-11-18
Payer: MEDICARE

## 2020-11-18 VITALS — HEIGHT: 61 IN | RESPIRATION RATE: 18 BRPM | BODY MASS INDEX: 21.72 KG/M2 | WEIGHT: 115.06 LBS

## 2020-11-18 DIAGNOSIS — C44.612 BASAL CELL CARCINOMA (BCC) OF RIGHT UPPER EXTREMITY: Primary | ICD-10-CM

## 2020-11-18 PROCEDURE — 17261 DSTRJ MAL LES T/A/L .6-1.0CM: CPT | Mod: HCNC,S$GLB,, | Performed by: DERMATOLOGY

## 2020-11-18 PROCEDURE — 17261 PR DESTR MALIG TRUNK,EXTREM 0.6-1 CM: ICD-10-PCS | Mod: HCNC,S$GLB,, | Performed by: DERMATOLOGY

## 2020-11-18 PROCEDURE — 99499 UNLISTED E&M SERVICE: CPT | Mod: HCNC,S$GLB,, | Performed by: DERMATOLOGY

## 2020-11-18 PROCEDURE — 99499 NO LOS: ICD-10-PCS | Mod: HCNC,S$GLB,, | Performed by: DERMATOLOGY

## 2020-11-18 NOTE — PROGRESS NOTES
Here for electrodesiccation and curettage of Superficial and nodular BCC on the R forearm. bx done on 9/30/2020:    Recent pathology:  Skin, right forearm, shave biopsy:   - BASAL CELL CARCINOMA WITH MIXED SUPERFICIAL AND NODULAR GROWTH PATTERN.   - MARGINS ARE NEGATIVE IN THE PLANES OF SECTION.   MICROSCOPIC DESCRIPTION: Sections show multiple foci of basaloid cells, some   of them in apoptosis, arising along the dermoepidermal junction and extending   into the papillary dermis. Nodules of basal cell carcinoma are also noted.    yes Phx of NMSC.  +multiple SCC  BCC on right forearm ED&C 11/18/20  BCC on left nasal supratip to be scheduled with [facility]  no Fhx of melanoma.     Electrodessication and Curettage Procedure note:    Verbal consent obtained. Lesional tissue marked and prepped with alcohol. Lesion anesthetized with 1% lidocaine with epinephrine. Curettage and Desiccation x 3 cycles to base. Aluminum chloride for hemostasis. Lesion size after primary curettage: 0.9 cm    Area bandaged and wound care explained.    F/u 3 months

## 2020-12-04 ENCOUNTER — TELEPHONE (OUTPATIENT)
Dept: OBSTETRICS AND GYNECOLOGY | Facility: CLINIC | Age: 75
End: 2020-12-04

## 2020-12-04 NOTE — TELEPHONE ENCOUNTER
----- Message from Emmett Aguayo sent at 12/4/2020 10:20 AM CST -----  Contact: patient   [phone]  Saumya Nelson calling requesting to speak with you regarding medication patient states she's no longer taking paroxetine (PAXIL) 10 MG tablet AND ESTROVEN   Please advise

## 2020-12-11 ENCOUNTER — PATIENT MESSAGE (OUTPATIENT)
Dept: OTHER | Facility: OTHER | Age: 75
End: 2020-12-11

## 2020-12-22 ENCOUNTER — LAB VISIT (OUTPATIENT)
Dept: LAB | Facility: HOSPITAL | Age: 75
End: 2020-12-22
Attending: INTERNAL MEDICINE
Payer: MEDICARE

## 2020-12-22 DIAGNOSIS — E78.5 HYPERLIPIDEMIA ASSOCIATED WITH TYPE 2 DIABETES MELLITUS: Chronic | ICD-10-CM

## 2020-12-22 DIAGNOSIS — I15.2 HYPERTENSION ASSOCIATED WITH DIABETES: Chronic | ICD-10-CM

## 2020-12-22 DIAGNOSIS — E11.9 TYPE 2 DIABETES MELLITUS WITHOUT COMPLICATION, WITHOUT LONG-TERM CURRENT USE OF INSULIN: Chronic | ICD-10-CM

## 2020-12-22 DIAGNOSIS — E11.69 HYPERLIPIDEMIA ASSOCIATED WITH TYPE 2 DIABETES MELLITUS: Chronic | ICD-10-CM

## 2020-12-22 DIAGNOSIS — E11.59 HYPERTENSION ASSOCIATED WITH DIABETES: Chronic | ICD-10-CM

## 2020-12-22 LAB
ALBUMIN SERPL BCP-MCNC: 4.2 G/DL (ref 3.5–5.2)
ALP SERPL-CCNC: 54 U/L (ref 55–135)
ALT SERPL W/O P-5'-P-CCNC: 40 U/L (ref 10–44)
ANION GAP SERPL CALC-SCNC: 12 MMOL/L (ref 8–16)
AST SERPL-CCNC: 33 U/L (ref 10–40)
BILIRUB SERPL-MCNC: 0.5 MG/DL (ref 0.1–1)
BUN SERPL-MCNC: 18 MG/DL (ref 8–23)
CALCIUM SERPL-MCNC: 9.5 MG/DL (ref 8.7–10.5)
CHLORIDE SERPL-SCNC: 102 MMOL/L (ref 95–110)
CHOLEST SERPL-MCNC: 134 MG/DL (ref 120–199)
CHOLEST/HDLC SERPL: 2.4 {RATIO} (ref 2–5)
CO2 SERPL-SCNC: 27 MMOL/L (ref 23–29)
CREAT SERPL-MCNC: 0.7 MG/DL (ref 0.5–1.4)
EST. GFR  (AFRICAN AMERICAN): >60 ML/MIN/1.73 M^2
EST. GFR  (NON AFRICAN AMERICAN): >60 ML/MIN/1.73 M^2
ESTIMATED AVG GLUCOSE: 151 MG/DL (ref 68–131)
GLUCOSE SERPL-MCNC: 126 MG/DL (ref 70–110)
HBA1C MFR BLD HPLC: 6.9 % (ref 4–5.6)
HDLC SERPL-MCNC: 55 MG/DL (ref 40–75)
HDLC SERPL: 41 % (ref 20–50)
LDLC SERPL CALC-MCNC: 53 MG/DL (ref 63–159)
NONHDLC SERPL-MCNC: 79 MG/DL
POTASSIUM SERPL-SCNC: 3.5 MMOL/L (ref 3.5–5.1)
PROT SERPL-MCNC: 7.6 G/DL (ref 6–8.4)
SODIUM SERPL-SCNC: 141 MMOL/L (ref 136–145)
TRIGL SERPL-MCNC: 130 MG/DL (ref 30–150)

## 2020-12-22 PROCEDURE — 80061 LIPID PANEL: CPT | Mod: HCNC

## 2020-12-22 PROCEDURE — 83036 HEMOGLOBIN GLYCOSYLATED A1C: CPT | Mod: HCNC

## 2020-12-22 PROCEDURE — 36415 COLL VENOUS BLD VENIPUNCTURE: CPT | Mod: HCNC,PO

## 2020-12-22 PROCEDURE — 80053 COMPREHEN METABOLIC PANEL: CPT | Mod: HCNC

## 2021-01-04 ENCOUNTER — PATIENT MESSAGE (OUTPATIENT)
Dept: FAMILY MEDICINE | Facility: CLINIC | Age: 76
End: 2021-01-04

## 2021-01-06 ENCOUNTER — PATIENT OUTREACH (OUTPATIENT)
Dept: ADMINISTRATIVE | Facility: OTHER | Age: 76
End: 2021-01-06

## 2021-01-08 ENCOUNTER — OFFICE VISIT (OUTPATIENT)
Dept: OPHTHALMOLOGY | Facility: CLINIC | Age: 76
End: 2021-01-08
Payer: MEDICARE

## 2021-01-08 ENCOUNTER — IMMUNIZATION (OUTPATIENT)
Dept: FAMILY MEDICINE | Facility: CLINIC | Age: 76
End: 2021-01-08
Payer: MEDICARE

## 2021-01-08 DIAGNOSIS — H35.3132 NONEXUDATIVE AGE-RELATED MACULAR DEGENERATION, BILATERAL, INTERMEDIATE DRY STAGE: ICD-10-CM

## 2021-01-08 DIAGNOSIS — H25.11 AGE-RELATED NUCLEAR CATARACT OF RIGHT EYE: Primary | ICD-10-CM

## 2021-01-08 DIAGNOSIS — E11.9 DIABETES MELLITUS TYPE 2 WITHOUT RETINOPATHY: ICD-10-CM

## 2021-01-08 DIAGNOSIS — Z23 NEED FOR VACCINATION: ICD-10-CM

## 2021-01-08 DIAGNOSIS — H35.52 RETINITIS PIGMENTOSA, BOTH EYES: ICD-10-CM

## 2021-01-08 PROCEDURE — 1126F AMNT PAIN NOTED NONE PRSNT: CPT | Mod: HCNC,S$GLB,, | Performed by: OPHTHALMOLOGY

## 2021-01-08 PROCEDURE — 1101F PR PT FALLS ASSESS DOC 0-1 FALLS W/OUT INJ PAST YR: ICD-10-PCS | Mod: HCNC,CPTII,S$GLB, | Performed by: OPHTHALMOLOGY

## 2021-01-08 PROCEDURE — 99999 PR PBB SHADOW E&M-EST. PATIENT-LVL III: ICD-10-PCS | Mod: PBBFAC,HCNC,, | Performed by: OPHTHALMOLOGY

## 2021-01-08 PROCEDURE — 2023F DILAT RTA XM W/O RTNOPTHY: CPT | Mod: HCNC,S$GLB,, | Performed by: OPHTHALMOLOGY

## 2021-01-08 PROCEDURE — 3288F PR FALLS RISK ASSESSMENT DOCUMENTED: ICD-10-PCS | Mod: HCNC,CPTII,S$GLB, | Performed by: OPHTHALMOLOGY

## 2021-01-08 PROCEDURE — 3288F FALL RISK ASSESSMENT DOCD: CPT | Mod: HCNC,CPTII,S$GLB, | Performed by: OPHTHALMOLOGY

## 2021-01-08 PROCEDURE — 99999 PR PBB SHADOW E&M-EST. PATIENT-LVL III: CPT | Mod: PBBFAC,HCNC,, | Performed by: OPHTHALMOLOGY

## 2021-01-08 PROCEDURE — 1126F PR PAIN SEVERITY QUANTIFIED, NO PAIN PRESENT: ICD-10-PCS | Mod: HCNC,S$GLB,, | Performed by: OPHTHALMOLOGY

## 2021-01-08 PROCEDURE — 92015 PR REFRACTION: ICD-10-PCS | Mod: HCNC,S$GLB,, | Performed by: OPHTHALMOLOGY

## 2021-01-08 PROCEDURE — 92014 PR EYE EXAM, EST PATIENT,COMPREHESV: ICD-10-PCS | Mod: HCNC,S$GLB,, | Performed by: OPHTHALMOLOGY

## 2021-01-08 PROCEDURE — 1101F PT FALLS ASSESS-DOCD LE1/YR: CPT | Mod: HCNC,CPTII,S$GLB, | Performed by: OPHTHALMOLOGY

## 2021-01-08 PROCEDURE — 91300 COVID-19, MRNA, LNP-S, PF, 30 MCG/0.3 ML DOSE VACCINE: CPT | Mod: PBBFAC | Performed by: INTERNAL MEDICINE

## 2021-01-08 PROCEDURE — 2023F PR DILATED RETINAL EXAM W/O EVID OF RETINOPATHY: ICD-10-PCS | Mod: HCNC,S$GLB,, | Performed by: OPHTHALMOLOGY

## 2021-01-08 PROCEDURE — 92015 DETERMINE REFRACTIVE STATE: CPT | Mod: HCNC,S$GLB,, | Performed by: OPHTHALMOLOGY

## 2021-01-08 PROCEDURE — 92014 COMPRE OPH EXAM EST PT 1/>: CPT | Mod: HCNC,S$GLB,, | Performed by: OPHTHALMOLOGY

## 2021-01-14 ENCOUNTER — TELEPHONE (OUTPATIENT)
Dept: OPHTHALMOLOGY | Facility: CLINIC | Age: 76
End: 2021-01-14

## 2021-01-15 ENCOUNTER — PATIENT MESSAGE (OUTPATIENT)
Dept: OPHTHALMOLOGY | Facility: CLINIC | Age: 76
End: 2021-01-15

## 2021-01-19 DIAGNOSIS — E11.59 HYPERTENSION ASSOCIATED WITH DIABETES: ICD-10-CM

## 2021-01-19 DIAGNOSIS — Z95.1 STATUS POST AORTO-CORONARY ARTERY BYPASS GRAFT: Chronic | ICD-10-CM

## 2021-01-19 DIAGNOSIS — E11.69 HYPERLIPIDEMIA ASSOCIATED WITH TYPE 2 DIABETES MELLITUS: Chronic | ICD-10-CM

## 2021-01-19 DIAGNOSIS — Z79.02 ANTIPLATELET OR ANTITHROMBOTIC LONG-TERM USE: Chronic | ICD-10-CM

## 2021-01-19 DIAGNOSIS — Z13.0 SCREENING FOR DEFICIENCY ANEMIA: ICD-10-CM

## 2021-01-19 DIAGNOSIS — I25.10 CORONARY ARTERY DISEASE INVOLVING NATIVE CORONARY ARTERY OF NATIVE HEART WITHOUT ANGINA PECTORIS: Chronic | ICD-10-CM

## 2021-01-19 DIAGNOSIS — E78.5 HYPERLIPIDEMIA ASSOCIATED WITH TYPE 2 DIABETES MELLITUS: Chronic | ICD-10-CM

## 2021-01-19 DIAGNOSIS — E11.9 TYPE 2 DIABETES MELLITUS WITHOUT COMPLICATION, WITHOUT LONG-TERM CURRENT USE OF INSULIN: Chronic | ICD-10-CM

## 2021-01-19 DIAGNOSIS — R79.89 LOW VITAMIN B12 LEVEL: ICD-10-CM

## 2021-01-19 DIAGNOSIS — Z79.899 LONG TERM CURRENT USE OF DIURETIC: Chronic | ICD-10-CM

## 2021-01-19 DIAGNOSIS — I25.5 ISCHEMIC CARDIOMYOPATHY: Chronic | ICD-10-CM

## 2021-01-19 DIAGNOSIS — Z13.29 SCREENING FOR THYROID DISORDER: ICD-10-CM

## 2021-01-19 DIAGNOSIS — I15.2 HYPERTENSION ASSOCIATED WITH DIABETES: ICD-10-CM

## 2021-01-19 DIAGNOSIS — R91.8 MULTIPLE PULMONARY NODULES: Primary | Chronic | ICD-10-CM

## 2021-01-19 DIAGNOSIS — Z13.21 ENCOUNTER FOR VITAMIN DEFICIENCY SCREENING: ICD-10-CM

## 2021-01-19 DIAGNOSIS — Z13.89 SCREENING FOR HEMATURIA OR PROTEINURIA: ICD-10-CM

## 2021-01-19 DIAGNOSIS — Z79.82 ASPIRIN LONG-TERM USE: Chronic | ICD-10-CM

## 2021-01-19 DIAGNOSIS — I50.22 CHRONIC SYSTOLIC HEART FAILURE: Chronic | ICD-10-CM

## 2021-01-19 RX ORDER — FUROSEMIDE 20 MG/1
20 TABLET ORAL DAILY
Qty: 90 TABLET | Refills: 0 | Status: SHIPPED | OUTPATIENT
Start: 2021-01-19 | End: 2021-07-28 | Stop reason: SDUPTHER

## 2021-01-21 ENCOUNTER — PATIENT MESSAGE (OUTPATIENT)
Dept: FAMILY MEDICINE | Facility: CLINIC | Age: 76
End: 2021-01-21

## 2021-01-27 ENCOUNTER — TELEPHONE (OUTPATIENT)
Dept: DERMATOLOGY | Facility: CLINIC | Age: 76
End: 2021-01-27

## 2021-01-28 ENCOUNTER — TELEPHONE (OUTPATIENT)
Dept: OPHTHALMOLOGY | Facility: CLINIC | Age: 76
End: 2021-01-28

## 2021-01-29 ENCOUNTER — TELEPHONE (OUTPATIENT)
Dept: DERMATOLOGY | Facility: CLINIC | Age: 76
End: 2021-01-29

## 2021-01-29 ENCOUNTER — IMMUNIZATION (OUTPATIENT)
Dept: FAMILY MEDICINE | Facility: CLINIC | Age: 76
End: 2021-01-29
Payer: MEDICARE

## 2021-01-29 DIAGNOSIS — Z23 NEED FOR VACCINATION: Primary | ICD-10-CM

## 2021-01-29 PROCEDURE — 91300 COVID-19, MRNA, LNP-S, PF, 30 MCG/0.3 ML DOSE VACCINE: CPT | Mod: PBBFAC | Performed by: FAMILY MEDICINE

## 2021-01-29 PROCEDURE — 0002A COVID-19, MRNA, LNP-S, PF, 30 MCG/0.3 ML DOSE VACCINE: CPT | Mod: PBBFAC | Performed by: FAMILY MEDICINE

## 2021-03-22 ENCOUNTER — LAB VISIT (OUTPATIENT)
Dept: LAB | Facility: HOSPITAL | Age: 76
End: 2021-03-22
Attending: INTERNAL MEDICINE
Payer: MEDICARE

## 2021-03-22 DIAGNOSIS — I25.5 ISCHEMIC CARDIOMYOPATHY: Chronic | ICD-10-CM

## 2021-03-22 DIAGNOSIS — Z79.82 ASPIRIN LONG-TERM USE: Chronic | ICD-10-CM

## 2021-03-22 DIAGNOSIS — I50.22 CHRONIC SYSTOLIC HEART FAILURE: Chronic | ICD-10-CM

## 2021-03-22 DIAGNOSIS — E11.69 HYPERLIPIDEMIA ASSOCIATED WITH TYPE 2 DIABETES MELLITUS: Chronic | ICD-10-CM

## 2021-03-22 DIAGNOSIS — R79.89 LOW VITAMIN B12 LEVEL: ICD-10-CM

## 2021-03-22 DIAGNOSIS — Z79.899 LONG TERM CURRENT USE OF DIURETIC: Chronic | ICD-10-CM

## 2021-03-22 DIAGNOSIS — Z13.89 SCREENING FOR HEMATURIA OR PROTEINURIA: ICD-10-CM

## 2021-03-22 DIAGNOSIS — E11.9 TYPE 2 DIABETES MELLITUS WITHOUT COMPLICATION, WITHOUT LONG-TERM CURRENT USE OF INSULIN: Chronic | ICD-10-CM

## 2021-03-22 DIAGNOSIS — I25.10 CORONARY ARTERY DISEASE INVOLVING NATIVE CORONARY ARTERY OF NATIVE HEART WITHOUT ANGINA PECTORIS: Chronic | ICD-10-CM

## 2021-03-22 DIAGNOSIS — E78.5 HYPERLIPIDEMIA ASSOCIATED WITH TYPE 2 DIABETES MELLITUS: Chronic | ICD-10-CM

## 2021-03-22 DIAGNOSIS — I15.2 HYPERTENSION ASSOCIATED WITH DIABETES: ICD-10-CM

## 2021-03-22 DIAGNOSIS — Z79.02 ANTIPLATELET OR ANTITHROMBOTIC LONG-TERM USE: Chronic | ICD-10-CM

## 2021-03-22 DIAGNOSIS — Z13.21 ENCOUNTER FOR VITAMIN DEFICIENCY SCREENING: ICD-10-CM

## 2021-03-22 DIAGNOSIS — Z13.0 SCREENING FOR DEFICIENCY ANEMIA: ICD-10-CM

## 2021-03-22 DIAGNOSIS — Z13.29 SCREENING FOR THYROID DISORDER: ICD-10-CM

## 2021-03-22 DIAGNOSIS — E11.59 HYPERTENSION ASSOCIATED WITH DIABETES: ICD-10-CM

## 2021-03-22 DIAGNOSIS — Z95.1 STATUS POST AORTO-CORONARY ARTERY BYPASS GRAFT: Chronic | ICD-10-CM

## 2021-03-22 DIAGNOSIS — R91.8 MULTIPLE PULMONARY NODULES: Chronic | ICD-10-CM

## 2021-03-22 LAB
ALBUMIN SERPL BCP-MCNC: 4.1 G/DL (ref 3.5–5.2)
ALP SERPL-CCNC: 53 U/L (ref 55–135)
ALT SERPL W/O P-5'-P-CCNC: 43 U/L (ref 10–44)
ANION GAP SERPL CALC-SCNC: 10 MMOL/L (ref 8–16)
AST SERPL-CCNC: 33 U/L (ref 10–40)
BASOPHILS # BLD AUTO: 0.04 K/UL (ref 0–0.2)
BASOPHILS NFR BLD: 0.6 % (ref 0–1.9)
BILIRUB SERPL-MCNC: 0.5 MG/DL (ref 0.1–1)
BUN SERPL-MCNC: 18 MG/DL (ref 8–23)
CALCIUM SERPL-MCNC: 10 MG/DL (ref 8.7–10.5)
CHLORIDE SERPL-SCNC: 104 MMOL/L (ref 95–110)
CHOLEST SERPL-MCNC: 160 MG/DL (ref 120–199)
CHOLEST/HDLC SERPL: 3 {RATIO} (ref 2–5)
CO2 SERPL-SCNC: 27 MMOL/L (ref 23–29)
CREAT SERPL-MCNC: 0.8 MG/DL (ref 0.5–1.4)
DIFFERENTIAL METHOD: ABNORMAL
EOSINOPHIL # BLD AUTO: 0.2 K/UL (ref 0–0.5)
EOSINOPHIL NFR BLD: 2.6 % (ref 0–8)
ERYTHROCYTE [DISTWIDTH] IN BLOOD BY AUTOMATED COUNT: 13.3 % (ref 11.5–14.5)
EST. GFR  (AFRICAN AMERICAN): >60 ML/MIN/1.73 M^2
EST. GFR  (NON AFRICAN AMERICAN): >60 ML/MIN/1.73 M^2
ESTIMATED AVG GLUCOSE: 166 MG/DL (ref 68–131)
GLUCOSE SERPL-MCNC: 138 MG/DL (ref 70–110)
HBA1C MFR BLD: 7.4 % (ref 4–5.6)
HCT VFR BLD AUTO: 45.6 % (ref 37–48.5)
HDLC SERPL-MCNC: 53 MG/DL (ref 40–75)
HDLC SERPL: 33.1 % (ref 20–50)
HGB BLD-MCNC: 14.3 G/DL (ref 12–16)
IMM GRANULOCYTES # BLD AUTO: 0.01 K/UL (ref 0–0.04)
IMM GRANULOCYTES NFR BLD AUTO: 0.2 % (ref 0–0.5)
LDLC SERPL CALC-MCNC: 72.2 MG/DL (ref 63–159)
LYMPHOCYTES # BLD AUTO: 2.6 K/UL (ref 1–4.8)
LYMPHOCYTES NFR BLD: 38.7 % (ref 18–48)
MAGNESIUM SERPL-MCNC: 2.1 MG/DL (ref 1.6–2.6)
MCH RBC QN AUTO: 31.8 PG (ref 27–31)
MCHC RBC AUTO-ENTMCNC: 31.4 G/DL (ref 32–36)
MCV RBC AUTO: 101 FL (ref 82–98)
MONOCYTES # BLD AUTO: 0.5 K/UL (ref 0.3–1)
MONOCYTES NFR BLD: 6.9 % (ref 4–15)
NEUTROPHILS # BLD AUTO: 3.4 K/UL (ref 1.8–7.7)
NEUTROPHILS NFR BLD: 51 % (ref 38–73)
NONHDLC SERPL-MCNC: 107 MG/DL
NRBC BLD-RTO: 0 /100 WBC
PLATELET # BLD AUTO: 206 K/UL (ref 150–350)
PMV BLD AUTO: 10 FL (ref 9.2–12.9)
POTASSIUM SERPL-SCNC: 3.7 MMOL/L (ref 3.5–5.1)
PROT SERPL-MCNC: 7.5 G/DL (ref 6–8.4)
RBC # BLD AUTO: 4.5 M/UL (ref 4–5.4)
SODIUM SERPL-SCNC: 141 MMOL/L (ref 136–145)
TRIGL SERPL-MCNC: 174 MG/DL (ref 30–150)
TSH SERPL DL<=0.005 MIU/L-ACNC: 2.77 UIU/ML (ref 0.4–4)
WBC # BLD AUTO: 6.64 K/UL (ref 3.9–12.7)

## 2021-03-22 PROCEDURE — 85025 COMPLETE CBC W/AUTO DIFF WBC: CPT | Performed by: INTERNAL MEDICINE

## 2021-03-22 PROCEDURE — 83921 ORGANIC ACID SINGLE QUANT: CPT | Performed by: INTERNAL MEDICINE

## 2021-03-22 PROCEDURE — 80061 LIPID PANEL: CPT | Performed by: INTERNAL MEDICINE

## 2021-03-22 PROCEDURE — 82607 VITAMIN B-12: CPT | Performed by: INTERNAL MEDICINE

## 2021-03-22 PROCEDURE — 83036 HEMOGLOBIN GLYCOSYLATED A1C: CPT | Performed by: INTERNAL MEDICINE

## 2021-03-22 PROCEDURE — 82746 ASSAY OF FOLIC ACID SERUM: CPT | Performed by: INTERNAL MEDICINE

## 2021-03-22 PROCEDURE — 80053 COMPREHEN METABOLIC PANEL: CPT | Performed by: INTERNAL MEDICINE

## 2021-03-22 PROCEDURE — 84443 ASSAY THYROID STIM HORMONE: CPT | Performed by: INTERNAL MEDICINE

## 2021-03-22 PROCEDURE — 82306 VITAMIN D 25 HYDROXY: CPT | Performed by: INTERNAL MEDICINE

## 2021-03-22 PROCEDURE — 83735 ASSAY OF MAGNESIUM: CPT | Performed by: INTERNAL MEDICINE

## 2021-03-23 LAB
25(OH)D3+25(OH)D2 SERPL-MCNC: 35 NG/ML (ref 30–96)
FOLATE SERPL-MCNC: 15.1 NG/ML (ref 4–24)
VIT B12 SERPL-MCNC: 626 PG/ML (ref 210–950)

## 2021-03-26 PROBLEM — D75.89 MACROCYTOSIS WITHOUT ANEMIA: Status: ACTIVE | Noted: 2021-03-26

## 2021-03-26 LAB — METHYLMALONATE SERPL-SCNC: 0.14 UMOL/L

## 2021-03-29 ENCOUNTER — OFFICE VISIT (OUTPATIENT)
Dept: FAMILY MEDICINE | Facility: CLINIC | Age: 76
End: 2021-03-29
Payer: MEDICARE

## 2021-03-29 VITALS
RESPIRATION RATE: 16 BRPM | DIASTOLIC BLOOD PRESSURE: 58 MMHG | WEIGHT: 126.19 LBS | SYSTOLIC BLOOD PRESSURE: 132 MMHG | OXYGEN SATURATION: 99 % | BODY MASS INDEX: 23.83 KG/M2 | TEMPERATURE: 98 F | HEIGHT: 61 IN | HEART RATE: 71 BPM

## 2021-03-29 DIAGNOSIS — I50.32 CHRONIC HEART FAILURE WITH PRESERVED EJECTION FRACTION: ICD-10-CM

## 2021-03-29 DIAGNOSIS — Z95.1 STATUS POST AORTO-CORONARY ARTERY BYPASS GRAFT: Chronic | ICD-10-CM

## 2021-03-29 DIAGNOSIS — R91.8 MULTIPLE PULMONARY NODULES: ICD-10-CM

## 2021-03-29 DIAGNOSIS — D75.89 MACROCYTOSIS WITHOUT ANEMIA: ICD-10-CM

## 2021-03-29 DIAGNOSIS — Z71.2 ENCOUNTER TO DISCUSS TEST RESULTS: Primary | ICD-10-CM

## 2021-03-29 DIAGNOSIS — Z79.899 LONG TERM CURRENT USE OF DIURETIC: ICD-10-CM

## 2021-03-29 DIAGNOSIS — E78.5 HYPERLIPIDEMIA ASSOCIATED WITH TYPE 2 DIABETES MELLITUS: Chronic | ICD-10-CM

## 2021-03-29 DIAGNOSIS — R91.1 SOLITARY PULMONARY NODULE: ICD-10-CM

## 2021-03-29 DIAGNOSIS — E11.59 HYPERTENSION ASSOCIATED WITH DIABETES: ICD-10-CM

## 2021-03-29 DIAGNOSIS — I25.10 CORONARY ARTERY DISEASE INVOLVING NATIVE CORONARY ARTERY OF NATIVE HEART WITHOUT ANGINA PECTORIS: Chronic | ICD-10-CM

## 2021-03-29 DIAGNOSIS — H35.3132 NONEXUDATIVE AGE-RELATED MACULAR DEGENERATION, BILATERAL, INTERMEDIATE DRY STAGE: Chronic | ICD-10-CM

## 2021-03-29 DIAGNOSIS — E11.69 HYPERLIPIDEMIA ASSOCIATED WITH TYPE 2 DIABETES MELLITUS: Chronic | ICD-10-CM

## 2021-03-29 DIAGNOSIS — E11.65 TYPE 2 DIABETES MELLITUS WITH HYPERGLYCEMIA, WITHOUT LONG-TERM CURRENT USE OF INSULIN: Chronic | ICD-10-CM

## 2021-03-29 DIAGNOSIS — I15.2 HYPERTENSION ASSOCIATED WITH DIABETES: ICD-10-CM

## 2021-03-29 DIAGNOSIS — Z79.02 ANTIPLATELET OR ANTITHROMBOTIC LONG-TERM USE: ICD-10-CM

## 2021-03-29 PROBLEM — Z79.890 POST-MENOPAUSE ON HRT (HORMONE REPLACEMENT THERAPY): Status: ACTIVE | Noted: 2020-09-17

## 2021-03-29 PROBLEM — Z79.82 ASPIRIN LONG-TERM USE: Status: ACTIVE | Noted: 2020-09-17

## 2021-03-29 PROBLEM — I50.30 (HFPEF) HEART FAILURE WITH PRESERVED EJECTION FRACTION: Status: ACTIVE | Noted: 2021-03-29

## 2021-03-29 PROCEDURE — 99499 RISK ADDL DX/OHS AUDIT: ICD-10-PCS | Mod: S$GLB,,, | Performed by: INTERNAL MEDICINE

## 2021-03-29 PROCEDURE — 1101F PR PT FALLS ASSESS DOC 0-1 FALLS W/OUT INJ PAST YR: ICD-10-PCS | Mod: CPTII,S$GLB,, | Performed by: INTERNAL MEDICINE

## 2021-03-29 PROCEDURE — 99214 OFFICE O/P EST MOD 30 MIN: CPT | Mod: S$GLB,,, | Performed by: INTERNAL MEDICINE

## 2021-03-29 PROCEDURE — 1126F PR PAIN SEVERITY QUANTIFIED, NO PAIN PRESENT: ICD-10-PCS | Mod: S$GLB,,, | Performed by: INTERNAL MEDICINE

## 2021-03-29 PROCEDURE — 99999 PR PBB SHADOW E&M-EST. PATIENT-LVL IV: CPT | Mod: PBBFAC,,, | Performed by: INTERNAL MEDICINE

## 2021-03-29 PROCEDURE — 3288F PR FALLS RISK ASSESSMENT DOCUMENTED: ICD-10-PCS | Mod: CPTII,S$GLB,, | Performed by: INTERNAL MEDICINE

## 2021-03-29 PROCEDURE — 3051F PR MOST RECENT HEMOGLOBIN A1C LEVEL 7.0 - < 8.0%: ICD-10-PCS | Mod: CPTII,S$GLB,, | Performed by: INTERNAL MEDICINE

## 2021-03-29 PROCEDURE — 99499 UNLISTED E&M SERVICE: CPT | Mod: S$GLB,,, | Performed by: INTERNAL MEDICINE

## 2021-03-29 PROCEDURE — 3288F FALL RISK ASSESSMENT DOCD: CPT | Mod: CPTII,S$GLB,, | Performed by: INTERNAL MEDICINE

## 2021-03-29 PROCEDURE — 3051F HG A1C>EQUAL 7.0%<8.0%: CPT | Mod: CPTII,S$GLB,, | Performed by: INTERNAL MEDICINE

## 2021-03-29 PROCEDURE — 1101F PT FALLS ASSESS-DOCD LE1/YR: CPT | Mod: CPTII,S$GLB,, | Performed by: INTERNAL MEDICINE

## 2021-03-29 PROCEDURE — 99214 PR OFFICE/OUTPT VISIT, EST, LEVL IV, 30-39 MIN: ICD-10-PCS | Mod: S$GLB,,, | Performed by: INTERNAL MEDICINE

## 2021-03-29 PROCEDURE — 1126F AMNT PAIN NOTED NONE PRSNT: CPT | Mod: S$GLB,,, | Performed by: INTERNAL MEDICINE

## 2021-03-29 PROCEDURE — 99999 PR PBB SHADOW E&M-EST. PATIENT-LVL IV: ICD-10-PCS | Mod: PBBFAC,,, | Performed by: INTERNAL MEDICINE

## 2021-03-29 RX ORDER — EMPAGLIFLOZIN 25 MG/1
25 TABLET, FILM COATED ORAL DAILY
Qty: 90 TABLET | Refills: 1 | Status: SHIPPED | OUTPATIENT
Start: 2021-03-29 | End: 2021-06-30

## 2021-03-29 RX ORDER — VIT A/VIT C/VIT E/ZINC/COPPER 4296-226
1 CAPSULE ORAL DAILY
COMMUNITY

## 2021-03-29 RX ORDER — EPINEPHRINE 0.22MG
100 AEROSOL WITH ADAPTER (ML) INHALATION DAILY
COMMUNITY
End: 2022-07-22

## 2021-03-29 RX ORDER — AMOXICILLIN 500 MG
1 CAPSULE ORAL 2 TIMES DAILY
Qty: 180 CAPSULE | Refills: 1 | Status: SHIPPED | OUTPATIENT
Start: 2021-03-29 | End: 2021-05-06

## 2021-04-09 ENCOUNTER — TELEPHONE (OUTPATIENT)
Dept: FAMILY MEDICINE | Facility: CLINIC | Age: 76
End: 2021-04-09

## 2021-04-15 ENCOUNTER — TELEPHONE (OUTPATIENT)
Dept: FAMILY MEDICINE | Facility: CLINIC | Age: 76
End: 2021-04-15

## 2021-04-19 ENCOUNTER — PES CALL (OUTPATIENT)
Dept: ADMINISTRATIVE | Facility: CLINIC | Age: 76
End: 2021-04-19

## 2021-04-19 ENCOUNTER — TELEPHONE (OUTPATIENT)
Dept: FAMILY MEDICINE | Facility: CLINIC | Age: 76
End: 2021-04-19

## 2021-04-20 ENCOUNTER — TELEPHONE (OUTPATIENT)
Dept: FAMILY MEDICINE | Facility: CLINIC | Age: 76
End: 2021-04-20

## 2021-04-27 ENCOUNTER — TELEPHONE (OUTPATIENT)
Dept: DERMATOLOGY | Facility: CLINIC | Age: 76
End: 2021-04-27

## 2021-05-05 ENCOUNTER — TELEPHONE (OUTPATIENT)
Dept: DERMATOLOGY | Facility: CLINIC | Age: 76
End: 2021-05-05

## 2021-05-10 ENCOUNTER — TELEPHONE (OUTPATIENT)
Dept: ADMINISTRATIVE | Facility: HOSPITAL | Age: 76
End: 2021-05-10

## 2021-06-14 ENCOUNTER — OFFICE VISIT (OUTPATIENT)
Dept: OTOLARYNGOLOGY | Facility: CLINIC | Age: 76
End: 2021-06-14
Payer: MEDICARE

## 2021-06-14 VITALS — HEIGHT: 61 IN | BODY MASS INDEX: 23.55 KG/M2 | WEIGHT: 124.75 LBS

## 2021-06-14 DIAGNOSIS — C44.311 BASAL CELL CARCINOMA (BCC) OF SUPRATIP OF NOSE: Primary | ICD-10-CM

## 2021-06-14 DIAGNOSIS — Z79.01 LONG TERM (CURRENT) USE OF ANTICOAGULANTS: ICD-10-CM

## 2021-06-14 PROCEDURE — 1126F PR PAIN SEVERITY QUANTIFIED, NO PAIN PRESENT: ICD-10-PCS | Mod: S$GLB,,, | Performed by: OTOLARYNGOLOGY

## 2021-06-14 PROCEDURE — 1159F PR MEDICATION LIST DOCUMENTED IN MEDICAL RECORD: ICD-10-PCS | Mod: S$GLB,,, | Performed by: OTOLARYNGOLOGY

## 2021-06-14 PROCEDURE — 1101F PR PT FALLS ASSESS DOC 0-1 FALLS W/OUT INJ PAST YR: ICD-10-PCS | Mod: CPTII,S$GLB,, | Performed by: OTOLARYNGOLOGY

## 2021-06-14 PROCEDURE — 3288F PR FALLS RISK ASSESSMENT DOCUMENTED: ICD-10-PCS | Mod: CPTII,S$GLB,, | Performed by: OTOLARYNGOLOGY

## 2021-06-14 PROCEDURE — 1101F PT FALLS ASSESS-DOCD LE1/YR: CPT | Mod: CPTII,S$GLB,, | Performed by: OTOLARYNGOLOGY

## 2021-06-14 PROCEDURE — 99999 PR PBB SHADOW E&M-EST. PATIENT-LVL III: CPT | Mod: PBBFAC,,, | Performed by: OTOLARYNGOLOGY

## 2021-06-14 PROCEDURE — 99204 OFFICE O/P NEW MOD 45 MIN: CPT | Mod: S$GLB,,, | Performed by: OTOLARYNGOLOGY

## 2021-06-14 PROCEDURE — 99204 PR OFFICE/OUTPT VISIT, NEW, LEVL IV, 45-59 MIN: ICD-10-PCS | Mod: S$GLB,,, | Performed by: OTOLARYNGOLOGY

## 2021-06-14 PROCEDURE — 3288F FALL RISK ASSESSMENT DOCD: CPT | Mod: CPTII,S$GLB,, | Performed by: OTOLARYNGOLOGY

## 2021-06-14 PROCEDURE — 1126F AMNT PAIN NOTED NONE PRSNT: CPT | Mod: S$GLB,,, | Performed by: OTOLARYNGOLOGY

## 2021-06-14 PROCEDURE — 99999 PR PBB SHADOW E&M-EST. PATIENT-LVL III: ICD-10-PCS | Mod: PBBFAC,,, | Performed by: OTOLARYNGOLOGY

## 2021-06-14 PROCEDURE — 1159F MED LIST DOCD IN RCRD: CPT | Mod: S$GLB,,, | Performed by: OTOLARYNGOLOGY

## 2021-06-22 ENCOUNTER — TELEPHONE (OUTPATIENT)
Dept: DERMATOLOGY | Facility: CLINIC | Age: 76
End: 2021-06-22

## 2021-06-28 ENCOUNTER — OFFICE VISIT (OUTPATIENT)
Dept: FAMILY MEDICINE | Facility: CLINIC | Age: 76
End: 2021-06-28
Payer: MEDICARE

## 2021-06-28 VITALS
TEMPERATURE: 97 F | HEIGHT: 61 IN | BODY MASS INDEX: 23.41 KG/M2 | OXYGEN SATURATION: 97 % | SYSTOLIC BLOOD PRESSURE: 128 MMHG | WEIGHT: 124 LBS | DIASTOLIC BLOOD PRESSURE: 65 MMHG | HEART RATE: 66 BPM

## 2021-06-28 DIAGNOSIS — E78.5 HYPERLIPIDEMIA ASSOCIATED WITH TYPE 2 DIABETES MELLITUS: Chronic | ICD-10-CM

## 2021-06-28 DIAGNOSIS — E11.69 HYPERLIPIDEMIA ASSOCIATED WITH TYPE 2 DIABETES MELLITUS: Chronic | ICD-10-CM

## 2021-06-28 DIAGNOSIS — Z00.00 ENCOUNTER FOR PREVENTIVE CARE: Primary | ICD-10-CM

## 2021-06-28 DIAGNOSIS — H25.13 NS (NUCLEAR SCLEROSIS), BILATERAL: Chronic | ICD-10-CM

## 2021-06-28 DIAGNOSIS — I25.10 CORONARY ARTERY DISEASE INVOLVING NATIVE CORONARY ARTERY OF NATIVE HEART WITHOUT ANGINA PECTORIS: Chronic | ICD-10-CM

## 2021-06-28 DIAGNOSIS — Z95.1 STATUS POST AORTO-CORONARY ARTERY BYPASS GRAFT: Chronic | ICD-10-CM

## 2021-06-28 DIAGNOSIS — R91.8 MULTIPLE PULMONARY NODULES: Chronic | ICD-10-CM

## 2021-06-28 DIAGNOSIS — D75.89 MACROCYTOSIS WITHOUT ANEMIA: ICD-10-CM

## 2021-06-28 DIAGNOSIS — H35.3132 NONEXUDATIVE AGE-RELATED MACULAR DEGENERATION, BILATERAL, INTERMEDIATE DRY STAGE: Chronic | ICD-10-CM

## 2021-06-28 DIAGNOSIS — E11.59 HYPERTENSION ASSOCIATED WITH DIABETES: Chronic | ICD-10-CM

## 2021-06-28 DIAGNOSIS — H35.52 RETINITIS PIGMENTOSA, BOTH EYES: Chronic | ICD-10-CM

## 2021-06-28 DIAGNOSIS — E11.65 TYPE 2 DIABETES MELLITUS WITH HYPERGLYCEMIA, WITHOUT LONG-TERM CURRENT USE OF INSULIN: Chronic | ICD-10-CM

## 2021-06-28 DIAGNOSIS — I50.32 CHRONIC HEART FAILURE WITH PRESERVED EJECTION FRACTION: Chronic | ICD-10-CM

## 2021-06-28 DIAGNOSIS — I15.2 HYPERTENSION ASSOCIATED WITH DIABETES: Chronic | ICD-10-CM

## 2021-06-28 PROCEDURE — 1126F PR PAIN SEVERITY QUANTIFIED, NO PAIN PRESENT: ICD-10-PCS | Mod: S$GLB,,, | Performed by: NURSE PRACTITIONER

## 2021-06-28 PROCEDURE — 3051F PR MOST RECENT HEMOGLOBIN A1C LEVEL 7.0 - < 8.0%: ICD-10-PCS | Mod: CPTII,S$GLB,, | Performed by: NURSE PRACTITIONER

## 2021-06-28 PROCEDURE — 99999 PR PBB SHADOW E&M-EST. PATIENT-LVL IV: CPT | Mod: PBBFAC,,, | Performed by: NURSE PRACTITIONER

## 2021-06-28 PROCEDURE — G9920 SCRNING PERF AND NEGATIVE: HCPCS | Mod: CPTII,S$GLB,, | Performed by: NURSE PRACTITIONER

## 2021-06-28 PROCEDURE — G0439 PR MEDICARE ANNUAL WELLNESS SUBSEQUENT VISIT: ICD-10-PCS | Mod: S$GLB,,, | Performed by: NURSE PRACTITIONER

## 2021-06-28 PROCEDURE — 3051F HG A1C>EQUAL 7.0%<8.0%: CPT | Mod: CPTII,S$GLB,, | Performed by: NURSE PRACTITIONER

## 2021-06-28 PROCEDURE — 99999 PR PBB SHADOW E&M-EST. PATIENT-LVL IV: ICD-10-PCS | Mod: PBBFAC,,, | Performed by: NURSE PRACTITIONER

## 2021-06-28 PROCEDURE — 1126F AMNT PAIN NOTED NONE PRSNT: CPT | Mod: S$GLB,,, | Performed by: NURSE PRACTITIONER

## 2021-06-28 PROCEDURE — G0439 PPPS, SUBSEQ VISIT: HCPCS | Mod: S$GLB,,, | Performed by: NURSE PRACTITIONER

## 2021-06-28 PROCEDURE — 3288F PR FALLS RISK ASSESSMENT DOCUMENTED: ICD-10-PCS | Mod: CPTII,S$GLB,, | Performed by: NURSE PRACTITIONER

## 2021-06-28 PROCEDURE — 1101F PR PT FALLS ASSESS DOC 0-1 FALLS W/OUT INJ PAST YR: ICD-10-PCS | Mod: CPTII,S$GLB,, | Performed by: NURSE PRACTITIONER

## 2021-06-28 PROCEDURE — 3288F FALL RISK ASSESSMENT DOCD: CPT | Mod: CPTII,S$GLB,, | Performed by: NURSE PRACTITIONER

## 2021-06-28 PROCEDURE — G9920 PR SCREENING AND NEGATIVE: ICD-10-PCS | Mod: CPTII,S$GLB,, | Performed by: NURSE PRACTITIONER

## 2021-06-28 PROCEDURE — 1101F PT FALLS ASSESS-DOCD LE1/YR: CPT | Mod: CPTII,S$GLB,, | Performed by: NURSE PRACTITIONER

## 2021-07-28 DIAGNOSIS — I15.2 HYPERTENSION ASSOCIATED WITH DIABETES: ICD-10-CM

## 2021-07-28 DIAGNOSIS — R91.8 MULTIPLE PULMONARY NODULES: Chronic | ICD-10-CM

## 2021-07-28 DIAGNOSIS — I50.22 CHRONIC SYSTOLIC HEART FAILURE: Chronic | ICD-10-CM

## 2021-07-28 DIAGNOSIS — Z79.02 ANTIPLATELET OR ANTITHROMBOTIC LONG-TERM USE: Chronic | ICD-10-CM

## 2021-07-28 DIAGNOSIS — Z79.899 LONG TERM CURRENT USE OF DIURETIC: Chronic | ICD-10-CM

## 2021-07-28 DIAGNOSIS — Z13.21 ENCOUNTER FOR VITAMIN DEFICIENCY SCREENING: ICD-10-CM

## 2021-07-28 DIAGNOSIS — E11.9 TYPE 2 DIABETES MELLITUS WITHOUT COMPLICATION, WITHOUT LONG-TERM CURRENT USE OF INSULIN: Chronic | ICD-10-CM

## 2021-07-28 DIAGNOSIS — E11.69 HYPERLIPIDEMIA ASSOCIATED WITH TYPE 2 DIABETES MELLITUS: Chronic | ICD-10-CM

## 2021-07-28 DIAGNOSIS — Z13.89 SCREENING FOR HEMATURIA OR PROTEINURIA: ICD-10-CM

## 2021-07-28 DIAGNOSIS — R79.89 LOW VITAMIN B12 LEVEL: ICD-10-CM

## 2021-07-28 DIAGNOSIS — E78.5 HYPERLIPIDEMIA ASSOCIATED WITH TYPE 2 DIABETES MELLITUS: Chronic | ICD-10-CM

## 2021-07-28 DIAGNOSIS — Z95.1 STATUS POST AORTO-CORONARY ARTERY BYPASS GRAFT: Chronic | ICD-10-CM

## 2021-07-28 DIAGNOSIS — E11.59 HYPERTENSION ASSOCIATED WITH DIABETES: ICD-10-CM

## 2021-07-28 DIAGNOSIS — I25.5 ISCHEMIC CARDIOMYOPATHY: Chronic | ICD-10-CM

## 2021-07-28 DIAGNOSIS — Z13.0 SCREENING FOR DEFICIENCY ANEMIA: ICD-10-CM

## 2021-07-28 DIAGNOSIS — Z79.82 ASPIRIN LONG-TERM USE: Chronic | ICD-10-CM

## 2021-07-28 DIAGNOSIS — Z13.29 SCREENING FOR THYROID DISORDER: ICD-10-CM

## 2021-07-28 DIAGNOSIS — I25.10 CORONARY ARTERY DISEASE INVOLVING NATIVE CORONARY ARTERY OF NATIVE HEART WITHOUT ANGINA PECTORIS: Chronic | ICD-10-CM

## 2021-07-28 RX ORDER — FUROSEMIDE 20 MG/1
20 TABLET ORAL DAILY
Qty: 90 TABLET | Refills: 1 | Status: SHIPPED | OUTPATIENT
Start: 2021-07-28 | End: 2021-10-27

## 2021-08-03 ENCOUNTER — PATIENT OUTREACH (OUTPATIENT)
Dept: ADMINISTRATIVE | Facility: OTHER | Age: 76
End: 2021-08-03

## 2021-08-05 ENCOUNTER — OFFICE VISIT (OUTPATIENT)
Dept: DERMATOLOGY | Facility: CLINIC | Age: 76
End: 2021-08-05
Payer: MEDICARE

## 2021-08-05 VITALS — BODY MASS INDEX: 23.39 KG/M2 | WEIGHT: 123.88 LBS | RESPIRATION RATE: 18 BRPM | HEIGHT: 61 IN

## 2021-08-05 DIAGNOSIS — T49.95XA DERMATITIS MEDICAMENTOSA DUE TO DRUG APPLIED TO SKIN: Primary | ICD-10-CM

## 2021-08-05 DIAGNOSIS — L25.1 DERMATITIS MEDICAMENTOSA DUE TO DRUG APPLIED TO SKIN: Primary | ICD-10-CM

## 2021-08-05 DIAGNOSIS — C44.311 BASAL CELL CARCINOMA OF NOSE: ICD-10-CM

## 2021-08-05 PROCEDURE — 1101F PT FALLS ASSESS-DOCD LE1/YR: CPT | Mod: CPTII,S$GLB,, | Performed by: DERMATOLOGY

## 2021-08-05 PROCEDURE — 3288F FALL RISK ASSESSMENT DOCD: CPT | Mod: CPTII,S$GLB,, | Performed by: DERMATOLOGY

## 2021-08-05 PROCEDURE — 99499 UNLISTED E&M SERVICE: CPT | Mod: S$GLB,,, | Performed by: DERMATOLOGY

## 2021-08-05 PROCEDURE — 1159F MED LIST DOCD IN RCRD: CPT | Mod: CPTII,S$GLB,, | Performed by: DERMATOLOGY

## 2021-08-05 PROCEDURE — 1126F PR PAIN SEVERITY QUANTIFIED, NO PAIN PRESENT: ICD-10-PCS | Mod: CPTII,S$GLB,, | Performed by: DERMATOLOGY

## 2021-08-05 PROCEDURE — 3288F PR FALLS RISK ASSESSMENT DOCUMENTED: ICD-10-PCS | Mod: CPTII,S$GLB,, | Performed by: DERMATOLOGY

## 2021-08-05 PROCEDURE — 99999 PR PBB SHADOW E&M-EST. PATIENT-LVL III: ICD-10-PCS | Mod: PBBFAC,,, | Performed by: DERMATOLOGY

## 2021-08-05 PROCEDURE — 1126F AMNT PAIN NOTED NONE PRSNT: CPT | Mod: CPTII,S$GLB,, | Performed by: DERMATOLOGY

## 2021-08-05 PROCEDURE — 1101F PR PT FALLS ASSESS DOC 0-1 FALLS W/OUT INJ PAST YR: ICD-10-PCS | Mod: CPTII,S$GLB,, | Performed by: DERMATOLOGY

## 2021-08-05 PROCEDURE — 1160F PR REVIEW ALL MEDS BY PRESCRIBER/CLIN PHARMACIST DOCUMENTED: ICD-10-PCS | Mod: CPTII,S$GLB,, | Performed by: DERMATOLOGY

## 2021-08-05 PROCEDURE — 99499 RISK ADDL DX/OHS AUDIT: ICD-10-PCS | Mod: S$GLB,,, | Performed by: DERMATOLOGY

## 2021-08-05 PROCEDURE — 1160F RVW MEDS BY RX/DR IN RCRD: CPT | Mod: CPTII,S$GLB,, | Performed by: DERMATOLOGY

## 2021-08-05 PROCEDURE — 99212 PR OFFICE/OUTPT VISIT, EST, LEVL II, 10-19 MIN: ICD-10-PCS | Mod: S$GLB,,, | Performed by: DERMATOLOGY

## 2021-08-05 PROCEDURE — 99999 PR PBB SHADOW E&M-EST. PATIENT-LVL III: CPT | Mod: PBBFAC,,, | Performed by: DERMATOLOGY

## 2021-08-05 PROCEDURE — 99212 OFFICE O/P EST SF 10 MIN: CPT | Mod: S$GLB,,, | Performed by: DERMATOLOGY

## 2021-08-05 PROCEDURE — 1159F PR MEDICATION LIST DOCUMENTED IN MEDICAL RECORD: ICD-10-PCS | Mod: CPTII,S$GLB,, | Performed by: DERMATOLOGY

## 2021-08-10 ENCOUNTER — TELEPHONE (OUTPATIENT)
Dept: DERMATOLOGY | Facility: CLINIC | Age: 76
End: 2021-08-10

## 2021-08-10 ENCOUNTER — PATIENT MESSAGE (OUTPATIENT)
Dept: FAMILY MEDICINE | Facility: CLINIC | Age: 76
End: 2021-08-10

## 2021-08-10 ENCOUNTER — TELEPHONE (OUTPATIENT)
Dept: FAMILY MEDICINE | Facility: CLINIC | Age: 76
End: 2021-08-10

## 2021-08-17 ENCOUNTER — PATIENT MESSAGE (OUTPATIENT)
Dept: FAMILY MEDICINE | Facility: CLINIC | Age: 76
End: 2021-08-17

## 2021-09-28 DIAGNOSIS — E11.65 TYPE 2 DIABETES MELLITUS WITH HYPERGLYCEMIA, WITHOUT LONG-TERM CURRENT USE OF INSULIN: Chronic | ICD-10-CM

## 2021-09-28 RX ORDER — EMPAGLIFLOZIN 25 MG/1
25 TABLET, FILM COATED ORAL DAILY
Qty: 90 TABLET | Refills: 1 | Status: SHIPPED | OUTPATIENT
Start: 2021-09-28 | End: 2022-01-25 | Stop reason: SDUPTHER

## 2021-10-05 ENCOUNTER — IMMUNIZATION (OUTPATIENT)
Dept: FAMILY MEDICINE | Facility: CLINIC | Age: 76
End: 2021-10-05
Payer: MEDICARE

## 2021-10-05 DIAGNOSIS — Z23 NEED FOR VACCINATION: Primary | ICD-10-CM

## 2021-10-05 PROCEDURE — 91300 COVID-19, MRNA, LNP-S, PF, 30 MCG/0.3 ML DOSE VACCINE: CPT | Mod: HCNC,PBBFAC | Performed by: FAMILY MEDICINE

## 2021-10-05 PROCEDURE — 0003A COVID-19, MRNA, LNP-S, PF, 30 MCG/0.3 ML DOSE VACCINE: CPT | Mod: HCNC,PBBFAC | Performed by: FAMILY MEDICINE

## 2021-10-26 DIAGNOSIS — E78.5 HYPERLIPIDEMIA, UNSPECIFIED HYPERLIPIDEMIA TYPE: ICD-10-CM

## 2021-10-29 DIAGNOSIS — E78.5 HYPERLIPIDEMIA, UNSPECIFIED HYPERLIPIDEMIA TYPE: ICD-10-CM

## 2021-11-01 ENCOUNTER — TELEPHONE (OUTPATIENT)
Dept: DERMATOLOGY | Facility: CLINIC | Age: 76
End: 2021-11-01
Payer: MEDICARE

## 2021-12-06 ENCOUNTER — OFFICE VISIT (OUTPATIENT)
Dept: FAMILY MEDICINE | Facility: CLINIC | Age: 76
End: 2021-12-06
Payer: MEDICARE

## 2021-12-06 ENCOUNTER — LAB VISIT (OUTPATIENT)
Dept: LAB | Facility: HOSPITAL | Age: 76
End: 2021-12-06
Attending: FAMILY MEDICINE
Payer: MEDICARE

## 2021-12-06 VITALS
SYSTOLIC BLOOD PRESSURE: 139 MMHG | BODY MASS INDEX: 23.53 KG/M2 | DIASTOLIC BLOOD PRESSURE: 69 MMHG | HEIGHT: 61 IN | HEART RATE: 66 BPM | TEMPERATURE: 97 F | WEIGHT: 124.63 LBS | OXYGEN SATURATION: 98 %

## 2021-12-06 DIAGNOSIS — I15.2 HYPERTENSION ASSOCIATED WITH DIABETES: ICD-10-CM

## 2021-12-06 DIAGNOSIS — E78.5 HYPERLIPIDEMIA ASSOCIATED WITH TYPE 2 DIABETES MELLITUS: ICD-10-CM

## 2021-12-06 DIAGNOSIS — E11.59 HYPERTENSION ASSOCIATED WITH DIABETES: ICD-10-CM

## 2021-12-06 DIAGNOSIS — E11.69 HYPERLIPIDEMIA ASSOCIATED WITH TYPE 2 DIABETES MELLITUS: ICD-10-CM

## 2021-12-06 DIAGNOSIS — I70.0 ATHEROSCLEROSIS OF AORTA: ICD-10-CM

## 2021-12-06 DIAGNOSIS — E11.65 TYPE 2 DIABETES MELLITUS WITH HYPERGLYCEMIA, WITHOUT LONG-TERM CURRENT USE OF INSULIN: Primary | ICD-10-CM

## 2021-12-06 DIAGNOSIS — E11.65 TYPE 2 DIABETES MELLITUS WITH HYPERGLYCEMIA, WITHOUT LONG-TERM CURRENT USE OF INSULIN: ICD-10-CM

## 2021-12-06 DIAGNOSIS — Z79.899 ENCOUNTER FOR LONG-TERM (CURRENT) USE OF MEDICATIONS: ICD-10-CM

## 2021-12-06 LAB
ANION GAP SERPL CALC-SCNC: 11 MMOL/L (ref 8–16)
BUN SERPL-MCNC: 24 MG/DL (ref 8–23)
CALCIUM SERPL-MCNC: 10.3 MG/DL (ref 8.7–10.5)
CHLORIDE SERPL-SCNC: 103 MMOL/L (ref 95–110)
CO2 SERPL-SCNC: 27 MMOL/L (ref 23–29)
CREAT SERPL-MCNC: 0.8 MG/DL (ref 0.5–1.4)
EST. GFR  (AFRICAN AMERICAN): >60 ML/MIN/1.73 M^2
EST. GFR  (NON AFRICAN AMERICAN): >60 ML/MIN/1.73 M^2
ESTIMATED AVG GLUCOSE: 154 MG/DL (ref 68–131)
GLUCOSE SERPL-MCNC: 155 MG/DL (ref 70–110)
HBA1C MFR BLD: 7 % (ref 4–5.6)
POTASSIUM SERPL-SCNC: 4.1 MMOL/L (ref 3.5–5.1)
SODIUM SERPL-SCNC: 141 MMOL/L (ref 136–145)

## 2021-12-06 PROCEDURE — 80048 BASIC METABOLIC PNL TOTAL CA: CPT | Mod: HCNC | Performed by: FAMILY MEDICINE

## 2021-12-06 PROCEDURE — 99999 PR PBB SHADOW E&M-EST. PATIENT-LVL IV: CPT | Mod: PBBFAC,HCNC,, | Performed by: FAMILY MEDICINE

## 2021-12-06 PROCEDURE — 99214 OFFICE O/P EST MOD 30 MIN: CPT | Mod: HCNC,S$GLB,, | Performed by: FAMILY MEDICINE

## 2021-12-06 PROCEDURE — 99499 UNLISTED E&M SERVICE: CPT | Mod: HCNC,S$GLB,, | Performed by: FAMILY MEDICINE

## 2021-12-06 PROCEDURE — 83036 HEMOGLOBIN GLYCOSYLATED A1C: CPT | Mod: HCNC | Performed by: FAMILY MEDICINE

## 2021-12-06 PROCEDURE — 36415 COLL VENOUS BLD VENIPUNCTURE: CPT | Mod: HCNC,PO | Performed by: FAMILY MEDICINE

## 2021-12-06 PROCEDURE — 99499 RISK ADDL DX/OHS AUDIT: ICD-10-PCS | Mod: HCNC,S$GLB,, | Performed by: FAMILY MEDICINE

## 2021-12-06 PROCEDURE — 99999 PR PBB SHADOW E&M-EST. PATIENT-LVL IV: ICD-10-PCS | Mod: PBBFAC,HCNC,, | Performed by: FAMILY MEDICINE

## 2021-12-06 PROCEDURE — 99214 PR OFFICE/OUTPT VISIT, EST, LEVL IV, 30-39 MIN: ICD-10-PCS | Mod: HCNC,S$GLB,, | Performed by: FAMILY MEDICINE

## 2021-12-08 ENCOUNTER — LAB VISIT (OUTPATIENT)
Dept: LAB | Facility: HOSPITAL | Age: 76
End: 2021-12-08
Attending: FAMILY MEDICINE
Payer: MEDICARE

## 2021-12-08 DIAGNOSIS — Z79.899 ENCOUNTER FOR LONG-TERM (CURRENT) USE OF MEDICATIONS: ICD-10-CM

## 2021-12-08 DIAGNOSIS — E11.69 HYPERLIPIDEMIA ASSOCIATED WITH TYPE 2 DIABETES MELLITUS: ICD-10-CM

## 2021-12-08 DIAGNOSIS — E11.65 TYPE 2 DIABETES MELLITUS WITH HYPERGLYCEMIA, WITHOUT LONG-TERM CURRENT USE OF INSULIN: ICD-10-CM

## 2021-12-08 DIAGNOSIS — E11.59 HYPERTENSION ASSOCIATED WITH DIABETES: ICD-10-CM

## 2021-12-08 DIAGNOSIS — E78.5 HYPERLIPIDEMIA ASSOCIATED WITH TYPE 2 DIABETES MELLITUS: ICD-10-CM

## 2021-12-08 DIAGNOSIS — I15.2 HYPERTENSION ASSOCIATED WITH DIABETES: ICD-10-CM

## 2021-12-08 LAB
CHOLEST SERPL-MCNC: 272 MG/DL (ref 120–199)
CHOLEST/HDLC SERPL: 6 {RATIO} (ref 2–5)
HDLC SERPL-MCNC: 45 MG/DL (ref 40–75)
HDLC SERPL: 16.5 % (ref 20–50)
LDLC SERPL CALC-MCNC: 160.4 MG/DL (ref 63–159)
NONHDLC SERPL-MCNC: 227 MG/DL
TRIGL SERPL-MCNC: 333 MG/DL (ref 30–150)

## 2021-12-08 PROCEDURE — 80061 LIPID PANEL: CPT | Mod: HCNC | Performed by: FAMILY MEDICINE

## 2021-12-08 PROCEDURE — 36415 COLL VENOUS BLD VENIPUNCTURE: CPT | Mod: HCNC,PO | Performed by: FAMILY MEDICINE

## 2021-12-10 ENCOUNTER — TELEPHONE (OUTPATIENT)
Dept: FAMILY MEDICINE | Facility: CLINIC | Age: 76
End: 2021-12-10
Payer: MEDICARE

## 2021-12-10 DIAGNOSIS — E11.69 HYPERLIPIDEMIA ASSOCIATED WITH TYPE 2 DIABETES MELLITUS: Primary | ICD-10-CM

## 2021-12-10 DIAGNOSIS — E78.5 HYPERLIPIDEMIA ASSOCIATED WITH TYPE 2 DIABETES MELLITUS: Primary | ICD-10-CM

## 2021-12-10 RX ORDER — PRAVASTATIN SODIUM 20 MG/1
20 TABLET ORAL DAILY
Qty: 90 TABLET | Refills: 4 | Status: SHIPPED | OUTPATIENT
Start: 2021-12-10 | End: 2022-07-22

## 2021-12-28 ENCOUNTER — CLINICAL SUPPORT (OUTPATIENT)
Dept: FAMILY MEDICINE | Facility: CLINIC | Age: 76
End: 2021-12-28
Payer: MEDICARE

## 2021-12-28 ENCOUNTER — TELEPHONE (OUTPATIENT)
Dept: FAMILY MEDICINE | Facility: CLINIC | Age: 76
End: 2021-12-28
Payer: MEDICARE

## 2021-12-28 DIAGNOSIS — R05.9 COUGH: ICD-10-CM

## 2021-12-28 DIAGNOSIS — R68.89 FLU-LIKE SYMPTOMS: ICD-10-CM

## 2021-12-28 DIAGNOSIS — R68.89 FLU-LIKE SYMPTOMS: Primary | ICD-10-CM

## 2021-12-28 LAB
INFLUENZA A, MOLECULAR: NEGATIVE
INFLUENZA B, MOLECULAR: NEGATIVE
SPECIMEN SOURCE: NORMAL

## 2021-12-28 PROCEDURE — U0003 INFECTIOUS AGENT DETECTION BY NUCLEIC ACID (DNA OR RNA); SEVERE ACUTE RESPIRATORY SYNDROME CORONAVIRUS 2 (SARS-COV-2) (CORONAVIRUS DISEASE [COVID-19]), AMPLIFIED PROBE TECHNIQUE, MAKING USE OF HIGH THROUGHPUT TECHNOLOGIES AS DESCRIBED BY CMS-2020-01-R: HCPCS | Mod: HCNC | Performed by: FAMILY MEDICINE

## 2021-12-28 PROCEDURE — 87502 INFLUENZA DNA AMP PROBE: CPT | Mod: HCNC,PO | Performed by: FAMILY MEDICINE

## 2021-12-28 PROCEDURE — U0005 INFEC AGEN DETEC AMPLI PROBE: HCPCS | Performed by: FAMILY MEDICINE

## 2021-12-30 ENCOUNTER — PATIENT MESSAGE (OUTPATIENT)
Dept: FAMILY MEDICINE | Facility: CLINIC | Age: 76
End: 2021-12-30
Payer: MEDICARE

## 2021-12-30 DIAGNOSIS — R05.9 COUGH: Primary | ICD-10-CM

## 2021-12-30 DIAGNOSIS — U07.1 LAB TEST POSITIVE FOR DETECTION OF COVID-19 VIRUS: Primary | ICD-10-CM

## 2021-12-30 LAB
SARS-COV-2 RNA RESP QL NAA+PROBE: DETECTED
SARS-COV-2- CYCLE NUMBER: 5

## 2021-12-30 NOTE — TELEPHONE ENCOUNTER
COVID-19 test positive.  Please check to see how patient is doing.  Order placed for home monitoring program.  Patient risk score 6.  If eligible (Covid risk score 3 or greater on a scale of 0-16) see if the patient is interested in monoclonal antibody infusion.  This can decrease the risk of serious infection and hospitalization.  Supplies are limited and higher risk patients are prioritized first for infusion.  My nurse will contact you to review.  Thanks,  Dr. Contreras

## 2021-12-31 ENCOUNTER — INFUSION (OUTPATIENT)
Dept: INFECTIOUS DISEASES | Facility: HOSPITAL | Age: 76
End: 2021-12-31
Attending: NURSE PRACTITIONER
Payer: MEDICARE

## 2021-12-31 VITALS
HEART RATE: 56 BPM | DIASTOLIC BLOOD PRESSURE: 70 MMHG | OXYGEN SATURATION: 99 % | TEMPERATURE: 98 F | RESPIRATION RATE: 18 BRPM | SYSTOLIC BLOOD PRESSURE: 157 MMHG

## 2021-12-31 DIAGNOSIS — U07.1 LAB TEST POSITIVE FOR DETECTION OF COVID-19 VIRUS: ICD-10-CM

## 2021-12-31 PROCEDURE — 63600175 PHARM REV CODE 636 W HCPCS: Mod: HCNC | Performed by: NURSE PRACTITIONER

## 2021-12-31 PROCEDURE — M0243 CASIRIVI AND IMDEVI INFUSION: HCPCS | Performed by: NURSE PRACTITIONER

## 2021-12-31 PROCEDURE — 25000003 PHARM REV CODE 250: Mod: HCNC | Performed by: NURSE PRACTITIONER

## 2021-12-31 RX ORDER — DIPHENHYDRAMINE HYDROCHLORIDE 50 MG/ML
25 INJECTION INTRAMUSCULAR; INTRAVENOUS ONCE AS NEEDED
Status: DISCONTINUED | OUTPATIENT
Start: 2021-12-31 | End: 2022-07-22

## 2021-12-31 RX ORDER — ALBUTEROL SULFATE 90 UG/1
2 AEROSOL, METERED RESPIRATORY (INHALATION)
Status: DISCONTINUED | OUTPATIENT
Start: 2021-12-31 | End: 2022-07-22

## 2021-12-31 RX ORDER — EPINEPHRINE 0.3 MG/.3ML
0.3 INJECTION SUBCUTANEOUS
Status: DISCONTINUED | OUTPATIENT
Start: 2021-12-31 | End: 2022-07-22

## 2021-12-31 RX ORDER — ONDANSETRON 2 MG/ML
4 INJECTION INTRAMUSCULAR; INTRAVENOUS ONCE AS NEEDED
Status: DISCONTINUED | OUTPATIENT
Start: 2021-12-31 | End: 2022-07-22

## 2021-12-31 RX ORDER — ACETAMINOPHEN 325 MG/1
650 TABLET ORAL ONCE AS NEEDED
Status: DISCONTINUED | OUTPATIENT
Start: 2021-12-31 | End: 2022-07-22

## 2021-12-31 RX ORDER — SODIUM CHLORIDE 0.9 % (FLUSH) 0.9 %
10 SYRINGE (ML) INJECTION
Status: DISCONTINUED | OUTPATIENT
Start: 2021-12-31 | End: 2022-07-22

## 2021-12-31 RX ADMIN — CASIRIVIMAB AND IMDEVIMAB 600 MG: 600; 600 INJECTION, SOLUTION, CONCENTRATE INTRAVENOUS at 10:12

## 2021-12-31 RX ADMIN — SODIUM CHLORIDE: 0.9 INJECTION, SOLUTION INTRAVENOUS at 10:12

## 2022-01-03 ENCOUNTER — TELEPHONE (OUTPATIENT)
Dept: DERMATOLOGY | Facility: CLINIC | Age: 77
End: 2022-01-03
Payer: MEDICARE

## 2022-01-25 DIAGNOSIS — E11.65 TYPE 2 DIABETES MELLITUS WITH HYPERGLYCEMIA, WITHOUT LONG-TERM CURRENT USE OF INSULIN: Chronic | ICD-10-CM

## 2022-01-25 RX ORDER — EMPAGLIFLOZIN 25 MG/1
25 TABLET, FILM COATED ORAL DAILY
Qty: 90 TABLET | Refills: 1 | Status: SHIPPED | OUTPATIENT
Start: 2022-01-25 | End: 2022-06-28

## 2022-01-25 RX ORDER — FLUTICASONE PROPIONATE 50 MCG
2 SPRAY, SUSPENSION (ML) NASAL DAILY
Qty: 16 G | Refills: 6 | Status: SHIPPED | OUTPATIENT
Start: 2022-01-25 | End: 2023-05-25

## 2022-01-25 NOTE — TELEPHONE ENCOUNTER
----- Message from Pablito Jean MD sent at 1/25/2022 11:02 AM CST -----  Regarding: RE: refill  Contact: pt    ----- Message -----  From: Dinora Sen  Sent: 1/25/2022  10:43 AM CST  To: Pablito Jean MD  Subject: refill                                           What is the name of the medication you are requesting? Flonase nasal spray  What is the dose?  How do you take the medication? Orally, Topically, etc?  How often do you take this medication?  Do you need a 30 day or 90 day supply?  How many refills are you requesting?  What is your preferred pharmacy and location of pharmacy? [facility]  Who can we contact with further questions? Pt [phone]       What is the name of the medication you are requesting? Jordiance   What is the dose? 25mg  How do you take the medication? Orally, Topically, etc?  How often do you take this medication?  Do you need a 30 day or 90 day supply? 90  How many refills are you requesting?  What is your preferred pharmacy and location of pharmacy?  Who can we contact with further questions?

## 2022-01-25 NOTE — TELEPHONE ENCOUNTER
Care Due:                  Date            Visit Type   Department     Provider  --------------------------------------------------------------------------------                                             [facility]  Last Visit: 12-      None         ROJELIO Jean                                           [facility]  Next Visit: 06-      None         ROJELIO Jean                                                            Last  Test          Frequency    Reason                     Performed    Due Date  --------------------------------------------------------------------------------    CMP.........  12 months..  pravastatin..............  Not Found    Overdue    Powered by [employer]. Reference number: [id number].   1/25/2022 11:12:19 AM CST

## 2022-03-10 ENCOUNTER — OFFICE VISIT (OUTPATIENT)
Dept: DERMATOLOGY | Facility: CLINIC | Age: 77
End: 2022-03-10
Payer: MEDICARE

## 2022-03-10 DIAGNOSIS — C44.311 BASAL CELL CARCINOMA OF NOSE: Primary | ICD-10-CM

## 2022-03-10 PROCEDURE — 1159F PR MEDICATION LIST DOCUMENTED IN MEDICAL RECORD: ICD-10-PCS | Mod: CPTII,S$GLB,, | Performed by: DERMATOLOGY

## 2022-03-10 PROCEDURE — 99999 PR PBB SHADOW E&M-EST. PATIENT-LVL III: ICD-10-PCS | Mod: PBBFAC,,, | Performed by: DERMATOLOGY

## 2022-03-10 PROCEDURE — 1160F RVW MEDS BY RX/DR IN RCRD: CPT | Mod: CPTII,S$GLB,, | Performed by: DERMATOLOGY

## 2022-03-10 PROCEDURE — 1101F PR PT FALLS ASSESS DOC 0-1 FALLS W/OUT INJ PAST YR: ICD-10-PCS | Mod: CPTII,S$GLB,, | Performed by: DERMATOLOGY

## 2022-03-10 PROCEDURE — 1101F PT FALLS ASSESS-DOCD LE1/YR: CPT | Mod: CPTII,S$GLB,, | Performed by: DERMATOLOGY

## 2022-03-10 PROCEDURE — 1126F PR PAIN SEVERITY QUANTIFIED, NO PAIN PRESENT: ICD-10-PCS | Mod: CPTII,S$GLB,, | Performed by: DERMATOLOGY

## 2022-03-10 PROCEDURE — 1159F MED LIST DOCD IN RCRD: CPT | Mod: CPTII,S$GLB,, | Performed by: DERMATOLOGY

## 2022-03-10 PROCEDURE — 1160F PR REVIEW ALL MEDS BY PRESCRIBER/CLIN PHARMACIST DOCUMENTED: ICD-10-PCS | Mod: CPTII,S$GLB,, | Performed by: DERMATOLOGY

## 2022-03-10 PROCEDURE — 3288F FALL RISK ASSESSMENT DOCD: CPT | Mod: CPTII,S$GLB,, | Performed by: DERMATOLOGY

## 2022-03-10 PROCEDURE — 99212 OFFICE O/P EST SF 10 MIN: CPT | Mod: S$GLB,,, | Performed by: DERMATOLOGY

## 2022-03-10 PROCEDURE — 3288F PR FALLS RISK ASSESSMENT DOCUMENTED: ICD-10-PCS | Mod: CPTII,S$GLB,, | Performed by: DERMATOLOGY

## 2022-03-10 PROCEDURE — 3072F LOW RISK FOR RETINOPATHY: CPT | Mod: CPTII,S$GLB,, | Performed by: DERMATOLOGY

## 2022-03-10 PROCEDURE — 3072F PR LOW RISK FOR RETINOPATHY: ICD-10-PCS | Mod: CPTII,S$GLB,, | Performed by: DERMATOLOGY

## 2022-03-10 PROCEDURE — 99999 PR PBB SHADOW E&M-EST. PATIENT-LVL III: CPT | Mod: PBBFAC,,, | Performed by: DERMATOLOGY

## 2022-03-10 PROCEDURE — 1126F AMNT PAIN NOTED NONE PRSNT: CPT | Mod: CPTII,S$GLB,, | Performed by: DERMATOLOGY

## 2022-03-10 PROCEDURE — 99212 PR OFFICE/OUTPT VISIT, EST, LEVL II, 10-19 MIN: ICD-10-PCS | Mod: S$GLB,,, | Performed by: DERMATOLOGY

## 2022-03-10 RX ORDER — FLUOROURACIL 50 MG/G
CREAM TOPICAL
Qty: 40 G | Refills: 0 | Status: SHIPPED | OUTPATIENT
Start: 2022-03-10 | End: 2022-07-22

## 2022-03-10 NOTE — PROGRESS NOTES
"ALLERGIES:  Rosuvastatin    CHIEF COMPLAINT: followup basal cell carcinoma    HISTORY OF PRESENT ILLNESS:  Location: nose tip  Timing: I last saw her 7 month(s) ago  Quality: unchanged  Context: biopsy/biopsies performed Sept 2020 by Dr. Hagan  pathology showed basal cell carcinoma as noted below  at her previous visits, there has been no clearcut evidence of residual tumor, and after discussing options, we had decided to defer treatment and observe  she returns today for followup/re-evaluation    From last note:  Apparently used efudex recently to the area  saw Dr. Lawrence 6/14   Excerpt from his note:   "Saumya is here for evaluation of BCC of left nasal supratrip.   The lesion has been present for year(s). no pain, norapid growth in a short time.   She saw Dr. Elaine last November and was scheduled to see me, but decided to have her cataract surgery prior. She is here today for evaluation. She feels the lesion that was biopsied 6 months ago is not present anymore, but she does have a lesion more central to the area in question today.  There is positive history of previous skin cancer, nasal dorsum and forehead. She reports having a bad experience   She does have history of bilobed flap of nasal dorsum and forehead and she was not pleased with forehead results."    PATH:   1.  Skin, left nasal supratip, shave biopsy:   - BASAL CELL CARCINOMA.   - THE TUMOR EXTENDS TO THE DEEP AND LATERAL BIOPSY MARGINS.   MICROSCOPIC DESCRIPTION: Sections show a basaloid tumor within the dermis   exhibiting peripheral palisading, retraction artifact and apoptosis.    PERTINENT MEDICATIONS:  See medications list.      Current Outpatient Medications:     blood sugar diagnostic Strp, TEST BLOOD SUGAR ONCE DAILY, Disp: 300 each, Rfl: 3    clopidogreL (PLAVIX) 75 mg tablet, Take 75 mg by mouth once daily. , Disp: , Rfl:     coenzyme Q10 100 mg capsule, Take 100 mg by mouth once daily., Disp: , Rfl:     empagliflozin " (JARDIANCE) 25 mg tablet, Take 1 tablet (25 mg total) by mouth once daily., Disp: 90 tablet, Rfl: 1    fluticasone propionate (FLONASE) 50 mcg/actuation nasal spray, 2 sprays (100 mcg total) by Each Nostril route once daily., Disp: 16 g, Rfl: 6    furosemide (LASIX) 20 MG tablet, Take 1 tablet (20 mg total) by mouth once daily., Disp: 90 tablet, Rfl: 1    Lactobacillus rhamnosus GG (CULTURELLE) 10 billion cell capsule, Take 1 capsule by mouth once daily., Disp: , Rfl:     metoprolol succinate (TOPROL-XL) 100 MG 24 hr tablet, Take 1 tablet (100 mg total) by mouth daily, Disp: , Rfl:     multivitamin capsule, Take 1 capsule by mouth once daily., Disp:  , Rfl:     omega-3 acid ethyl esters (LOVAZA) 1 gram capsule, Take 1 capsule by mouth once daily., Disp: , Rfl:     pravastatin (PRAVACHOL) 20 MG tablet, Take 1 tablet (20 mg total) by mouth once daily., Disp: 90 tablet, Rfl: 4    vitamins  A,C,E-zinc-copper (PRESERVISION AREDS) 14,320-226-200 unit-mg-unit Cap, Take 1 capsule by mouth once daily., Disp: , Rfl:     fluorouraciL (EFUDEX) 5 % cream, AAA bid x 2 weeks, Disp: 40 g, Rfl: 0    Current Facility-Administered Medications:     acetaminophen tablet 650 mg, 650 mg, Oral, Once PRN, Cristina Valle, NP    albuterol inhaler 2 puff, 2 puff, Inhalation, Q20 Min PRN, Cristina Valle, NP    diphenhydrAMINE injection 25 mg, 25 mg, Intravenous, Once PRN, Cristina Valle NP    EPINEPHrine (EPIPEN) 0.3 mg/0.3 mL pen injection 0.3 mg, 0.3 mg, Intramuscular, PRN, Cristina Valle, NP    methylPREDNISolone sodium succinate injection 40 mg, 40 mg, Intravenous, Once PRN, Cristina Valle, NP    ondansetron injection 4 mg, 4 mg, Intravenous, Once PRN, Cristina Valle, NP    sodium chloride 0.9% 500 mL flush bag, , Intravenous, PRN, Cristina Valle, NP, Stopped at 12/31/21 1213    sodium chloride 0.9% flush 10 mL, 10 mL, Intravenous, PRN, Cristina Valle, NP     EXAM:  Constitutional  General  appearance: well-developed, well-nourished, well-kempt older white female    Neurologic/Psychiatric  Alert,  normal orientation to time, place, person  Normal mood and affect with no evidence of depression, anxiety, agitation  Skin: see photo(s)  Head: background moderate solar damage to exposed areas of skin  site(s) confirmed by reference to the photograph(s) attached below taken at the time of the biopsy/biopsies by the referring physician   inspection/palpation reveals some residual erythema to the lower nose but no definitive evidence of residual/recurrent basal cell carcinoma at this time    Photo(s) today      See prior photos in previous note(s) below    ASSESSMENT:   Status post biopsy of basal cell carcinoma on the nose by Dr. Hagan  chronic solar damage to areas as noted above  personal history of non-melanoma skin cancer    PLAN:  The current clinical findings and options, including re-biopsy or topical treatment with Efudex, were discussed at length with the patient.    After discussion of the above, she has elected to proceed with topical treatment with Efudex.    Sufficient time was available for questions, and all questions were answered to her satisfaction. She fully understands the aims, risks, alternatives, and possible complications, and has elected to proceed with in this manner.    I reviewed the use of the medication and the anticipated effects.    She is to followup in two week(s) for assessment of the treatment, and to call PRN sooner with any questions or problems.  ---------------------  Note: Some or all of this note may have been generated using voice recognition software. There may be voice recognition errors including grammatical and/or spelling errors found in the text. Attempts were made to correct these errors prior to signature.     ============================================================  PREVIOUS NOTE(S):  Note of 8/5   ALLERGIES:  Patient has no known allergies.    CHIEF  "COMPLAINT: followup basal cell carcinoma on the nose    The patient is accompanied to this visit by her .    HISTORY OF PRESENT ILLNESS:  Location: nose  Timing: I last saw her in November 2020    Context: prior biopsy in 9/2020 showed basal cell carcinoma; see below  Apparently used efudex recently to the area  saw Dr. Lawrence 6/14   Excerpt from his note:   "Saumya is here for evaluation of BCC of left nasal supratrip.   The lesion has been present for year(s). no pain, norapid growth in a short time.   She saw Dr. Elaine last November and was scheduled to see me, but decided to have her cataract surgery prior. She is here today for evaluation. She feels the lesion that was biopsied 6 months ago is not present anymore, but she does have a lesion more central to the area in question today.  There is positive history of previous skin cancer, nasal dorsum and forehead. She reports having a bad experience   She does have history of bilobed flap of nasal dorsum and forehead and she was not pleased with forehead results."    Quality: has some crusting after recent Efudex use to the area    PATH:   1.  Skin, left nasal supratip, shave biopsy:   - BASAL CELL CARCINOMA.   - THE TUMOR EXTENDS TO THE DEEP AND LATERAL BIOPSY MARGINS.   MICROSCOPIC DESCRIPTION: Sections show a basaloid tumor within the dermis   exhibiting peripheral palisading, retraction artifact and apoptosis.    REVIEW OF SYSTEMS:   Reviewed ROS from 11/2020; see below    PAST MEDICAL HISTORY:  Past Medical History:   Diagnosis Date    Cataract     OU    Chronic heart failure with preserved ejection fraction 3/29/2021    Chronic systolic heart failure     Coronary artery disease involving native coronary artery of native heart without angina pectoris 4/7/2020    Hypertension associated with diabetes 12/8/2015    Ischemic cardiomyopathy     Macrocytosis without anemia 3/26/2021    Mixed hyperlipidemia     Multiple pulmonary nodules " 3/26/2020    NSTEMI (non-ST elevated myocardial infarction) 3/26/2020    Post-menopause on HRT (hormone replacement therapy) 9/17/2020    Squamous cell carcinoma of skin     right scalp and mid nasal bridge       PAST SURGICAL HISTORY:  Past Surgical History:   Procedure Laterality Date    CARDIAC CATHETERIZATION  04/02/2020    [place]:  Dr Viera    CATARACT EXTRACTION, BILATERAL      CORONARY ARTERY BYPASS GRAFT  06/10/2020    HYSTERECTOMY      age 38 TAHBSO on patch for 27 years    SKIN BIOPSY         SOCIAL HISTORY:  Social History     Tobacco Use    Smoking status: Never Smoker    Smokeless tobacco: Never Used   Substance Use Topics    Alcohol use: No    Drug use: Never        PERTINENT MEDICATIONS:  See medications list.      Current Outpatient Medications:     blood sugar diagnostic Strp, TEST BLOOD SUGAR ONCE DAILY, Disp: 300 each, Rfl: 3    clopidogreL (PLAVIX) 75 mg tablet, Take 75 mg by mouth once daily. , Disp: , Rfl:     coenzyme Q10 (CO Q-10) 100 mg capsule, Take 100 mg by mouth once daily., Disp: , Rfl:     fluticasone (FLONASE) 50 mcg/actuation nasal spray, 2 sprays (100 mcg total) by Each Nare route once daily., Disp: 16 g, Rfl: 6    furosemide (LASIX) 20 MG tablet, Take 1 tablet (20 mg total) by mouth once daily., Disp: 90 tablet, Rfl: 1    JARDIANCE 25 mg tablet, Take 1 tablet (25 mg total) by mouth once daily., Disp: 90 tablet, Rfl: 1    Lactobacillus rhamnosus GG (CULTURELLE) 10 billion cell capsule, Take 1 capsule by mouth once daily., Disp: , Rfl:     metoprolol succinate (TOPROL-XL) 100 MG 24 hr tablet, Take 1 tablet (100 mg total) by mouth daily, Disp: , Rfl:     multivitamin capsule, Take 1 capsule by mouth once daily., Disp:  , Rfl:     omega-3 acid ethyl esters (LOVAZA) 1 gram capsule, Take 1 capsule by mouth once daily., Disp: , Rfl:     rosuvastatin (CRESTOR) 20 MG tablet, Take 1 tablet (20 mg total) by mouth once daily., Disp: 90 tablet, Rfl: 1    vitamins   A,C,E-zinc-copper (PRESERVISION AREDS) 14,320-226-200 unit-mg-unit Cap, Take 1 capsule by mouth once daily., Disp: , Rfl:        EXAM:  Constitutional  General appearance: well-developed, well-nourished, well-kempt older white female    Neurologic/Psychiatric  Alert,  normal orientation to time, place, person  Normal mood and affect with no evidence of depression, anxiety, agitation  Skin: see photo(s)  Head: background moderate solar damage to exposed areas of skin  There is an approx 8 mm area of erosion/crusting to the mid lower nose  This appears to be medial to the site of the previous biopsy  See photos below    Photo(s) this visit:       Photo(s) from biopsy visit - see below    ASSESSMENT:   Status post biopsy, basal cell carcinoma, left lower nose; pending treatment  There is no clear cut evidence of residual basal cell carcinoma to the left lower nose site at present  Efudex-induced dermatitis medicamentosa, mid lower nose    PLAN:  The diagnosis and management options, and risks and benefits of the alternatives were discussed with the patient and her .  Under the circumstances, it is difficult to assess the current changes.  I will have her discontinue use of Efudex, and follow-up in 3 months for reassessment.  --------------------------------------  Note: Some or all of this note may have been generated using voice recognition software. There may be voice recognition errors including grammatical and/or spelling errors found in the text. Attempts were made to correct these errors prior to signature.   ============================================================  PREVIOUS NOTE(S):  Note of 11/05/2020     ALLERGIES:  Patient has no known allergies.  CHIEF COMPLAINT:  This 75 y.o. female comes for evaluation for Mohs' Micrographic Surgery, Fresh Tissue Technique, for treatment of a biopsy-proven basal cell carcinoma on the left nasal supratip. Consultation requested by Chhaya Hagan M.D..    The  patient is accompanied to this visit by her .    HISTORY OF PRESENT ILLNESS:   Location: let nasal supratip  Duration: unknown  Quality: better at present  Context: status post biopsy by Chhaya Hagan M.D.; path = basal cell carcinoma; pathology accession #[id number], Pathology [facility]     Prior Treatment: none  See also the handwritten notes/diagrams scanned to chart for additional details.    Defibrillator: No  Pacemaker: No  Artificial heart valves: No  Artificial joints: No    REVIEW OF SYSTEMS:   General: general health good  Skin: previous skin cancer(s) Yes   If yes, details: SCC scalp and nose, had mohs surgery by Dr. Biggs  Relevant other:  No   Cardiovascular:   High Blood Pressure: Yes   Chest Pain:  No   Defibrillator: as above  Pacemaker: as above  Artificial heart valves: as above  Prior Endocarditis: No   Prior Heart Attack/MI: No     If yes, when:   Prior Cardiac Bypass or Stents:  Yes   If yes, when: Quad by pass 6-  Mitral Valve Prolapse: No   Relevant other:  No   Respiratory:   Shortness of breath:  No   Relevant other:  No   Endocrine:   Diabetes:  No   Relevant other:  No   Hem/Lymph:   Taking Prescribed Blood Thinners:  Yes  Plavix  Easy Bleeding:  Yes   Relevant other:  No   Allergy/Immuno: as noted above  Relevant other:  No   GI:   Prior Hepatitis:  No     If yes, details:   Relevant other:  No   Musculoskeletal:   Artificial joints: as above  Relevant other:  No   Neurologic:   Prior Stroke:  No     If yes, details:   Relevant other:  No   Relevant other info:  Yes  Ischemic cardiomyopathy     PAST MEDICAL HISTORY:  Past Medical History:   Diagnosis Date    Cataract     OU    Coronary artery disease involving native coronary artery of native heart without angina pectoris 4/7/2020    Hypertension associated with diabetes 12/8/2015    Ischemic cardiomyopathy     Mixed hyperlipidemia     Multiple pulmonary nodules 3/26/2020    NSTEMI (non-ST elevated myocardial  infarction) 3/26/2020    Squamous cell carcinoma of skin     right scalp and mid nasal bridge       PAST SURGICAL HISTORY:  Past Surgical History:   Procedure Laterality Date    CARDIAC CATHETERIZATION  04/02/2020    [place]:  Dr Viera    CORONARY ARTERY BYPASS GRAFT  06/10/2020    HYSTERECTOMY      age 38 TAHBSO on patch for 27 years    SKIN BIOPSY          SOCIAL HISTORY:  Dependencies: smoking status as noted below  Social History     Tobacco Use    Smoking status: Never Smoker    Smokeless tobacco: Never Used   Substance Use Topics    Alcohol use: No    Drug use: Never       PERTINENT MEDICATIONS:  See medications list.    Current Outpatient Medications:     blood sugar diagnostic Strp, TEST BLOOD SUGAR ONCE DAILY, Disp: 300 each, Rfl: 3    clopidogreL (PLAVIX) 75 mg tablet, Take 75 mg by mouth once daily. , Disp: , Rfl:     co-enzyme Q-10 50 mg capsule, Take 2 capsules (100 mg total) by mouth once daily., Disp:  , Rfl:     fluticasone (FLONASE) 50 mcg/actuation nasal spray, 2 sprays (100 mcg total) by Each Nare route once daily., Disp: 16 g, Rfl: 6    furosemide (LASIX) 20 MG tablet, Take 1 tablet (20 mg total) by mouth once daily., Disp: 90 tablet, Rfl: 3    JARDIANCE 10 mg tablet, TAKE 1 TABLET BY MOUTH 1 TIME A DAY, Disp: 30 tablet, Rfl: 3    metoprolol succinate (TOPROL-XL) 100 MG 24 hr tablet, Take 1 tablet (100 mg total) by mouth daily, Disp: , Rfl:     multivitamin capsule, Take 1 capsule by mouth once daily., Disp:  , Rfl:     omega-3 fatty acids/fish oil (FISH OIL-OMEGA-3 FATTY ACIDS) 300-1,000 mg capsule, Take 1 capsule by mouth once daily., Disp:  , Rfl:     rosuvastatin (CRESTOR) 20 MG tablet, Take 1 tablet (20 mg total) by mouth once daily., Disp: 90 tablet, Rfl: 0    triamcinolone acetonide 0.1% (KENALOG) 0.1 % cream, Apply topically 2 (two) times daily. Apply for 2 weeks, then take a break for 2 weeks. Repeat as needed., Disp: 45 g, Rfl: 1    aspirin 81 MG Chew, Take 81 mg  by mouth once daily., Disp: , Rfl:     estradiol (VIVELLE-DOT) 0.1 mg/24 hr PTSW, APPLY 1 PATCH ONTO SKIN TWO TIMES A WEEK (Patient not taking: Reported on 11/5/2020), Disp: 8 patch, Rfl: 11    Lactobacillus rhamnosus GG (CULTURELLE) 10 billion cell capsule, Take 1 capsule by mouth once daily., Disp: , Rfl:     paroxetine (PAXIL) 10 MG tablet, Take 1 tablet (10 mg total) by mouth once daily. (Patient not taking: Reported on 11/5/2020), Disp: 30 tablet, Rfl: 2    ALLERGIES:  Patient has no known allergies.    EXAM:  See also the handwritten notes/diagrams scanned to chart for additional details.  Constitutional  General appearance: well-developed, well-nourished, well-kempt older white female    Eyes  Inspection of conjunctivae and lids reveals no abnormalities; sclerae anicteric  Neurologic/Psychiatric  Alert,  normal orientation to time, place, person  Normal mood and affect with no evidence of depression, anxiety, agitation  Skin: see photo(s)  Head: background moderate solar damage to exposed areas of skin; in addition, inspection/palpation reveals an ill-defined, approximately 6-8 mm pink biopsy site on the lower nose; site(s) confirmed by reference to the photograph(s) attached below taken at the time of the biopsy/biopsies by the referring physician and she confirmed this as the site of the prior biopsy  Neck: examination reveals moderate chronic solar damage  Right upper extremity: examination reveals moderate chronic solar damage  Left upper extremity: examination reveals moderate chronic solar damage    Photo(s) this visit:       Photo(s) from biopsy visit:      ASSESSMENT: biopsy-proven basal cell carcinoma of the lower nose  chronic solar damage to areas as noted above  personal history of non-melanoma skin cancer    PLAN:  The diagnosis and management options, and risks and benefits of the alternatives, including observation/non-treatment, radiation treatment, excision with vertical frozen section or  paraffin-embedded section margin evaluation, and Mohs' Micrographic Surgery, Fresh Tissue Technique, were discussed at length with the patient and her . In particular, the discussion included, but was not limited to, the following:    One alternative at this point would be to defer further treatment and observe the lesion. With small skin cancers of this kind, it is possible that a biopsy can be sufficient to definitively treat a small skin cancer of this kind. Alternatively, some skin cancers are slow growing and do not require immediate treatment. The potential advantage of this choice would be to avoid the need for possibly unnecessary additional surgery. Among the potential disadvantages of this would be the possibility of enlargement of the lesion, more extensive spread of the lesion or recurrence at a later date, which might necessitate a larger and more complex surgery.    Radiation treatment can be an effective treatment for this type of skin cancer. The usual course of treatment is every weekday for several weeks. Local irritation will result from treatment, although no systemic side effects are expected. The potential advantage of radiation treatment is that it avoids the need for surgery. Among the disadvantages of radiation treatment are the length of treatment, the local inflammatory response, the absence of pathologic confirmation of the removal of the skin cancer, a possible increased risk of additional skin cancer in the treated area in later years, and a somewhat increased risk of recurrence at a later date.     Excisional surgery can be an effective treatment for this type of skin cancer. This would involve excision of the lesion with margin evaluation by submitting the specimen to a pathologist for either immediate marginal assessment via frozen section processing, or delayed marginal assessment by fixed-tissue processing. The potential advantage of this technique is that it offers a way of  treating the lesion with some degree of histologic confirmation of tumor removal. Among the disadvantages of this treatment are the possible need for re-excision if marginal involvement is identified, a somewhat greater likelihood of recurrence as compared to Mohs' surgery because of the less comprehensive margin evaluation inherent in the technique, and the general potential risks of surgery, including allergic reactions to the anesthetic and other materials used, infection, injury to nerves in the area with consequent loss of sensation or muscle function, and scarring or distortion of surrounding structures.    Mohs' surgery is a very effective treatment for this type of skin cancer. The potential advantage of Mohs' surgery is that this technique offers the greatest possible certainty of knowing that the skin cancer has been completely removed, with the removal of the least amount of normal tissue. The potential disadvantages of Mohs' surgery include the duration of the surgery, the possible need for a separate surgery for reconstruction following tumor removal, and scarring as a result. In addition, general potential risks of surgery as noted above also apply to treatment via Mohs' surgery.    In light of the nature of this tumor and the location  On the nose in an area of increased risk of recurrence,  Mohs' micrographic surgery was thought to be the most appropriate management choice, and this diagnosis is appropriate for treatment by Mohs' micrographic surgery.     We also discussed options for repair of the site to follow tumor removal via Mohs' surgery, including the participation of a reconstructive surgeon to reconstruct the resultant wound. Given the location, the anticipated size and potential complexity of the defect to follow tumor removal via Mohs' surgery, reconstruction will be coordinated with Arjun Lawrence MD.  I will contact Dr. Lawrence to arrange for the patient to be seen in consultation, and  we will coordinate the surgeries thereafter.    Sufficient time was available for questions, and all questions were answered to her satisfaction. She fully understands the aims, risks, alternatives, and possible complications, and has elected to proceed with the surgery, and verbally consented to do so. The procedure will be scheduled in the near future.    Routine pre-op instructions were given to her.    --------------------------------------  Note: Some or all of this note may have been generated using voice recognition software. There may be voice recognition errors including grammatical and/or spelling errors found in the text. Attempts were made to correct these errors prior to signature.

## 2022-03-14 ENCOUNTER — OFFICE VISIT (OUTPATIENT)
Dept: OPHTHALMOLOGY | Facility: CLINIC | Age: 77
End: 2022-03-14
Payer: MEDICARE

## 2022-03-14 DIAGNOSIS — H35.52 RETINITIS PIGMENTOSA, BOTH EYES: Chronic | ICD-10-CM

## 2022-03-14 DIAGNOSIS — H35.3132 NONEXUDATIVE AGE-RELATED MACULAR DEGENERATION, BILATERAL, INTERMEDIATE DRY STAGE: Primary | Chronic | ICD-10-CM

## 2022-03-14 PROBLEM — H25.13 NS (NUCLEAR SCLEROSIS), BILATERAL: Chronic | Status: RESOLVED | Noted: 2018-03-19 | Resolved: 2022-03-14

## 2022-03-14 PROCEDURE — 3288F FALL RISK ASSESSMENT DOCD: CPT | Mod: CPTII,S$GLB,, | Performed by: OPHTHALMOLOGY

## 2022-03-14 PROCEDURE — 99999 PR PBB SHADOW E&M-EST. PATIENT-LVL IV: ICD-10-PCS | Mod: PBBFAC,,, | Performed by: OPHTHALMOLOGY

## 2022-03-14 PROCEDURE — 1159F PR MEDICATION LIST DOCUMENTED IN MEDICAL RECORD: ICD-10-PCS | Mod: CPTII,S$GLB,, | Performed by: OPHTHALMOLOGY

## 2022-03-14 PROCEDURE — 1101F PR PT FALLS ASSESS DOC 0-1 FALLS W/OUT INJ PAST YR: ICD-10-PCS | Mod: CPTII,S$GLB,, | Performed by: OPHTHALMOLOGY

## 2022-03-14 PROCEDURE — 92134 CPTRZ OPH DX IMG PST SGM RTA: CPT | Mod: S$GLB,,, | Performed by: OPHTHALMOLOGY

## 2022-03-14 PROCEDURE — 92201 PR OPHTHALMOSCOPY, EXT, W/RET DRAW/SCLERAL DEPR, I&R, UNI/BI: ICD-10-PCS | Mod: S$GLB,,, | Performed by: OPHTHALMOLOGY

## 2022-03-14 PROCEDURE — 1159F MED LIST DOCD IN RCRD: CPT | Mod: CPTII,S$GLB,, | Performed by: OPHTHALMOLOGY

## 2022-03-14 PROCEDURE — 92134 POSTERIOR SEGMENT OCT RETINA (OCULAR COHERENCE TOMOGRAPHY)-BOTH EYES: ICD-10-PCS | Mod: S$GLB,,, | Performed by: OPHTHALMOLOGY

## 2022-03-14 PROCEDURE — 1126F AMNT PAIN NOTED NONE PRSNT: CPT | Mod: CPTII,S$GLB,, | Performed by: OPHTHALMOLOGY

## 2022-03-14 PROCEDURE — 99999 PR PBB SHADOW E&M-EST. PATIENT-LVL IV: CPT | Mod: PBBFAC,,, | Performed by: OPHTHALMOLOGY

## 2022-03-14 PROCEDURE — 92014 PR EYE EXAM, EST PATIENT,COMPREHESV: ICD-10-PCS | Mod: S$GLB,,, | Performed by: OPHTHALMOLOGY

## 2022-03-14 PROCEDURE — 3288F PR FALLS RISK ASSESSMENT DOCUMENTED: ICD-10-PCS | Mod: CPTII,S$GLB,, | Performed by: OPHTHALMOLOGY

## 2022-03-14 PROCEDURE — 1160F PR REVIEW ALL MEDS BY PRESCRIBER/CLIN PHARMACIST DOCUMENTED: ICD-10-PCS | Mod: CPTII,S$GLB,, | Performed by: OPHTHALMOLOGY

## 2022-03-14 PROCEDURE — 1160F RVW MEDS BY RX/DR IN RCRD: CPT | Mod: CPTII,S$GLB,, | Performed by: OPHTHALMOLOGY

## 2022-03-14 PROCEDURE — 92201 OPSCPY EXTND RTA DRAW UNI/BI: CPT | Mod: S$GLB,,, | Performed by: OPHTHALMOLOGY

## 2022-03-14 PROCEDURE — 92014 COMPRE OPH EXAM EST PT 1/>: CPT | Mod: S$GLB,,, | Performed by: OPHTHALMOLOGY

## 2022-03-14 PROCEDURE — 1126F PR PAIN SEVERITY QUANTIFIED, NO PAIN PRESENT: ICD-10-PCS | Mod: CPTII,S$GLB,, | Performed by: OPHTHALMOLOGY

## 2022-03-14 PROCEDURE — 1101F PT FALLS ASSESS-DOCD LE1/YR: CPT | Mod: CPTII,S$GLB,, | Performed by: OPHTHALMOLOGY

## 2022-03-14 RX ORDER — INFLUENZA VACCINE, ADJUVANTED 15; 15; 15; 15 UG/.5ML; UG/.5ML; UG/.5ML; UG/.5ML
INJECTION, SUSPENSION INTRAMUSCULAR
COMMUNITY
Start: 2021-10-20 | End: 2022-07-22

## 2022-03-14 NOTE — PROGRESS NOTES
HPI     Macular Degeneration      Additional comments: Overdue AMD chk              Comments       AT's PRN         OCT - fovea sparing macular atrophy  Some paracentral involvement OS      A/P    1. 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 Dry AMD component  Continue AREDS/AG    3. PCIOL OU  Some PCO OD>OS  Pt ok without glare  Will follow      1 year OCT    Letter to Dr. Soni

## 2022-03-24 ENCOUNTER — OFFICE VISIT (OUTPATIENT)
Dept: DERMATOLOGY | Facility: CLINIC | Age: 77
End: 2022-03-24
Payer: MEDICARE

## 2022-03-24 DIAGNOSIS — L25.1 DERMATITIS MEDICAMENTOSA DUE TO DRUG APPLIED TO SKIN: Primary | ICD-10-CM

## 2022-03-24 DIAGNOSIS — T49.95XA DERMATITIS MEDICAMENTOSA DUE TO DRUG APPLIED TO SKIN: Primary | ICD-10-CM

## 2022-03-24 PROCEDURE — 99212 PR OFFICE/OUTPT VISIT, EST, LEVL II, 10-19 MIN: ICD-10-PCS | Mod: S$GLB,,, | Performed by: DERMATOLOGY

## 2022-03-24 PROCEDURE — 1160F RVW MEDS BY RX/DR IN RCRD: CPT | Mod: CPTII,S$GLB,, | Performed by: DERMATOLOGY

## 2022-03-24 PROCEDURE — 1101F PR PT FALLS ASSESS DOC 0-1 FALLS W/OUT INJ PAST YR: ICD-10-PCS | Mod: CPTII,S$GLB,, | Performed by: DERMATOLOGY

## 2022-03-24 PROCEDURE — 1126F PR PAIN SEVERITY QUANTIFIED, NO PAIN PRESENT: ICD-10-PCS | Mod: CPTII,S$GLB,, | Performed by: DERMATOLOGY

## 2022-03-24 PROCEDURE — 99999 PR PBB SHADOW E&M-EST. PATIENT-LVL III: CPT | Mod: PBBFAC,,, | Performed by: DERMATOLOGY

## 2022-03-24 PROCEDURE — 3288F PR FALLS RISK ASSESSMENT DOCUMENTED: ICD-10-PCS | Mod: CPTII,S$GLB,, | Performed by: DERMATOLOGY

## 2022-03-24 PROCEDURE — 1101F PT FALLS ASSESS-DOCD LE1/YR: CPT | Mod: CPTII,S$GLB,, | Performed by: DERMATOLOGY

## 2022-03-24 PROCEDURE — 1159F MED LIST DOCD IN RCRD: CPT | Mod: CPTII,S$GLB,, | Performed by: DERMATOLOGY

## 2022-03-24 PROCEDURE — 1159F PR MEDICATION LIST DOCUMENTED IN MEDICAL RECORD: ICD-10-PCS | Mod: CPTII,S$GLB,, | Performed by: DERMATOLOGY

## 2022-03-24 PROCEDURE — 1160F PR REVIEW ALL MEDS BY PRESCRIBER/CLIN PHARMACIST DOCUMENTED: ICD-10-PCS | Mod: CPTII,S$GLB,, | Performed by: DERMATOLOGY

## 2022-03-24 PROCEDURE — 3288F FALL RISK ASSESSMENT DOCD: CPT | Mod: CPTII,S$GLB,, | Performed by: DERMATOLOGY

## 2022-03-24 PROCEDURE — 3072F PR LOW RISK FOR RETINOPATHY: ICD-10-PCS | Mod: CPTII,S$GLB,, | Performed by: DERMATOLOGY

## 2022-03-24 PROCEDURE — 99212 OFFICE O/P EST SF 10 MIN: CPT | Mod: S$GLB,,, | Performed by: DERMATOLOGY

## 2022-03-24 PROCEDURE — 99999 PR PBB SHADOW E&M-EST. PATIENT-LVL III: ICD-10-PCS | Mod: PBBFAC,,, | Performed by: DERMATOLOGY

## 2022-03-24 PROCEDURE — 1126F AMNT PAIN NOTED NONE PRSNT: CPT | Mod: CPTII,S$GLB,, | Performed by: DERMATOLOGY

## 2022-03-24 PROCEDURE — 3072F LOW RISK FOR RETINOPATHY: CPT | Mod: CPTII,S$GLB,, | Performed by: DERMATOLOGY

## 2022-03-24 NOTE — PROGRESS NOTES
"CHIEF COMPLAINT: followup treatment with topical fluoruracil    HISTORY OF PRESENT ILLNESS:  Location(s): nose  Timing: I last saw her 2 weeks ago  Duration: has been applying the medication for 2 weeks  Quality: minimal irritation  Context: applying medication BID  See prior notes below    EXAMINATION:  Skin: there are some irregular confetti-like areas of mild erythema; see photo below    Photo(s) this visit:        ASSESSMENT:   Typical dermatitis due to application of fluoruracil    PLAN:  Current status and management options, and risks, benefits, and alternatives were discussed.  continue application of fluorouracil  Followup 3 weeks  Call prn sooner  --------------------------------------  Note: Some or all of this note may have been generated using voice recognition software. There may be voice recognition errors including grammatical and/or spelling errors found in the text. Attempts were made to correct these errors prior to signature.     ============================================================  PREVIOUS NOTE(S):  Note of 3/10      ALLERGIES:  Rosuvastatin    CHIEF COMPLAINT: followup basal cell carcinoma    HISTORY OF PRESENT ILLNESS:  Location: nose tip  Timing: I last saw her 7 month(s) ago  Quality: unchanged  Context: biopsy/biopsies performed Sept 2020 by Dr. Hagan  pathology showed basal cell carcinoma as noted below  at her previous visits, there has been no clearcut evidence of residual tumor, and after discussing options, we had decided to defer treatment and observe  she returns today for followup/re-evaluation    From last note:  Apparently used efudex recently to the area  saw Dr. Lawrence 6/14   Excerpt from his note:   "Saumya is here for evaluation of BCC of left nasal supratrip.   The lesion has been present for year(s). no pain, norapid growth in a short time.   She saw Dr. Elaine last November and was scheduled to see me, but decided to have her cataract surgery prior. She is " "here today for evaluation. She feels the lesion that was biopsied 6 months ago is not present anymore, but she does have a lesion more central to the area in question today.  There is positive history of previous skin cancer, nasal dorsum and forehead. She reports having a bad experience   She does have history of bilobed flap of nasal dorsum and forehead and she was not pleased with forehead results."    PATH:   1.  Skin, left nasal supratip, shave biopsy:   - BASAL CELL CARCINOMA.   - THE TUMOR EXTENDS TO THE DEEP AND LATERAL BIOPSY MARGINS.   MICROSCOPIC DESCRIPTION: Sections show a basaloid tumor within the dermis   exhibiting peripheral palisading, retraction artifact and apoptosis.    PERTINENT MEDICATIONS:  See medications list.      Current Outpatient Medications:     blood sugar diagnostic Strp, TEST BLOOD SUGAR ONCE DAILY, Disp: 300 each, Rfl: 3    clopidogreL (PLAVIX) 75 mg tablet, Take 75 mg by mouth once daily. , Disp: , Rfl:     coenzyme Q10 100 mg capsule, Take 100 mg by mouth once daily., Disp: , Rfl:     empagliflozin (JARDIANCE) 25 mg tablet, Take 1 tablet (25 mg total) by mouth once daily., Disp: 90 tablet, Rfl: 1    fluticasone propionate (FLONASE) 50 mcg/actuation nasal spray, 2 sprays (100 mcg total) by Each Nostril route once daily., Disp: 16 g, Rfl: 6    furosemide (LASIX) 20 MG tablet, Take 1 tablet (20 mg total) by mouth once daily., Disp: 90 tablet, Rfl: 1    Lactobacillus rhamnosus GG (CULTURELLE) 10 billion cell capsule, Take 1 capsule by mouth once daily., Disp: , Rfl:     metoprolol succinate (TOPROL-XL) 100 MG 24 hr tablet, Take 1 tablet (100 mg total) by mouth daily, Disp: , Rfl:     multivitamin capsule, Take 1 capsule by mouth once daily., Disp:  , Rfl:     omega-3 acid ethyl esters (LOVAZA) 1 gram capsule, Take 1 capsule by mouth once daily., Disp: , Rfl:     pravastatin (PRAVACHOL) 20 MG tablet, Take 1 tablet (20 mg total) by mouth once daily., Disp: 90 tablet, Rfl: " 4    vitamins  A,C,E-zinc-copper (PRESERVISION AREDS) 14,320-226-200 unit-mg-unit Cap, Take 1 capsule by mouth once daily., Disp: , Rfl:     fluorouraciL (EFUDEX) 5 % cream, AAA bid x 2 weeks, Disp: 40 g, Rfl: 0    Current Facility-Administered Medications:     acetaminophen tablet 650 mg, 650 mg, Oral, Once PRN, Cristina Valle, NP    albuterol inhaler 2 puff, 2 puff, Inhalation, Q20 Min PRN, Cristina Valle, NP    diphenhydrAMINE injection 25 mg, 25 mg, Intravenous, Once PRN, Cristina Valle, NP    EPINEPHrine (EPIPEN) 0.3 mg/0.3 mL pen injection 0.3 mg, 0.3 mg, Intramuscular, PRN, Cristina Valle, NP    methylPREDNISolone sodium succinate injection 40 mg, 40 mg, Intravenous, Once PRN, Cristina Valle, NP    ondansetron injection 4 mg, 4 mg, Intravenous, Once PRN, Cristina Valle, NP    sodium chloride 0.9% 500 mL flush bag, , Intravenous, PRN, Cristina Valle, NP, Stopped at 12/31/21 1213    sodium chloride 0.9% flush 10 mL, 10 mL, Intravenous, PRN, Cristina Valle, KALIN     EXAM:  Constitutional  General appearance: well-developed, well-nourished, well-kempt older white female    Neurologic/Psychiatric  Alert,  normal orientation to time, place, person  Normal mood and affect with no evidence of depression, anxiety, agitation  Skin: see photo(s)  Head: background moderate solar damage to exposed areas of skin  site(s) confirmed by reference to the photograph(s) attached below taken at the time of the biopsy/biopsies by the referring physician   inspection/palpation reveals some residual erythema to the lower nose but no definitive evidence of residual/recurrent basal cell carcinoma at this time    Photo(s) today      See prior photos in previous note(s) below    ASSESSMENT:   Status post biopsy of basal cell carcinoma on the nose by Dr. Hagan  chronic solar damage to areas as noted above  personal history of non-melanoma skin cancer    PLAN:  The current clinical findings and options,  "including re-biopsy or topical treatment with Efudex, were discussed at length with the patient.    After discussion of the above, she has elected to proceed with topical treatment with Efudex.    Sufficient time was available for questions, and all questions were answered to her satisfaction. She fully understands the aims, risks, alternatives, and possible complications, and has elected to proceed with in this manner.    I reviewed the use of the medication and the anticipated effects.    She is to followup in two week(s) for assessment of the treatment, and to call PRN sooner with any questions or problems.  ---------------------  Note: Some or all of this note may have been generated using voice recognition software. There may be voice recognition errors including grammatical and/or spelling errors found in the text. Attempts were made to correct these errors prior to signature.     ============================================================  PREVIOUS NOTE(S):  Note of 8/5   ALLERGIES:  Patient has no known allergies.    CHIEF COMPLAINT: followup basal cell carcinoma on the nose    The patient is accompanied to this visit by her .    HISTORY OF PRESENT ILLNESS:  Location: nose  Timing: I last saw her in November 2020    Context: prior biopsy in 9/2020 showed basal cell carcinoma; see below  Apparently used efudex recently to the area  saw Dr. Lawrence 6/14   Excerpt from his note:   "Saumya is here for evaluation of BCC of left nasal supratrip.   The lesion has been present for year(s). no pain, norapid growth in a short time.   She saw Dr. Elaine last November and was scheduled to see me, but decided to have her cataract surgery prior. She is here today for evaluation. She feels the lesion that was biopsied 6 months ago is not present anymore, but she does have a lesion more central to the area in question today.  There is positive history of previous skin cancer, nasal dorsum and forehead. She " "reports having a bad experience   She does have history of bilobed flap of nasal dorsum and forehead and she was not pleased with forehead results."    Quality: has some crusting after recent Efudex use to the area    PATH:   1.  Skin, left nasal supratip, shave biopsy:   - BASAL CELL CARCINOMA.   - THE TUMOR EXTENDS TO THE DEEP AND LATERAL BIOPSY MARGINS.   MICROSCOPIC DESCRIPTION: Sections show a basaloid tumor within the dermis   exhibiting peripheral palisading, retraction artifact and apoptosis.    REVIEW OF SYSTEMS:   Reviewed ROS from 11/2020; see below    PAST MEDICAL HISTORY:  Past Medical History:   Diagnosis Date    Cataract     OU    Chronic heart failure with preserved ejection fraction 3/29/2021    Chronic systolic heart failure     Coronary artery disease involving native coronary artery of native heart without angina pectoris 4/7/2020    Hypertension associated with diabetes 12/8/2015    Ischemic cardiomyopathy     Macrocytosis without anemia 3/26/2021    Mixed hyperlipidemia     Multiple pulmonary nodules 3/26/2020    NSTEMI (non-ST elevated myocardial infarction) 3/26/2020    Post-menopause on HRT (hormone replacement therapy) 9/17/2020    Squamous cell carcinoma of skin     right scalp and mid nasal bridge       PAST SURGICAL HISTORY:  Past Surgical History:   Procedure Laterality Date    CARDIAC CATHETERIZATION  04/02/2020    [place]:  Dr Viera    CATARACT EXTRACTION, BILATERAL      CORONARY ARTERY BYPASS GRAFT  06/10/2020    HYSTERECTOMY      age 38 TAHBSO on patch for 27 years    SKIN BIOPSY         SOCIAL HISTORY:  Social History     Tobacco Use    Smoking status: Never Smoker    Smokeless tobacco: Never Used   Substance Use Topics    Alcohol use: No    Drug use: Never        PERTINENT MEDICATIONS:  See medications list.      Current Outpatient Medications:     blood sugar diagnostic Strp, TEST BLOOD SUGAR ONCE DAILY, Disp: 300 each, Rfl: 3    clopidogreL (PLAVIX) 75 " mg tablet, Take 75 mg by mouth once daily. , Disp: , Rfl:     coenzyme Q10 (CO Q-10) 100 mg capsule, Take 100 mg by mouth once daily., Disp: , Rfl:     fluticasone (FLONASE) 50 mcg/actuation nasal spray, 2 sprays (100 mcg total) by Each Nare route once daily., Disp: 16 g, Rfl: 6    furosemide (LASIX) 20 MG tablet, Take 1 tablet (20 mg total) by mouth once daily., Disp: 90 tablet, Rfl: 1    JARDIANCE 25 mg tablet, Take 1 tablet (25 mg total) by mouth once daily., Disp: 90 tablet, Rfl: 1    Lactobacillus rhamnosus GG (CULTURELLE) 10 billion cell capsule, Take 1 capsule by mouth once daily., Disp: , Rfl:     metoprolol succinate (TOPROL-XL) 100 MG 24 hr tablet, Take 1 tablet (100 mg total) by mouth daily, Disp: , Rfl:     multivitamin capsule, Take 1 capsule by mouth once daily., Disp:  , Rfl:     omega-3 acid ethyl esters (LOVAZA) 1 gram capsule, Take 1 capsule by mouth once daily., Disp: , Rfl:     rosuvastatin (CRESTOR) 20 MG tablet, Take 1 tablet (20 mg total) by mouth once daily., Disp: 90 tablet, Rfl: 1    vitamins  A,C,E-zinc-copper (PRESERVISION AREDS) 14,320-226-200 unit-mg-unit Cap, Take 1 capsule by mouth once daily., Disp: , Rfl:        EXAM:  Constitutional  General appearance: well-developed, well-nourished, well-kempt older white female    Neurologic/Psychiatric  Alert,  normal orientation to time, place, person  Normal mood and affect with no evidence of depression, anxiety, agitation  Skin: see photo(s)  Head: background moderate solar damage to exposed areas of skin  There is an approx 8 mm area of erosion/crusting to the mid lower nose  This appears to be medial to the site of the previous biopsy  See photos below    Photo(s) this visit:       Photo(s) from biopsy visit - see below    ASSESSMENT:   Status post biopsy, basal cell carcinoma, left lower nose; pending treatment  There is no clear cut evidence of residual basal cell carcinoma to the left lower nose site at  present  Efudex-induced dermatitis medicamentosa, mid lower nose    PLAN:  The diagnosis and management options, and risks and benefits of the alternatives were discussed with the patient and her .  Under the circumstances, it is difficult to assess the current changes.  I will have her discontinue use of Efudex, and follow-up in 3 months for reassessment.  --------------------------------------  Note: Some or all of this note may have been generated using voice recognition software. There may be voice recognition errors including grammatical and/or spelling errors found in the text. Attempts were made to correct these errors prior to signature.   ============================================================  PREVIOUS NOTE(S):  Note of 11/05/2020     ALLERGIES:  Patient has no known allergies.  CHIEF COMPLAINT:  This 75 y.o. female comes for evaluation for Mohs' Micrographic Surgery, Fresh Tissue Technique, for treatment of a biopsy-proven basal cell carcinoma on the left nasal supratip. Consultation requested by Chhaya Hagan M.D..    The patient is accompanied to this visit by her .    HISTORY OF PRESENT ILLNESS:   Location: let nasal supratip  Duration: unknown  Quality: better at present  Context: status post biopsy by Chhaya Hagan M.D.; path = basal cell carcinoma; pathology accession #[id number], Pathology [facility]     Prior Treatment: none  See also the handwritten notes/diagrams scanned to chart for additional details.    Defibrillator: No  Pacemaker: No  Artificial heart valves: No  Artificial joints: No    REVIEW OF SYSTEMS:   General: general health good  Skin: previous skin cancer(s) Yes   If yes, details: SCC scalp and nose, had mohs surgery by Dr. Biggs  Relevant other:  No   Cardiovascular:   High Blood Pressure: Yes   Chest Pain:  No   Defibrillator: as above  Pacemaker: as above  Artificial heart valves: as above  Prior Endocarditis: No   Prior Heart Attack/MI: No     If yes, when:    Prior Cardiac Bypass or Stents:  Yes   If yes, when: Quad by pass 6-  Mitral Valve Prolapse: No   Relevant other:  No   Respiratory:   Shortness of breath:  No   Relevant other:  No   Endocrine:   Diabetes:  No   Relevant other:  No   Hem/Lymph:   Taking Prescribed Blood Thinners:  Yes  Plavix  Easy Bleeding:  Yes   Relevant other:  No   Allergy/Immuno: as noted above  Relevant other:  No   GI:   Prior Hepatitis:  No     If yes, details:   Relevant other:  No   Musculoskeletal:   Artificial joints: as above  Relevant other:  No   Neurologic:   Prior Stroke:  No     If yes, details:   Relevant other:  No   Relevant other info:  Yes  Ischemic cardiomyopathy     PAST MEDICAL HISTORY:  Past Medical History:   Diagnosis Date    Cataract     OU    Coronary artery disease involving native coronary artery of native heart without angina pectoris 4/7/2020    Hypertension associated with diabetes 12/8/2015    Ischemic cardiomyopathy     Mixed hyperlipidemia     Multiple pulmonary nodules 3/26/2020    NSTEMI (non-ST elevated myocardial infarction) 3/26/2020    Squamous cell carcinoma of skin     right scalp and mid nasal bridge       PAST SURGICAL HISTORY:  Past Surgical History:   Procedure Laterality Date    CARDIAC CATHETERIZATION  04/02/2020    [place]:  Dr Viera    CORONARY ARTERY BYPASS GRAFT  06/10/2020    HYSTERECTOMY      age 38 TAHBSO on patch for 27 years    SKIN BIOPSY          SOCIAL HISTORY:  Dependencies: smoking status as noted below  Social History     Tobacco Use    Smoking status: Never Smoker    Smokeless tobacco: Never Used   Substance Use Topics    Alcohol use: No    Drug use: Never       PERTINENT MEDICATIONS:  See medications list.    Current Outpatient Medications:     blood sugar diagnostic Strp, TEST BLOOD SUGAR ONCE DAILY, Disp: 300 each, Rfl: 3    clopidogreL (PLAVIX) 75 mg tablet, Take 75 mg by mouth once daily. , Disp: , Rfl:     co-enzyme Q-10 50 mg capsule, Take 2  capsules (100 mg total) by mouth once daily., Disp:  , Rfl:     fluticasone (FLONASE) 50 mcg/actuation nasal spray, 2 sprays (100 mcg total) by Each Nare route once daily., Disp: 16 g, Rfl: 6    furosemide (LASIX) 20 MG tablet, Take 1 tablet (20 mg total) by mouth once daily., Disp: 90 tablet, Rfl: 3    JARDIANCE 10 mg tablet, TAKE 1 TABLET BY MOUTH 1 TIME A DAY, Disp: 30 tablet, Rfl: 3    metoprolol succinate (TOPROL-XL) 100 MG 24 hr tablet, Take 1 tablet (100 mg total) by mouth daily, Disp: , Rfl:     multivitamin capsule, Take 1 capsule by mouth once daily., Disp:  , Rfl:     omega-3 fatty acids/fish oil (FISH OIL-OMEGA-3 FATTY ACIDS) 300-1,000 mg capsule, Take 1 capsule by mouth once daily., Disp:  , Rfl:     rosuvastatin (CRESTOR) 20 MG tablet, Take 1 tablet (20 mg total) by mouth once daily., Disp: 90 tablet, Rfl: 0    triamcinolone acetonide 0.1% (KENALOG) 0.1 % cream, Apply topically 2 (two) times daily. Apply for 2 weeks, then take a break for 2 weeks. Repeat as needed., Disp: 45 g, Rfl: 1    aspirin 81 MG Chew, Take 81 mg by mouth once daily., Disp: , Rfl:     estradiol (VIVELLE-DOT) 0.1 mg/24 hr PTSW, APPLY 1 PATCH ONTO SKIN TWO TIMES A WEEK (Patient not taking: Reported on 11/5/2020), Disp: 8 patch, Rfl: 11    Lactobacillus rhamnosus GG (CULTURELLE) 10 billion cell capsule, Take 1 capsule by mouth once daily., Disp: , Rfl:     paroxetine (PAXIL) 10 MG tablet, Take 1 tablet (10 mg total) by mouth once daily. (Patient not taking: Reported on 11/5/2020), Disp: 30 tablet, Rfl: 2    ALLERGIES:  Patient has no known allergies.    EXAM:  See also the handwritten notes/diagrams scanned to chart for additional details.  Constitutional  General appearance: well-developed, well-nourished, well-kempt older white female    Eyes  Inspection of conjunctivae and lids reveals no abnormalities; sclerae anicteric  Neurologic/Psychiatric  Alert,  normal orientation to time, place, person  Normal mood and affect  with no evidence of depression, anxiety, agitation  Skin: see photo(s)  Head: background moderate solar damage to exposed areas of skin; in addition, inspection/palpation reveals an ill-defined, approximately 6-8 mm pink biopsy site on the lower nose; site(s) confirmed by reference to the photograph(s) attached below taken at the time of the biopsy/biopsies by the referring physician and she confirmed this as the site of the prior biopsy  Neck: examination reveals moderate chronic solar damage  Right upper extremity: examination reveals moderate chronic solar damage  Left upper extremity: examination reveals moderate chronic solar damage    Photo(s) this visit:       Photo(s) from biopsy visit:      ASSESSMENT: biopsy-proven basal cell carcinoma of the lower nose  chronic solar damage to areas as noted above  personal history of non-melanoma skin cancer    PLAN:  The diagnosis and management options, and risks and benefits of the alternatives, including observation/non-treatment, radiation treatment, excision with vertical frozen section or paraffin-embedded section margin evaluation, and Mohs' Micrographic Surgery, Fresh Tissue Technique, were discussed at length with the patient and her . In particular, the discussion included, but was not limited to, the following:    One alternative at this point would be to defer further treatment and observe the lesion. With small skin cancers of this kind, it is possible that a biopsy can be sufficient to definitively treat a small skin cancer of this kind. Alternatively, some skin cancers are slow growing and do not require immediate treatment. The potential advantage of this choice would be to avoid the need for possibly unnecessary additional surgery. Among the potential disadvantages of this would be the possibility of enlargement of the lesion, more extensive spread of the lesion or recurrence at a later date, which might necessitate a larger and more complex  surgery.    Radiation treatment can be an effective treatment for this type of skin cancer. The usual course of treatment is every weekday for several weeks. Local irritation will result from treatment, although no systemic side effects are expected. The potential advantage of radiation treatment is that it avoids the need for surgery. Among the disadvantages of radiation treatment are the length of treatment, the local inflammatory response, the absence of pathologic confirmation of the removal of the skin cancer, a possible increased risk of additional skin cancer in the treated area in later years, and a somewhat increased risk of recurrence at a later date.     Excisional surgery can be an effective treatment for this type of skin cancer. This would involve excision of the lesion with margin evaluation by submitting the specimen to a pathologist for either immediate marginal assessment via frozen section processing, or delayed marginal assessment by fixed-tissue processing. The potential advantage of this technique is that it offers a way of treating the lesion with some degree of histologic confirmation of tumor removal. Among the disadvantages of this treatment are the possible need for re-excision if marginal involvement is identified, a somewhat greater likelihood of recurrence as compared to Mohs' surgery because of the less comprehensive margin evaluation inherent in the technique, and the general potential risks of surgery, including allergic reactions to the anesthetic and other materials used, infection, injury to nerves in the area with consequent loss of sensation or muscle function, and scarring or distortion of surrounding structures.    Mohs' surgery is a very effective treatment for this type of skin cancer. The potential advantage of Mohs' surgery is that this technique offers the greatest possible certainty of knowing that the skin cancer has been completely removed, with the removal of the  least amount of normal tissue. The potential disadvantages of Mohs' surgery include the duration of the surgery, the possible need for a separate surgery for reconstruction following tumor removal, and scarring as a result. In addition, general potential risks of surgery as noted above also apply to treatment via Mohs' surgery.    In light of the nature of this tumor and the location  On the nose in an area of increased risk of recurrence,  Mohs' micrographic surgery was thought to be the most appropriate management choice, and this diagnosis is appropriate for treatment by Mohs' micrographic surgery.     We also discussed options for repair of the site to follow tumor removal via Mohs' surgery, including the participation of a reconstructive surgeon to reconstruct the resultant wound. Given the location, the anticipated size and potential complexity of the defect to follow tumor removal via Mohs' surgery, reconstruction will be coordinated with Arjun Lawrence MD.  I will contact Dr. Lawrence to arrange for the patient to be seen in consultation, and we will coordinate the surgeries thereafter.    Sufficient time was available for questions, and all questions were answered to her satisfaction. She fully understands the aims, risks, alternatives, and possible complications, and has elected to proceed with the surgery, and verbally consented to do so. The procedure will be scheduled in the near future.    Routine pre-op instructions were given to her.    --------------------------------------  Note: Some or all of this note may have been generated using voice recognition software. There may be voice recognition errors including grammatical and/or spelling errors found in the text. Attempts were made to correct these errors prior to signature.

## 2022-04-21 ENCOUNTER — OFFICE VISIT (OUTPATIENT)
Dept: DERMATOLOGY | Facility: CLINIC | Age: 77
End: 2022-04-21
Payer: MEDICARE

## 2022-04-21 DIAGNOSIS — Z85.828 HISTORY OF NONMELANOMA SKIN CANCER: ICD-10-CM

## 2022-04-21 DIAGNOSIS — L25.1 DERMATITIS MEDICAMENTOSA DUE TO DRUG APPLIED TO SKIN: Primary | ICD-10-CM

## 2022-04-21 DIAGNOSIS — T49.95XA DERMATITIS MEDICAMENTOSA DUE TO DRUG APPLIED TO SKIN: Primary | ICD-10-CM

## 2022-04-21 PROCEDURE — 99212 PR OFFICE/OUTPT VISIT, EST, LEVL II, 10-19 MIN: ICD-10-PCS | Mod: S$GLB,,, | Performed by: DERMATOLOGY

## 2022-04-21 PROCEDURE — 99999 PR PBB SHADOW E&M-EST. PATIENT-LVL III: CPT | Mod: PBBFAC,,, | Performed by: DERMATOLOGY

## 2022-04-21 PROCEDURE — 1101F PR PT FALLS ASSESS DOC 0-1 FALLS W/OUT INJ PAST YR: ICD-10-PCS | Mod: CPTII,S$GLB,, | Performed by: DERMATOLOGY

## 2022-04-21 PROCEDURE — 1159F PR MEDICATION LIST DOCUMENTED IN MEDICAL RECORD: ICD-10-PCS | Mod: CPTII,S$GLB,, | Performed by: DERMATOLOGY

## 2022-04-21 PROCEDURE — 1126F AMNT PAIN NOTED NONE PRSNT: CPT | Mod: CPTII,S$GLB,, | Performed by: DERMATOLOGY

## 2022-04-21 PROCEDURE — 99999 PR PBB SHADOW E&M-EST. PATIENT-LVL III: ICD-10-PCS | Mod: PBBFAC,,, | Performed by: DERMATOLOGY

## 2022-04-21 PROCEDURE — 1160F RVW MEDS BY RX/DR IN RCRD: CPT | Mod: CPTII,S$GLB,, | Performed by: DERMATOLOGY

## 2022-04-21 PROCEDURE — 1101F PT FALLS ASSESS-DOCD LE1/YR: CPT | Mod: CPTII,S$GLB,, | Performed by: DERMATOLOGY

## 2022-04-21 PROCEDURE — 3072F PR LOW RISK FOR RETINOPATHY: ICD-10-PCS | Mod: CPTII,S$GLB,, | Performed by: DERMATOLOGY

## 2022-04-21 PROCEDURE — 99212 OFFICE O/P EST SF 10 MIN: CPT | Mod: S$GLB,,, | Performed by: DERMATOLOGY

## 2022-04-21 PROCEDURE — 3288F FALL RISK ASSESSMENT DOCD: CPT | Mod: CPTII,S$GLB,, | Performed by: DERMATOLOGY

## 2022-04-21 PROCEDURE — 3288F PR FALLS RISK ASSESSMENT DOCUMENTED: ICD-10-PCS | Mod: CPTII,S$GLB,, | Performed by: DERMATOLOGY

## 2022-04-21 PROCEDURE — 1160F PR REVIEW ALL MEDS BY PRESCRIBER/CLIN PHARMACIST DOCUMENTED: ICD-10-PCS | Mod: CPTII,S$GLB,, | Performed by: DERMATOLOGY

## 2022-04-21 PROCEDURE — 3072F LOW RISK FOR RETINOPATHY: CPT | Mod: CPTII,S$GLB,, | Performed by: DERMATOLOGY

## 2022-04-21 PROCEDURE — 1159F MED LIST DOCD IN RCRD: CPT | Mod: CPTII,S$GLB,, | Performed by: DERMATOLOGY

## 2022-04-21 PROCEDURE — 1126F PR PAIN SEVERITY QUANTIFIED, NO PAIN PRESENT: ICD-10-PCS | Mod: CPTII,S$GLB,, | Performed by: DERMATOLOGY

## 2022-04-21 NOTE — PROGRESS NOTES
ALLERGIES:  Rosuvastatin    CHIEF COMPLAINT: followup basal cell carcinoma    HISTORY OF PRESENT ILLNESS:  Location: nose  Timing: I last saw her 4 week(s) ago  Quality: better  Context: see prior notes  Prior biopsy showed basal cell carcinoma to the nose tip in Sept 2020; treatment subsequently deferred secondary to no signs of residual tumor   status post 4 weeks of efudex; stopped 2 weeks ago  Had minimal reaction  she returns today for followup/re-evaluation    PATH:      Final Pathologic Diagnosis   Date Value Ref Range Status   09/30/2020   Final    1.  Skin, left nasal supratip, shave biopsy:  - BASAL CELL CARCINOMA.  - THE TUMOR EXTENDS TO THE DEEP AND LATERAL BIOPSY MARGINS.  MICROSCOPIC DESCRIPTION: Sections show a basaloid tumor within the dermis  exhibiting peripheral palisading, retraction artifact and apoptosis.  2.  Skin, right forearm, shave biopsy:  - BASAL CELL CARCINOMA WITH MIXED SUPERFICIAL AND NODULAR GROWTH PATTERN.  - MARGINS ARE NEGATIVE IN THE PLANES OF SECTION.  MICROSCOPIC DESCRIPTION: Sections show multiple foci of basaloid cells, some  of them in apoptosis, arising along the dermoepidermal junction and extending  into the papillary dermis. Nodules of basal cell carcinoma are also noted.       Comment:     Interp By Seven Lira M.D., Signed on 10/05/2020 at 14:47           PERTINENT MEDICATIONS:  See medications list.      Current Outpatient Medications:     blood sugar diagnostic Strp, TEST BLOOD SUGAR ONCE DAILY, Disp: 300 each, Rfl: 3    clopidogreL (PLAVIX) 75 mg tablet, Take 75 mg by mouth once daily. , Disp: , Rfl:     coenzyme Q10 100 mg capsule, Take 100 mg by mouth once daily., Disp: , Rfl:     empagliflozin (JARDIANCE) 25 mg tablet, Take 1 tablet (25 mg total) by mouth once daily., Disp: 90 tablet, Rfl: 1    FLUAD QUAD 2021-22,65Y UP,,PF, 60 mcg (15 mcg x 4)/0.5 mL Syrg, , Disp: , Rfl:     fluorouraciL (EFUDEX) 5 % cream, AAA bid x 2 weeks, Disp: 40 g, Rfl: 0     fluticasone propionate (FLONASE) 50 mcg/actuation nasal spray, 2 sprays (100 mcg total) by Each Nostril route once daily., Disp: 16 g, Rfl: 6    furosemide (LASIX) 20 MG tablet, Take 1 tablet (20 mg total) by mouth once daily., Disp: 90 tablet, Rfl: 1    Lactobacillus rhamnosus GG (CULTURELLE) 10 billion cell capsule, Take 1 capsule by mouth once daily., Disp: , Rfl:     metoprolol succinate (TOPROL-XL) 100 MG 24 hr tablet, Take 1 tablet (100 mg total) by mouth daily, Disp: , Rfl:     multivitamin capsule, Take 1 capsule by mouth once daily., Disp:  , Rfl:     omega-3 acid ethyl esters (LOVAZA) 1 gram capsule, Take 1 capsule by mouth once daily., Disp: , Rfl:     pravastatin (PRAVACHOL) 20 MG tablet, Take 1 tablet (20 mg total) by mouth once daily., Disp: 90 tablet, Rfl: 4    vitamins  A,C,E-zinc-copper (PRESERVISION AREDS) 14,320-226-200 unit-mg-unit Cap, Take 1 capsule by mouth once daily., Disp: , Rfl:     Current Facility-Administered Medications:     acetaminophen tablet 650 mg, 650 mg, Oral, Once PRN, Cristina Valle NP    albuterol inhaler 2 puff, 2 puff, Inhalation, Q20 Min PRN, Cristina Valle, NP    diphenhydrAMINE injection 25 mg, 25 mg, Intravenous, Once PRN, Cristina Valle NP    EPINEPHrine (EPIPEN) 0.3 mg/0.3 mL pen injection 0.3 mg, 0.3 mg, Intramuscular, PRN, Cristina Valle NP    methylPREDNISolone sodium succinate injection 40 mg, 40 mg, Intravenous, Once PRN, Cristina Valle NP    ondansetron injection 4 mg, 4 mg, Intravenous, Once PRN, Cristina Valle, NP    sodium chloride 0.9% 500 mL flush bag, , Intravenous, PRN, Cristina Valle NP, Stopped at 12/31/21 1213    sodium chloride 0.9% flush 10 mL, 10 mL, Intravenous, PRN, Cristina Valle, KALIN       EXAM:  Constitutional  General appearance: well-developed, well-nourished, well-kempt older white female    Neurologic/Psychiatric  Alert,  normal orientation to time, place, person  Normal mood and affect with no  evidence of depression, anxiety, agitation  Skin: see photo(s)  Head: background moderate solar damage to exposed areas of skin  site(s) confirmed by reference to the photograph(s) attached below taken at the time of the biopsy/biopsies by the referring physician   inspection/palpation reveals no evidence of residual/recurrent  at this time  Minimal erythema as noted in photo    Photo(s) today      See prior photos in previous note(s) below    ASSESSMENT:   Status post biopsy of basal cell carcinoma on the nose tip by Dr. Hagan, now clinically 18 months post biopsy  Minimal residual dermatitis from efudex use to area    PLAN:  The diagnosis and management options, and risks and benefits of the alternatives, including observation/non-treatment, radiation treatment, excision with vertical frozen section or paraffin-embedded section margin evaluation, and Mohs' Micrographic Surgery, Fresh Tissue Technique, were discussed again at length with the patient. In particular, the discussion included, but was not limited to, the following:    One alternative at this point would be to defer further treatment and observe the lesion(s). With small skin cancers of this kind, it is possible that a biopsy can be sufficient to definitively treat a small skin cancer of this kind. Alternatively, some skin cancers are slow growing and do not require immediate treatment. The potential advantage of this choice would be to avoid the need for possibly unnecessary additional surgery. Among the potential disadvantages of this would be the possibility of enlargement of the lesion, more extensive spread of the lesion or recurrence at a later date, which might necessitate a larger and more complex surgery.    Radiation treatment can be an effective treatment for this type of skin cancer. The usual course of treatment is every weekday for several weeks. Local irritation will result from treatment, although no systemic side effects are expected.  The potential advantage of radiation treatment is that it avoids the need for surgery. Among the disadvantages of radiation treatment are the length of treatment, the local inflammatory response, the absence of pathologic confirmation of the removal of the skin cancer, a possible increased risk of additional skin cancer in the treated area in later years, and a somewhat increased risk of recurrence at a later date.     Excisional surgery can be an effective treatment for this type of skin cancer. This would involve excision of the lesion with margin evaluation by submitting the specimen to a pathologist for either immediate marginal assessment via frozen section processing, or delayed marginal assessment by fixed-tissue processing. The potential advantage of this technique is that it offers a way of treating the lesion with some degree of histologic confirmation of tumor removal. Among the disadvantages of this treatment are the possible need for re-excision if marginal involvement is identified, a somewhat greater likelihood of recurrence as compared to Mohs' surgery because of the less comprehensive margin evaluation inherent in the technique, and the general potential risks of surgery, including allergic reactions to the anesthetic and other materials used, infection, injury to nerves in the area with consequent loss of sensation or muscle function, and scarring or distortion of surrounding structures.    Mohs' surgery is a very effective treatment for this type of skin cancer. The potential advantage of Mohs' surgery is that this technique offers the greatest possible certainty of knowing that the skin cancer has been completely removed, with the removal of the least amount of normal tissue. The potential disadvantages of Mohs' surgery include the duration of the surgery, the possible need for a separate surgery for reconstruction following tumor removal, and scarring as a result. In addition, general potential  risks of surgery as noted above also apply to treatment via Mohs' surgery.    Sufficient time was available for questions, and all questions were answered to her satisfaction.     After discussing the clinical findings and the treatment alternatives, and the risks and benefits of the alternatives at length and in detail, and given the absence of any evidence of residual tumor to the biopsy site(s) at present, she and I have again decided to defer surgical treatment and to observe the site(s) for the present.    I have asked her to schedule a followup appointment with general dermatology in 4-6 months for ongoing observation of the site and surveillance for any new lesions; she is to followup with me in the event that any changes arise to the area in the meantime.    --------------------------------------  Note: Some or all of this note may have been generated using voice recognition software. There may be voice recognition errors including grammatical and/or spelling errors found in the text. Attempts were made to correct these errors prior to signature.     ============================================================  PREVIOUS NOTE(S):  Note of 3/24   CHIEF COMPLAINT: followup treatment with topical fluoruracil    HISTORY OF PRESENT ILLNESS:  Location(s): nose  Timing: I last saw her 2 weeks ago  Duration: has been applying the medication for 2 weeks  Quality: minimal irritation  Context: applying medication BID  See prior notes below    EXAMINATION:  Skin: there are some irregular confetti-like areas of mild erythema; see photo below    Photo(s) this visit:        ASSESSMENT:   Typical dermatitis due to application of fluoruracil    PLAN:  Current status and management options, and risks, benefits, and alternatives were discussed.  continue application of fluorouracil  Followup 3 weeks  Call prn sooner  --------------------------------------  Note: Some or all of this note may have been generated using voice  "recognition software. There may be voice recognition errors including grammatical and/or spelling errors found in the text. Attempts were made to correct these errors prior to signature.     ============================================================  PREVIOUS NOTE(S):  Note of 3/10      ALLERGIES:  Rosuvastatin    CHIEF COMPLAINT: followup basal cell carcinoma    HISTORY OF PRESENT ILLNESS:  Location: nose tip  Timing: I last saw her 7 month(s) ago  Quality: unchanged  Context: biopsy/biopsies performed Sept 2020 by Dr. Hagan  pathology showed basal cell carcinoma as noted below  at her previous visits, there has been no clearcut evidence of residual tumor, and after discussing options, we had decided to defer treatment and observe  she returns today for followup/re-evaluation    From last note:  Apparently used efudex recently to the area  saw Dr. Lawrence 6/14   Excerpt from his note:   "Saumya is here for evaluation of BCC of left nasal supratrip.   The lesion has been present for year(s). no pain, norapid growth in a short time.   She saw Dr. Elaine last November and was scheduled to see me, but decided to have her cataract surgery prior. She is here today for evaluation. She feels the lesion that was biopsied 6 months ago is not present anymore, but she does have a lesion more central to the area in question today.  There is positive history of previous skin cancer, nasal dorsum and forehead. She reports having a bad experience   She does have history of bilobed flap of nasal dorsum and forehead and she was not pleased with forehead results."    PATH:   1.  Skin, left nasal supratip, shave biopsy:   - BASAL CELL CARCINOMA.   - THE TUMOR EXTENDS TO THE DEEP AND LATERAL BIOPSY MARGINS.   MICROSCOPIC DESCRIPTION: Sections show a basaloid tumor within the dermis   exhibiting peripheral palisading, retraction artifact and apoptosis.    PERTINENT MEDICATIONS:  See medications list.      Current Outpatient " Medications:     blood sugar diagnostic Strp, TEST BLOOD SUGAR ONCE DAILY, Disp: 300 each, Rfl: 3    clopidogreL (PLAVIX) 75 mg tablet, Take 75 mg by mouth once daily. , Disp: , Rfl:     coenzyme Q10 100 mg capsule, Take 100 mg by mouth once daily., Disp: , Rfl:     empagliflozin (JARDIANCE) 25 mg tablet, Take 1 tablet (25 mg total) by mouth once daily., Disp: 90 tablet, Rfl: 1    fluticasone propionate (FLONASE) 50 mcg/actuation nasal spray, 2 sprays (100 mcg total) by Each Nostril route once daily., Disp: 16 g, Rfl: 6    furosemide (LASIX) 20 MG tablet, Take 1 tablet (20 mg total) by mouth once daily., Disp: 90 tablet, Rfl: 1    Lactobacillus rhamnosus GG (CULTURELLE) 10 billion cell capsule, Take 1 capsule by mouth once daily., Disp: , Rfl:     metoprolol succinate (TOPROL-XL) 100 MG 24 hr tablet, Take 1 tablet (100 mg total) by mouth daily, Disp: , Rfl:     multivitamin capsule, Take 1 capsule by mouth once daily., Disp:  , Rfl:     omega-3 acid ethyl esters (LOVAZA) 1 gram capsule, Take 1 capsule by mouth once daily., Disp: , Rfl:     pravastatin (PRAVACHOL) 20 MG tablet, Take 1 tablet (20 mg total) by mouth once daily., Disp: 90 tablet, Rfl: 4    vitamins  A,C,E-zinc-copper (PRESERVISION AREDS) 14,320-226-200 unit-mg-unit Cap, Take 1 capsule by mouth once daily., Disp: , Rfl:     fluorouraciL (EFUDEX) 5 % cream, AAA bid x 2 weeks, Disp: 40 g, Rfl: 0    Current Facility-Administered Medications:     acetaminophen tablet 650 mg, 650 mg, Oral, Once PRN, Cristina Valle, NP    albuterol inhaler 2 puff, 2 puff, Inhalation, Q20 Min PRN, Cristina Valle, NP    diphenhydrAMINE injection 25 mg, 25 mg, Intravenous, Once PRN, Cristina Valle, NP    EPINEPHrine (EPIPEN) 0.3 mg/0.3 mL pen injection 0.3 mg, 0.3 mg, Intramuscular, PRN, Cristina Valle, NP    methylPREDNISolone sodium succinate injection 40 mg, 40 mg, Intravenous, Once PRN, Cristina Valle, NP    ondansetron injection 4 mg, 4 mg,  Intravenous, Once PRN, Cristina Valle, NP    sodium chloride 0.9% 500 mL flush bag, , Intravenous, PRN, Cristina Valle NP, Stopped at 12/31/21 1213    sodium chloride 0.9% flush 10 mL, 10 mL, Intravenous, PRN, Cristina Valle NP     EXAM:  Constitutional  General appearance: well-developed, well-nourished, well-kempt older white female    Neurologic/Psychiatric  Alert,  normal orientation to time, place, person  Normal mood and affect with no evidence of depression, anxiety, agitation  Skin: see photo(s)  Head: background moderate solar damage to exposed areas of skin  site(s) confirmed by reference to the photograph(s) attached below taken at the time of the biopsy/biopsies by the referring physician   inspection/palpation reveals some residual erythema to the lower nose but no definitive evidence of residual/recurrent basal cell carcinoma at this time    Photo(s) today      See prior photos in previous note(s) below    ASSESSMENT:   Status post biopsy of basal cell carcinoma on the nose by Dr. Hagan  chronic solar damage to areas as noted above  personal history of non-melanoma skin cancer    PLAN:  The current clinical findings and options, including re-biopsy or topical treatment with Efudex, were discussed at length with the patient.    After discussion of the above, she has elected to proceed with topical treatment with Efudex.    Sufficient time was available for questions, and all questions were answered to her satisfaction. She fully understands the aims, risks, alternatives, and possible complications, and has elected to proceed with in this manner.    I reviewed the use of the medication and the anticipated effects.    She is to followup in two week(s) for assessment of the treatment, and to call PRN sooner with any questions or problems.  ---------------------  Note: Some or all of this note may have been generated using voice recognition software. There may be voice recognition errors  "including grammatical and/or spelling errors found in the text. Attempts were made to correct these errors prior to signature.     ============================================================  PREVIOUS NOTE(S):  Note of 8/5   ALLERGIES:  Patient has no known allergies.    CHIEF COMPLAINT: followup basal cell carcinoma on the nose    The patient is accompanied to this visit by her .    HISTORY OF PRESENT ILLNESS:  Location: nose  Timing: I last saw her in November 2020    Context: prior biopsy in 9/2020 showed basal cell carcinoma; see below  Apparently used efudex recently to the area  saw Dr. Lawrence 6/14   Excerpt from his note:   "Saumya is here for evaluation of BCC of left nasal supratrip.   The lesion has been present for year(s). no pain, norapid growth in a short time.   She saw Dr. Elaine last November and was scheduled to see me, but decided to have her cataract surgery prior. She is here today for evaluation. She feels the lesion that was biopsied 6 months ago is not present anymore, but she does have a lesion more central to the area in question today.  There is positive history of previous skin cancer, nasal dorsum and forehead. She reports having a bad experience   She does have history of bilobed flap of nasal dorsum and forehead and she was not pleased with forehead results."    Quality: has some crusting after recent Efudex use to the area    PATH:   1.  Skin, left nasal supratip, shave biopsy:   - BASAL CELL CARCINOMA.   - THE TUMOR EXTENDS TO THE DEEP AND LATERAL BIOPSY MARGINS.   MICROSCOPIC DESCRIPTION: Sections show a basaloid tumor within the dermis   exhibiting peripheral palisading, retraction artifact and apoptosis.    REVIEW OF SYSTEMS:   Reviewed ROS from 11/2020; see below    PAST MEDICAL HISTORY:  Past Medical History:   Diagnosis Date    Cataract     OU    Chronic heart failure with preserved ejection fraction 3/29/2021    Chronic systolic heart failure     Coronary " artery disease involving native coronary artery of native heart without angina pectoris 4/7/2020    Hypertension associated with diabetes 12/8/2015    Ischemic cardiomyopathy     Macrocytosis without anemia 3/26/2021    Mixed hyperlipidemia     Multiple pulmonary nodules 3/26/2020    NSTEMI (non-ST elevated myocardial infarction) 3/26/2020    Post-menopause on HRT (hormone replacement therapy) 9/17/2020    Squamous cell carcinoma of skin     right scalp and mid nasal bridge       PAST SURGICAL HISTORY:  Past Surgical History:   Procedure Laterality Date    CARDIAC CATHETERIZATION  04/02/2020    [place]:  Dr Viera    CATARACT EXTRACTION, BILATERAL      CORONARY ARTERY BYPASS GRAFT  06/10/2020    HYSTERECTOMY      age 38 TAHBSO on patch for 27 years    SKIN BIOPSY         SOCIAL HISTORY:  Social History     Tobacco Use    Smoking status: Never Smoker    Smokeless tobacco: Never Used   Substance Use Topics    Alcohol use: No    Drug use: Never        PERTINENT MEDICATIONS:  See medications list.      Current Outpatient Medications:     blood sugar diagnostic Strp, TEST BLOOD SUGAR ONCE DAILY, Disp: 300 each, Rfl: 3    clopidogreL (PLAVIX) 75 mg tablet, Take 75 mg by mouth once daily. , Disp: , Rfl:     coenzyme Q10 (CO Q-10) 100 mg capsule, Take 100 mg by mouth once daily., Disp: , Rfl:     fluticasone (FLONASE) 50 mcg/actuation nasal spray, 2 sprays (100 mcg total) by Each Nare route once daily., Disp: 16 g, Rfl: 6    furosemide (LASIX) 20 MG tablet, Take 1 tablet (20 mg total) by mouth once daily., Disp: 90 tablet, Rfl: 1    JARDIANCE 25 mg tablet, Take 1 tablet (25 mg total) by mouth once daily., Disp: 90 tablet, Rfl: 1    Lactobacillus rhamnosus GG (CULTURELLE) 10 billion cell capsule, Take 1 capsule by mouth once daily., Disp: , Rfl:     metoprolol succinate (TOPROL-XL) 100 MG 24 hr tablet, Take 1 tablet (100 mg total) by mouth daily, Disp: , Rfl:     multivitamin capsule, Take 1  capsule by mouth once daily., Disp:  , Rfl:     omega-3 acid ethyl esters (LOVAZA) 1 gram capsule, Take 1 capsule by mouth once daily., Disp: , Rfl:     rosuvastatin (CRESTOR) 20 MG tablet, Take 1 tablet (20 mg total) by mouth once daily., Disp: 90 tablet, Rfl: 1    vitamins  A,C,E-zinc-copper (PRESERVISION AREDS) 14,320-226-200 unit-mg-unit Cap, Take 1 capsule by mouth once daily., Disp: , Rfl:        EXAM:  Constitutional  General appearance: well-developed, well-nourished, well-kempt older white female    Neurologic/Psychiatric  Alert,  normal orientation to time, place, person  Normal mood and affect with no evidence of depression, anxiety, agitation  Skin: see photo(s)  Head: background moderate solar damage to exposed areas of skin  There is an approx 8 mm area of erosion/crusting to the mid lower nose  This appears to be medial to the site of the previous biopsy  See photos below    Photo(s) this visit:       Photo(s) from biopsy visit - see below    ASSESSMENT:   Status post biopsy, basal cell carcinoma, left lower nose; pending treatment  There is no clear cut evidence of residual basal cell carcinoma to the left lower nose site at present  Efudex-induced dermatitis medicamentosa, mid lower nose    PLAN:  The diagnosis and management options, and risks and benefits of the alternatives were discussed with the patient and her .  Under the circumstances, it is difficult to assess the current changes.  I will have her discontinue use of Efudex, and follow-up in 3 months for reassessment.  --------------------------------------  Note: Some or all of this note may have been generated using voice recognition software. There may be voice recognition errors including grammatical and/or spelling errors found in the text. Attempts were made to correct these errors prior to signature.   ============================================================  PREVIOUS NOTE(S):  Note of 11/05/2020     ALLERGIES:  Patient  has no known allergies.  CHIEF COMPLAINT:  This 75 y.o. female comes for evaluation for Mohs' Micrographic Surgery, Fresh Tissue Technique, for treatment of a biopsy-proven basal cell carcinoma on the left nasal supratip. Consultation requested by Chhaya Hagan M.D..    The patient is accompanied to this visit by her .    HISTORY OF PRESENT ILLNESS:   Location: let nasal supratip  Duration: unknown  Quality: better at present  Context: status post biopsy by Chhaya Hagan M.D.; path = basal cell carcinoma; pathology accession #[id number], Pathology [facility]     Prior Treatment: none  See also the handwritten notes/diagrams scanned to chart for additional details.    Defibrillator: No  Pacemaker: No  Artificial heart valves: No  Artificial joints: No    REVIEW OF SYSTEMS:   General: general health good  Skin: previous skin cancer(s) Yes   If yes, details: SCC scalp and nose, had mohs surgery by Dr. Biggs  Relevant other:  No   Cardiovascular:   High Blood Pressure: Yes   Chest Pain:  No   Defibrillator: as above  Pacemaker: as above  Artificial heart valves: as above  Prior Endocarditis: No   Prior Heart Attack/MI: No     If yes, when:   Prior Cardiac Bypass or Stents:  Yes   If yes, when: Quad by pass 6-  Mitral Valve Prolapse: No   Relevant other:  No   Respiratory:   Shortness of breath:  No   Relevant other:  No   Endocrine:   Diabetes:  No   Relevant other:  No   Hem/Lymph:   Taking Prescribed Blood Thinners:  Yes  Plavix  Easy Bleeding:  Yes   Relevant other:  No   Allergy/Immuno: as noted above  Relevant other:  No   GI:   Prior Hepatitis:  No     If yes, details:   Relevant other:  No   Musculoskeletal:   Artificial joints: as above  Relevant other:  No   Neurologic:   Prior Stroke:  No     If yes, details:   Relevant other:  No   Relevant other info:  Yes  Ischemic cardiomyopathy     PAST MEDICAL HISTORY:  Past Medical History:   Diagnosis Date    Cataract     OU    Coronary artery disease  involving native coronary artery of native heart without angina pectoris 4/7/2020    Hypertension associated with diabetes 12/8/2015    Ischemic cardiomyopathy     Mixed hyperlipidemia     Multiple pulmonary nodules 3/26/2020    NSTEMI (non-ST elevated myocardial infarction) 3/26/2020    Squamous cell carcinoma of skin     right scalp and mid nasal bridge       PAST SURGICAL HISTORY:  Past Surgical History:   Procedure Laterality Date    CARDIAC CATHETERIZATION  04/02/2020    [place]:  Dr Viera    CORONARY ARTERY BYPASS GRAFT  06/10/2020    HYSTERECTOMY      age 38 TAHBSO on patch for 27 years    SKIN BIOPSY          SOCIAL HISTORY:  Dependencies: smoking status as noted below  Social History     Tobacco Use    Smoking status: Never Smoker    Smokeless tobacco: Never Used   Substance Use Topics    Alcohol use: No    Drug use: Never       PERTINENT MEDICATIONS:  See medications list.    Current Outpatient Medications:     blood sugar diagnostic Strp, TEST BLOOD SUGAR ONCE DAILY, Disp: 300 each, Rfl: 3    clopidogreL (PLAVIX) 75 mg tablet, Take 75 mg by mouth once daily. , Disp: , Rfl:     co-enzyme Q-10 50 mg capsule, Take 2 capsules (100 mg total) by mouth once daily., Disp:  , Rfl:     fluticasone (FLONASE) 50 mcg/actuation nasal spray, 2 sprays (100 mcg total) by Each Nare route once daily., Disp: 16 g, Rfl: 6    furosemide (LASIX) 20 MG tablet, Take 1 tablet (20 mg total) by mouth once daily., Disp: 90 tablet, Rfl: 3    JARDIANCE 10 mg tablet, TAKE 1 TABLET BY MOUTH 1 TIME A DAY, Disp: 30 tablet, Rfl: 3    metoprolol succinate (TOPROL-XL) 100 MG 24 hr tablet, Take 1 tablet (100 mg total) by mouth daily, Disp: , Rfl:     multivitamin capsule, Take 1 capsule by mouth once daily., Disp:  , Rfl:     omega-3 fatty acids/fish oil (FISH OIL-OMEGA-3 FATTY ACIDS) 300-1,000 mg capsule, Take 1 capsule by mouth once daily., Disp:  , Rfl:     rosuvastatin (CRESTOR) 20 MG tablet, Take 1 tablet (20 mg  total) by mouth once daily., Disp: 90 tablet, Rfl: 0    triamcinolone acetonide 0.1% (KENALOG) 0.1 % cream, Apply topically 2 (two) times daily. Apply for 2 weeks, then take a break for 2 weeks. Repeat as needed., Disp: 45 g, Rfl: 1    aspirin 81 MG Chew, Take 81 mg by mouth once daily., Disp: , Rfl:     estradiol (VIVELLE-DOT) 0.1 mg/24 hr PTSW, APPLY 1 PATCH ONTO SKIN TWO TIMES A WEEK (Patient not taking: Reported on 11/5/2020), Disp: 8 patch, Rfl: 11    Lactobacillus rhamnosus GG (CULTURELLE) 10 billion cell capsule, Take 1 capsule by mouth once daily., Disp: , Rfl:     paroxetine (PAXIL) 10 MG tablet, Take 1 tablet (10 mg total) by mouth once daily. (Patient not taking: Reported on 11/5/2020), Disp: 30 tablet, Rfl: 2    ALLERGIES:  Patient has no known allergies.    EXAM:  See also the handwritten notes/diagrams scanned to chart for additional details.  Constitutional  General appearance: well-developed, well-nourished, well-kempt older white female    Eyes  Inspection of conjunctivae and lids reveals no abnormalities; sclerae anicteric  Neurologic/Psychiatric  Alert,  normal orientation to time, place, person  Normal mood and affect with no evidence of depression, anxiety, agitation  Skin: see photo(s)  Head: background moderate solar damage to exposed areas of skin; in addition, inspection/palpation reveals an ill-defined, approximately 6-8 mm pink biopsy site on the lower nose; site(s) confirmed by reference to the photograph(s) attached below taken at the time of the biopsy/biopsies by the referring physician and she confirmed this as the site of the prior biopsy  Neck: examination reveals moderate chronic solar damage  Right upper extremity: examination reveals moderate chronic solar damage  Left upper extremity: examination reveals moderate chronic solar damage    Photo(s) this visit:       Photo(s) from biopsy visit:      ASSESSMENT: biopsy-proven basal cell carcinoma of the lower nose  chronic solar  damage to areas as noted above  personal history of non-melanoma skin cancer    PLAN:  The diagnosis and management options, and risks and benefits of the alternatives, including observation/non-treatment, radiation treatment, excision with vertical frozen section or paraffin-embedded section margin evaluation, and Mohs' Micrographic Surgery, Fresh Tissue Technique, were discussed at length with the patient and her . In particular, the discussion included, but was not limited to, the following:    One alternative at this point would be to defer further treatment and observe the lesion. With small skin cancers of this kind, it is possible that a biopsy can be sufficient to definitively treat a small skin cancer of this kind. Alternatively, some skin cancers are slow growing and do not require immediate treatment. The potential advantage of this choice would be to avoid the need for possibly unnecessary additional surgery. Among the potential disadvantages of this would be the possibility of enlargement of the lesion, more extensive spread of the lesion or recurrence at a later date, which might necessitate a larger and more complex surgery.    Radiation treatment can be an effective treatment for this type of skin cancer. The usual course of treatment is every weekday for several weeks. Local irritation will result from treatment, although no systemic side effects are expected. The potential advantage of radiation treatment is that it avoids the need for surgery. Among the disadvantages of radiation treatment are the length of treatment, the local inflammatory response, the absence of pathologic confirmation of the removal of the skin cancer, a possible increased risk of additional skin cancer in the treated area in later years, and a somewhat increased risk of recurrence at a later date.     Excisional surgery can be an effective treatment for this type of skin cancer. This would involve excision of the  lesion with margin evaluation by submitting the specimen to a pathologist for either immediate marginal assessment via frozen section processing, or delayed marginal assessment by fixed-tissue processing. The potential advantage of this technique is that it offers a way of treating the lesion with some degree of histologic confirmation of tumor removal. Among the disadvantages of this treatment are the possible need for re-excision if marginal involvement is identified, a somewhat greater likelihood of recurrence as compared to Mohs' surgery because of the less comprehensive margin evaluation inherent in the technique, and the general potential risks of surgery, including allergic reactions to the anesthetic and other materials used, infection, injury to nerves in the area with consequent loss of sensation or muscle function, and scarring or distortion of surrounding structures.    Mohs' surgery is a very effective treatment for this type of skin cancer. The potential advantage of Mohs' surgery is that this technique offers the greatest possible certainty of knowing that the skin cancer has been completely removed, with the removal of the least amount of normal tissue. The potential disadvantages of Mohs' surgery include the duration of the surgery, the possible need for a separate surgery for reconstruction following tumor removal, and scarring as a result. In addition, general potential risks of surgery as noted above also apply to treatment via Mohs' surgery.    In light of the nature of this tumor and the location  On the nose in an area of increased risk of recurrence,  Mohs' micrographic surgery was thought to be the most appropriate management choice, and this diagnosis is appropriate for treatment by Mohs' micrographic surgery.     We also discussed options for repair of the site to follow tumor removal via Mohs' surgery, including the participation of a reconstructive surgeon to reconstruct the resultant  wound. Given the location, the anticipated size and potential complexity of the defect to follow tumor removal via Mohs' surgery, reconstruction will be coordinated with Arjun Lawrence MD.  I will contact Dr. Lawrence to arrange for the patient to be seen in consultation, and we will coordinate the surgeries thereafter.    Sufficient time was available for questions, and all questions were answered to her satisfaction. She fully understands the aims, risks, alternatives, and possible complications, and has elected to proceed with the surgery, and verbally consented to do so. The procedure will be scheduled in the near future.    Routine pre-op instructions were given to her.    --------------------------------------  Note: Some or all of this note may have been generated using voice recognition software. There may be voice recognition errors including grammatical and/or spelling errors found in the text. Attempts were made to correct these errors prior to signature.

## 2022-05-03 DIAGNOSIS — Z79.899 LONG TERM CURRENT USE OF DIURETIC: Chronic | ICD-10-CM

## 2022-05-03 DIAGNOSIS — R91.8 MULTIPLE PULMONARY NODULES: Chronic | ICD-10-CM

## 2022-05-03 DIAGNOSIS — E11.69 HYPERLIPIDEMIA ASSOCIATED WITH TYPE 2 DIABETES MELLITUS: Chronic | ICD-10-CM

## 2022-05-03 DIAGNOSIS — Z79.02 ANTIPLATELET OR ANTITHROMBOTIC LONG-TERM USE: Chronic | ICD-10-CM

## 2022-05-03 DIAGNOSIS — I50.22 CHRONIC SYSTOLIC HEART FAILURE: Chronic | ICD-10-CM

## 2022-05-03 DIAGNOSIS — Z95.1 STATUS POST AORTO-CORONARY ARTERY BYPASS GRAFT: Chronic | ICD-10-CM

## 2022-05-03 DIAGNOSIS — I15.2 HYPERTENSION ASSOCIATED WITH DIABETES: ICD-10-CM

## 2022-05-03 DIAGNOSIS — I25.10 CORONARY ARTERY DISEASE INVOLVING NATIVE CORONARY ARTERY OF NATIVE HEART WITHOUT ANGINA PECTORIS: Chronic | ICD-10-CM

## 2022-05-03 DIAGNOSIS — I25.5 ISCHEMIC CARDIOMYOPATHY: Chronic | ICD-10-CM

## 2022-05-03 DIAGNOSIS — R79.89 LOW VITAMIN B12 LEVEL: ICD-10-CM

## 2022-05-03 DIAGNOSIS — E11.9 TYPE 2 DIABETES MELLITUS WITHOUT COMPLICATION, WITHOUT LONG-TERM CURRENT USE OF INSULIN: Chronic | ICD-10-CM

## 2022-05-03 DIAGNOSIS — Z13.0 SCREENING FOR DEFICIENCY ANEMIA: ICD-10-CM

## 2022-05-03 DIAGNOSIS — Z13.89 SCREENING FOR HEMATURIA OR PROTEINURIA: ICD-10-CM

## 2022-05-03 DIAGNOSIS — Z13.21 ENCOUNTER FOR VITAMIN DEFICIENCY SCREENING: ICD-10-CM

## 2022-05-03 DIAGNOSIS — Z13.29 SCREENING FOR THYROID DISORDER: ICD-10-CM

## 2022-05-03 DIAGNOSIS — E78.5 HYPERLIPIDEMIA ASSOCIATED WITH TYPE 2 DIABETES MELLITUS: Chronic | ICD-10-CM

## 2022-05-03 DIAGNOSIS — Z79.82 ASPIRIN LONG-TERM USE: Chronic | ICD-10-CM

## 2022-05-03 DIAGNOSIS — E11.59 HYPERTENSION ASSOCIATED WITH DIABETES: ICD-10-CM

## 2022-05-03 NOTE — TELEPHONE ENCOUNTER
Care Due:                  Date            Visit Type   Department     Provider  --------------------------------------------------------------------------------                                EP -                              PRIMARY      [facility] FAM[facility]  Last Visit: 12-      CARE ([facility])   ROJELIO Jean                               -                              PRIMARY      [facility] FAM[facility]  Next Visit: 06-      CARE ([facility])   [facility]       Pablito Jean                                                            Last  Test          Frequency    Reason                     Performed    Due Date  --------------------------------------------------------------------------------    CMP.........  12 months..  pravastatin..............  03- 03-    HBA1C.......  6 months...  empagliflozin............  12- 06-    Powered by [employer]. Reference number: [id number].   5/03/2022 9:55:28 AM CDT

## 2022-05-04 RX ORDER — FUROSEMIDE 20 MG/1
20 TABLET ORAL DAILY
Qty: 30 TABLET | Refills: 0 | Status: SHIPPED | OUTPATIENT
Start: 2022-05-04 | End: 2022-08-30 | Stop reason: SDUPTHER

## 2022-05-04 NOTE — TELEPHONE ENCOUNTER
Refill Routing Note   Medication(s) are not appropriate for processing by [facility] for the following reason(s):      - Required vitals are abnormal    [facility] action(s):  Defer Medication-related problems identified: Requires labs        Medication reconciliation completed: No     Appointments  past 12m or future 3m with PCP    Date Provider   Last Visit   12/6/2021 Pablito Jean MD   Next Visit   6/8/2022 Pablito Jean MD   ED visits in past 90 days: 0        Note composed:7:54 PM 05/03/2022

## 2022-05-31 ENCOUNTER — PATIENT MESSAGE (OUTPATIENT)
Dept: FAMILY MEDICINE | Facility: CLINIC | Age: 77
End: 2022-05-31
Payer: MEDICARE

## 2022-06-28 DIAGNOSIS — E11.65 TYPE 2 DIABETES MELLITUS WITH HYPERGLYCEMIA, WITHOUT LONG-TERM CURRENT USE OF INSULIN: Chronic | ICD-10-CM

## 2022-06-28 RX ORDER — EMPAGLIFLOZIN 25 MG/1
25 TABLET, FILM COATED ORAL DAILY
Qty: 30 TABLET | Refills: 0 | Status: SHIPPED | OUTPATIENT
Start: 2022-06-28 | End: 2022-08-24 | Stop reason: SDUPTHER

## 2022-06-28 NOTE — TELEPHONE ENCOUNTER
Refill Routing Note   Medication(s) are not appropriate for processing by [facility] for the following reason(s):      - Required laboratory values are outdated    [facility] action(s):  Defer Medication-related problems identified: Requires labs     Medication Therapy Plan: Labs (a1c)  Medication reconciliation completed: No     Appointments  past 12m or future 3m with PCP    Date Provider   Last Visit   12/6/2021 Pablito Jean MD   Next Visit   Visit date not found Pablito Jean MD   ED visits in past 90 days: 0        Note composed:4:01 PM 06/28/2022

## 2022-06-28 NOTE — TELEPHONE ENCOUNTER
No new care gaps identified.  [facility] Embedded Care Gaps. Reference number: [id number]. 6/28/2022   11:06:00 AM CDT

## 2022-07-05 ENCOUNTER — LAB VISIT (OUTPATIENT)
Dept: LAB | Facility: HOSPITAL | Age: 77
End: 2022-07-05
Attending: FAMILY MEDICINE
Payer: MEDICARE

## 2022-07-05 DIAGNOSIS — E11.69 HYPERLIPIDEMIA ASSOCIATED WITH TYPE 2 DIABETES MELLITUS: ICD-10-CM

## 2022-07-05 DIAGNOSIS — E11.59 HYPERTENSION ASSOCIATED WITH DIABETES: ICD-10-CM

## 2022-07-05 DIAGNOSIS — E78.5 HYPERLIPIDEMIA ASSOCIATED WITH TYPE 2 DIABETES MELLITUS: ICD-10-CM

## 2022-07-05 DIAGNOSIS — I15.2 HYPERTENSION ASSOCIATED WITH DIABETES: ICD-10-CM

## 2022-07-05 DIAGNOSIS — Z79.899 ENCOUNTER FOR LONG-TERM (CURRENT) USE OF MEDICATIONS: ICD-10-CM

## 2022-07-05 DIAGNOSIS — E11.65 TYPE 2 DIABETES MELLITUS WITH HYPERGLYCEMIA, WITHOUT LONG-TERM CURRENT USE OF INSULIN: ICD-10-CM

## 2022-07-05 LAB
ESTIMATED AVG GLUCOSE: 157 MG/DL (ref 68–131)
HBA1C MFR BLD: 7.1 % (ref 4–5.6)

## 2022-07-05 PROCEDURE — 83036 HEMOGLOBIN GLYCOSYLATED A1C: CPT | Performed by: FAMILY MEDICINE

## 2022-07-05 PROCEDURE — 36415 COLL VENOUS BLD VENIPUNCTURE: CPT | Mod: PO | Performed by: FAMILY MEDICINE

## 2022-07-07 DIAGNOSIS — Z79.899 LONG TERM CURRENT USE OF DIURETIC: Chronic | ICD-10-CM

## 2022-07-07 DIAGNOSIS — E78.5 HYPERLIPIDEMIA ASSOCIATED WITH TYPE 2 DIABETES MELLITUS: Chronic | ICD-10-CM

## 2022-07-07 DIAGNOSIS — I50.22 CHRONIC SYSTOLIC HEART FAILURE: Chronic | ICD-10-CM

## 2022-07-07 DIAGNOSIS — E11.59 HYPERTENSION ASSOCIATED WITH DIABETES: ICD-10-CM

## 2022-07-07 DIAGNOSIS — I25.10 CORONARY ARTERY DISEASE INVOLVING NATIVE CORONARY ARTERY OF NATIVE HEART WITHOUT ANGINA PECTORIS: Chronic | ICD-10-CM

## 2022-07-07 DIAGNOSIS — Z13.21 ENCOUNTER FOR VITAMIN DEFICIENCY SCREENING: ICD-10-CM

## 2022-07-07 DIAGNOSIS — I25.5 ISCHEMIC CARDIOMYOPATHY: Chronic | ICD-10-CM

## 2022-07-07 DIAGNOSIS — R79.89 LOW VITAMIN B12 LEVEL: ICD-10-CM

## 2022-07-07 DIAGNOSIS — E11.9 TYPE 2 DIABETES MELLITUS WITHOUT COMPLICATION, WITHOUT LONG-TERM CURRENT USE OF INSULIN: Chronic | ICD-10-CM

## 2022-07-07 DIAGNOSIS — I15.2 HYPERTENSION ASSOCIATED WITH DIABETES: ICD-10-CM

## 2022-07-07 DIAGNOSIS — E11.69 HYPERLIPIDEMIA ASSOCIATED WITH TYPE 2 DIABETES MELLITUS: Chronic | ICD-10-CM

## 2022-07-07 DIAGNOSIS — Z13.89 SCREENING FOR HEMATURIA OR PROTEINURIA: ICD-10-CM

## 2022-07-07 DIAGNOSIS — Z79.82 ASPIRIN LONG-TERM USE: Chronic | ICD-10-CM

## 2022-07-07 DIAGNOSIS — Z13.29 SCREENING FOR THYROID DISORDER: ICD-10-CM

## 2022-07-07 DIAGNOSIS — R91.8 MULTIPLE PULMONARY NODULES: Chronic | ICD-10-CM

## 2022-07-07 DIAGNOSIS — Z13.0 SCREENING FOR DEFICIENCY ANEMIA: ICD-10-CM

## 2022-07-07 DIAGNOSIS — Z95.1 STATUS POST AORTO-CORONARY ARTERY BYPASS GRAFT: Chronic | ICD-10-CM

## 2022-07-07 DIAGNOSIS — Z79.02 ANTIPLATELET OR ANTITHROMBOTIC LONG-TERM USE: Chronic | ICD-10-CM

## 2022-07-07 DIAGNOSIS — E11.65 TYPE 2 DIABETES MELLITUS WITH HYPERGLYCEMIA, WITHOUT LONG-TERM CURRENT USE OF INSULIN: Chronic | ICD-10-CM

## 2022-07-07 DIAGNOSIS — E78.5 HYPERLIPIDEMIA, UNSPECIFIED HYPERLIPIDEMIA TYPE: ICD-10-CM

## 2022-07-07 RX ORDER — LANCETS 33 GAUGE
EACH MISCELLANEOUS
Qty: 100 EACH | Refills: 0 | OUTPATIENT
Start: 2022-07-07

## 2022-07-07 RX ORDER — INSULIN PUMP SYRINGE, 3 ML
EACH MISCELLANEOUS
Refills: 0 | OUTPATIENT
Start: 2022-07-07

## 2022-07-07 RX ORDER — BLOOD-GLUCOSE METER
EACH MISCELLANEOUS
Qty: 1 EACH | Refills: 0 | OUTPATIENT
Start: 2022-07-07

## 2022-07-07 RX ORDER — FUROSEMIDE 20 MG/1
TABLET ORAL
Qty: 90 TABLET | Refills: 0 | OUTPATIENT
Start: 2022-07-07

## 2022-07-07 RX ORDER — EMPAGLIFLOZIN 25 MG/1
TABLET, FILM COATED ORAL
Qty: 90 TABLET | Refills: 0 | OUTPATIENT
Start: 2022-07-07

## 2022-07-07 RX ORDER — ISOPROPYL ALCOHOL 70 ML/100ML
SWAB TOPICAL
Refills: 0 | OUTPATIENT
Start: 2022-07-07

## 2022-07-07 NOTE — TELEPHONE ENCOUNTER
No new care gaps identified.  [facility] Embedded Care Gaps. Reference number: [id number]. 7/07/2022   11:11:16 AM CDT

## 2022-07-07 NOTE — TELEPHONE ENCOUNTER
No new care gaps identified.  [facility] Embedded Care Gaps. Reference number: [id number]. 7/07/2022   11:12:53 AM ISABELT

## 2022-07-07 NOTE — TELEPHONE ENCOUNTER
[facility] Note  Quick DC. Inappropriate Request   Refill request requires further review by MD: NO   Medication Therapy Plan: Pharmacy is requesting new script(s) for the following medications without required information, (sig/ frequency/qty/etc)     [facility] action(s):  Quick Discontinue      Duplicate Pended Encounter(s)/ Last Prescribed Details:    Pharmacies have been requesting medications for patients without required information, (sig, frequency, qty, etc.). In addition, requests are sent for medication(s) pt. are currently not taking, and medications patients have never taken.    We have spoken to the pharmacies about these request types and advised their teams previously that we are unable to assess these New Script requests and require all details for these requests. This is a known issue and has been reported.        Medication related problems are not assessed for QDC.   Medication Reconciliation Completed? NO Were there pending details that required adjustment? NO     Automatic Epic Generated Protocol Data Below:   Requested Prescriptions   Pending Prescriptions Disp Refills    blood sugar diagnostic (TRUE METRIX GLUCOSE TEST STRIP) Strp [Pharmacy Med Name: [facility] TRUE METRIX TEST STRIP (50)] 100 strip 0              Appointments      Date Provider   Last Visit   12/6/2021 Pablito Jean MD   Next Visit   7/7/2022 Pablito Jean MD        Note composed:11:38 AM 07/07/2022

## 2022-07-07 NOTE — TELEPHONE ENCOUNTER
[facility] Note  Quick DC. Inappropriate Request   Refill request requires further review by MD: NO   Medication Therapy Plan: Pharmacy is requesting new script(s) for the following medications without required information, (sig/ frequency/qty/etc)     [facility] action(s):  Quick Discontinue      Duplicate Pended Encounter(s)/ Last Prescribed Details:    Pharmacies have been requesting medications for patients without required information, (sig, frequency, qty, etc.). In addition, requests are sent for medication(s) pt. are currently not taking, and medications patients have never taken.    We have spoken to the pharmacies about these request types and advised their teams previously that we are unable to assess these New Script requests and require all details for these requests. This is a known issue and has been reported.        Medication related problems are not assessed for QDC.   Medication Reconciliation Completed? NO Were there pending details that required adjustment? NO     Automatic Epic Generated Protocol Data Below:   Requested Prescriptions   Pending Prescriptions Disp Refills    blood glucose control, low (TRUE METRIX LEVEL 1) Soln [Pharmacy Med Name: TRUE METRIX CONTROL SOL LEVEL 1] 1 each 0              Appointments      Date Provider   Last Visit   12/6/2021 Pablito Jean MD   Next Visit   7/7/2022 Pablito Jean MD        Note composed:11:39 AM 07/07/2022

## 2022-07-07 NOTE — TELEPHONE ENCOUNTER
No new care gaps identified.  [facility] Embedded Care Gaps. Reference number: [id number]. 7/07/2022   11:15:06 AM ISABELT

## 2022-07-07 NOTE — TELEPHONE ENCOUNTER
[facility] Note  Quick DC. Inappropriate Request   Refill request requires further review by MD: NO   Medication Therapy Plan: Pharmacy is requesting new script(s) for the following medications without required information, (sig/ frequency/qty/etc)     [facility] action(s):  Quick Discontinue      Duplicate Pended Encounter(s)/ Last Prescribed Details:    Pharmacies have been requesting medications for patients without required information, (sig, frequency, qty, etc.). In addition, requests are sent for medication(s) pt. are currently not taking, and medications patients have never taken.    We have spoken to the pharmacies about these request types and advised their teams previously that we are unable to assess these New Script requests and require all details for these requests. This is a known issue and has been reported.        Medication related problems are not assessed for QDC.   Medication Reconciliation Completed? NO Were there pending details that required adjustment? NO     Automatic Epic Generated Protocol Data Below:   Requested Prescriptions   Pending Prescriptions Disp Refills    lancets (TRUEPLUS LANCETS) 33 gauge Misc [Pharmacy Med Name: TRUEPLUS 33G LANCETS] 100 each 0              Appointments      Date Provider   Last Visit   12/6/2021 Pablito Jean MD   Next Visit   7/7/2022 Pablito Jean MD        Note composed:11:39 AM 07/07/2022

## 2022-07-07 NOTE — TELEPHONE ENCOUNTER
[facility] Note  Quick DC. Inappropriate Request   Refill request requires further review by MD: NO   Medication Therapy Plan: Pharmacy is requesting new script(s) for the following medications without required information, (sig/ frequency/qty/etc)     [facility] action(s):  Quick Discontinue      Duplicate Pended Encounter(s)/ Last Prescribed Details:    Pharmacies have been requesting medications for patients without required information, (sig, frequency, qty, etc.). In addition, requests are sent for medication(s) pt. are currently not taking, and medications patients have never taken.    We have spoken to the pharmacies about these request types and advised their teams previously that we are unable to assess these New Script requests and require all details for these requests. This is a known issue and has been reported.        Medication related problems are not assessed for QDC.   Medication Reconciliation Completed? NO Were there pending details that required adjustment? NO     Automatic Epic Generated Protocol Data Below:   Requested Prescriptions   Pending Prescriptions Disp Refills    furosemide (LASIX) 20 MG tablet [Pharmacy Med Name: FUROSEMIDE 20MG TAB] 90 tablet 0              Appointments      Date Provider   Last Visit   12/6/2021 Pablito Jean MD   Next Visit   7/7/2022 Pablito Jean MD        Note composed:11:39 AM 07/07/2022

## 2022-07-07 NOTE — TELEPHONE ENCOUNTER
[facility] Note  Quick DC. Inappropriate Request   Refill request requires further review by MD: NO   Medication Therapy Plan: Pharmacy is requesting new script(s) for the following medications without required information, (sig/ frequency/qty/etc)     [facility] action(s):  Quick Discontinue      Duplicate Pended Encounter(s)/ Last Prescribed Details:    Pharmacies have been requesting medications for patients without required information, (sig, frequency, qty, etc.). In addition, requests are sent for medication(s) pt. are currently not taking, and medications patients have never taken.    We have spoken to the pharmacies about these request types and advised their teams previously that we are unable to assess these New Script requests and require all details for these requests. This is a known issue and has been reported.        Medication related problems are not assessed for QDC.   Medication Reconciliation Completed? NO Were there pending details that required adjustment? NO     Automatic Epic Generated Protocol Data Below:   Requested Prescriptions   Pending Prescriptions Disp Refills    empagliflozin (JARDIANCE) 25 mg tablet [Pharmacy Med Name: JARDIANCE 25MG TAB] 90 tablet 0              Appointments      Date Provider   Last Visit   12/6/2021 Pablito Jean MD   Next Visit   7/7/2022 Pablito Jean MD        Note composed:11:39 AM 07/07/2022

## 2022-07-07 NOTE — TELEPHONE ENCOUNTER
No new care gaps identified.  [facility] Embedded Care Gaps. Reference number: [id number]. 7/07/2022   11:27:58 AM TANIYA

## 2022-07-07 NOTE — PROGRESS NOTES
Make follow-up lab appointment per recommendation below.  Check to see if patient has seen the results through my chart.  If not then,  #CALL THE PATIENT# to discuss results/see if they have questions and document verification of contact. Make F/U appt if needed. [phone]    #My interpretation that was sent to them through play140:  Saumya, I have reviewed your recent blood work.     Your hemoglobin A1c is stable.  Continue current diabetes medication regimen.  Work on a low-carbohydrate diet.  Recheck A1c in six months.  This test is gold standard screening test for diabetes.  It is a measures 3 months of your average blood sugar.  =========================  Also please address any outstanding health maintenance that may be due: COVID-19 Vaccine(4 - Booster for Pfizer series) due on 02/05/2022  DEXA Scan due on 02/13/2022  Foot Exam due on 03/29/2022

## 2022-07-07 NOTE — TELEPHONE ENCOUNTER
No new care gaps identified.  [facility] Embedded Care Gaps. Reference number: [id number]. 7/07/2022   11:19:24 AM CDT

## 2022-07-07 NOTE — TELEPHONE ENCOUNTER
[facility] Note  Quick DC. Inappropriate Request   Refill request requires further review by MD: NO   Medication Therapy Plan: Pharmacy is requesting new script(s) for the following medications without required information, (sig/ frequency/qty/etc)     [facility] action(s):  Quick Discontinue      Duplicate Pended Encounter(s)/ Last Prescribed Details:    Pharmacies have been requesting medications for patients without required information, (sig, frequency, qty, etc.). In addition, requests are sent for medication(s) pt. are currently not taking, and medications patients have never taken.    We have spoken to the pharmacies about these request types and advised their teams previously that we are unable to assess these New Script requests and require all details for these requests. This is a known issue and has been reported.        Medication related problems are not assessed for QDC.   Medication Reconciliation Completed? NO Were there pending details that required adjustment? NO     Automatic Epic Generated Protocol Data Below:   Requested Prescriptions   Pending Prescriptions Disp Refills    blood-glucose meter (TRUE METRIX GLUCOSE METER) kit [Pharmacy Med Name: TRUE METRIX METER KIT]  0              Appointments      Date Provider   Last Visit   12/6/2021 Pablito Jean MD   Next Visit   7/22/2022 Pablito Jean MD        Note composed:11:40 AM 07/07/2022

## 2022-07-07 NOTE — TELEPHONE ENCOUNTER
[facility] Note  Quick DC. Inappropriate Request   Refill request requires further review by MD: NO   Medication Therapy Plan: Pharmacy is requesting new script(s) for the following medications without required information, (sig/ frequency/qty/etc)     [facility] action(s):  Quick Discontinue      Duplicate Pended Encounter(s)/ Last Prescribed Details:    Pharmacies have been requesting medications for patients without required information, (sig, frequency, qty, etc.). In addition, requests are sent for medication(s) pt. are currently not taking, and medications patients have never taken.    We have spoken to the pharmacies about these request types and advised their teams previously that we are unable to assess these New Script requests and require all details for these requests. This is a known issue and has been reported.        Medication related problems are not assessed for QDC.   Medication Reconciliation Completed? NO Were there pending details that required adjustment? NO     Automatic Epic Generated Protocol Data Below:   Requested Prescriptions   Pending Prescriptions Disp Refills    alcohol swabs (BD ALCOHOL SWABS) PadM [Pharmacy Med Name: BD ALCOHOL SWAB PAD]  0              Appointments      Date Provider   Last Visit   12/6/2021 Pablito Jean MD   Next Visit   7/7/2022 Pablito Jean MD        Note composed:11:39 AM 07/07/2022

## 2022-07-07 NOTE — TELEPHONE ENCOUNTER
Care Due:                  Date            Visit Type   Department     Provider  --------------------------------------------------------------------------------                                EP -                              PRIMARY      [facility] FAMILY  Last Visit: 12-      CARE ([facility])   ROJELIO Jean                              EP -                              PRIMARY      [facility] FAMILY  Next Visit: 07-      CARE ([facility])   MEDICINE       Pablito Jean                                                            Last  Test          Frequency    Reason                     Performed    Due Date  --------------------------------------------------------------------------------    CMP.........  12 months..  pravastatin..............  03- 03-    [facility] Embedded Care Gaps. Reference number: [id number]. 7/07/2022   11:10:43 AM ISABELT

## 2022-07-07 NOTE — TELEPHONE ENCOUNTER
No new care gaps identified.  [facility] Embedded Care Gaps. Reference number: [id number]. 7/07/2022   11:11:47 AM TANIYA

## 2022-07-08 ENCOUNTER — TELEPHONE (OUTPATIENT)
Dept: FAMILY MEDICINE | Facility: CLINIC | Age: 77
End: 2022-07-08
Payer: MEDICARE

## 2022-07-08 NOTE — TELEPHONE ENCOUNTER
----- Message from Asha Ghotra sent at 7/8/2022  9:04 AM CDT -----  Type:  Test Results    Who Called: patient  Name of Test (Lab/Mammo/Etc):lab  Date of Test: 7/5  Ordering Provider: Cristina ALBRECHT  Where the test was performed: call back  Would the patient rather a call back or a response via [facility]? Call back  Best Call Back Number: [phone]  Additional Information:  na

## 2022-07-18 ENCOUNTER — TELEPHONE (OUTPATIENT)
Dept: FAMILY MEDICINE | Facility: CLINIC | Age: 77
End: 2022-07-18
Payer: MEDICARE

## 2022-07-18 DIAGNOSIS — Z13.220 ENCOUNTER FOR LIPID SCREENING FOR CARDIOVASCULAR DISEASE: ICD-10-CM

## 2022-07-18 DIAGNOSIS — Z13.6 ENCOUNTER FOR LIPID SCREENING FOR CARDIOVASCULAR DISEASE: ICD-10-CM

## 2022-07-18 DIAGNOSIS — Z00.00 WELL ADULT EXAM: Primary | ICD-10-CM

## 2022-07-18 DIAGNOSIS — Z79.899 ENCOUNTER FOR LONG-TERM (CURRENT) USE OF MEDICATIONS: ICD-10-CM

## 2022-07-18 NOTE — TELEPHONE ENCOUNTER
----- Message from Martina Cooper sent at 7/18/2022 10:57 AM CDT -----  Contact: Saumya  .Type:  Patient Returning Call    Who Called:Saumya   Who Left Message for Patient: unknown   Does the patient know what this is regarding?:unknown   Would the patient rather a call back or a response via [facility]? Call   Best Call Back Number:.[phone]   Additional Information:Patient reports missing a call. Please give patient another call back.

## 2022-07-18 NOTE — TELEPHONE ENCOUNTER
----- Message from Gregor Meek sent at 7/18/2022  9:16 AM CDT -----  Contact: Saumya Coto is calling to get lab orders for blood work to be performed. Please give her a call back at [phone].      
Attempted to call patient, no answer, left vm asking patient to call the office.  
done

## 2022-07-18 NOTE — TELEPHONE ENCOUNTER
I have signed for the following orders AND/OR meds.  Please call the patient and ask the patient to schedule the testing AND/OR inform about any medications that were sent.      Orders Placed This Encounter   Procedures    CBC Without Differential     Standing Status:   Standing     Number of Occurrences:   99     Standing Expiration Date:   9/16/2023    TSH     Standing Status:   Standing     Number of Occurrences:   99     Standing Expiration Date:   9/16/2023    Comprehensive Metabolic Panel     Standing Status:   Standing     Number of Occurrences:   99     Standing Expiration Date:   7/13/2042    Hemoglobin A1C     Standing Status:   Standing     Number of Occurrences:   99     Standing Expiration Date:   7/13/2042    Lipid Panel     Standing Status:   Standing     Number of Occurrences:   99     Standing Expiration Date:   7/13/2042

## 2022-07-19 ENCOUNTER — PES CALL (OUTPATIENT)
Dept: ADMINISTRATIVE | Facility: CLINIC | Age: 77
End: 2022-07-19
Payer: MEDICARE

## 2022-07-19 ENCOUNTER — LAB VISIT (OUTPATIENT)
Dept: LAB | Facility: HOSPITAL | Age: 77
End: 2022-07-19
Attending: FAMILY MEDICINE
Payer: MEDICARE

## 2022-07-19 DIAGNOSIS — Z13.220 ENCOUNTER FOR LIPID SCREENING FOR CARDIOVASCULAR DISEASE: ICD-10-CM

## 2022-07-19 DIAGNOSIS — Z13.6 ENCOUNTER FOR LIPID SCREENING FOR CARDIOVASCULAR DISEASE: ICD-10-CM

## 2022-07-19 DIAGNOSIS — Z00.00 WELL ADULT EXAM: ICD-10-CM

## 2022-07-19 DIAGNOSIS — Z79.899 ENCOUNTER FOR LONG-TERM (CURRENT) USE OF MEDICATIONS: ICD-10-CM

## 2022-07-19 LAB
ALBUMIN SERPL BCP-MCNC: 3.9 G/DL (ref 3.5–5.2)
ALP SERPL-CCNC: 62 U/L (ref 55–135)
ALT SERPL W/O P-5'-P-CCNC: 22 U/L (ref 10–44)
ANION GAP SERPL CALC-SCNC: 12 MMOL/L (ref 8–16)
AST SERPL-CCNC: 22 U/L (ref 10–40)
BILIRUB SERPL-MCNC: 0.4 MG/DL (ref 0.1–1)
BUN SERPL-MCNC: 16 MG/DL (ref 8–23)
CALCIUM SERPL-MCNC: 9.9 MG/DL (ref 8.7–10.5)
CHLORIDE SERPL-SCNC: 103 MMOL/L (ref 95–110)
CHOLEST SERPL-MCNC: 241 MG/DL (ref 120–199)
CHOLEST/HDLC SERPL: 5.6 {RATIO} (ref 2–5)
CO2 SERPL-SCNC: 25 MMOL/L (ref 23–29)
CREAT SERPL-MCNC: 0.7 MG/DL (ref 0.5–1.4)
ERYTHROCYTE [DISTWIDTH] IN BLOOD BY AUTOMATED COUNT: 13.9 % (ref 11.5–14.5)
EST. GFR  (AFRICAN AMERICAN): >60 ML/MIN/1.73 M^2
EST. GFR  (NON AFRICAN AMERICAN): >60 ML/MIN/1.73 M^2
ESTIMATED AVG GLUCOSE: 154 MG/DL (ref 68–131)
GLUCOSE SERPL-MCNC: 118 MG/DL (ref 70–110)
HBA1C MFR BLD: 7 % (ref 4–5.6)
HCT VFR BLD AUTO: 43.1 % (ref 37–48.5)
HDLC SERPL-MCNC: 43 MG/DL (ref 40–75)
HDLC SERPL: 17.8 % (ref 20–50)
HGB BLD-MCNC: 13.9 G/DL (ref 12–16)
LDLC SERPL CALC-MCNC: 122.4 MG/DL (ref 63–159)
MCH RBC QN AUTO: 30.8 PG (ref 27–31)
MCHC RBC AUTO-ENTMCNC: 32.3 G/DL (ref 32–36)
MCV RBC AUTO: 95 FL (ref 82–98)
NONHDLC SERPL-MCNC: 198 MG/DL
PLATELET # BLD AUTO: 229 K/UL (ref 150–450)
PMV BLD AUTO: 9.7 FL (ref 9.2–12.9)
POTASSIUM SERPL-SCNC: 3.7 MMOL/L (ref 3.5–5.1)
PROT SERPL-MCNC: 7.1 G/DL (ref 6–8.4)
RBC # BLD AUTO: 4.52 M/UL (ref 4–5.4)
SODIUM SERPL-SCNC: 140 MMOL/L (ref 136–145)
TRIGL SERPL-MCNC: 378 MG/DL (ref 30–150)
TSH SERPL DL<=0.005 MIU/L-ACNC: 2.41 UIU/ML (ref 0.4–4)
WBC # BLD AUTO: 5.9 K/UL (ref 3.9–12.7)

## 2022-07-19 PROCEDURE — 84443 ASSAY THYROID STIM HORMONE: CPT | Performed by: FAMILY MEDICINE

## 2022-07-19 PROCEDURE — 85027 COMPLETE CBC AUTOMATED: CPT | Performed by: FAMILY MEDICINE

## 2022-07-19 PROCEDURE — 36415 COLL VENOUS BLD VENIPUNCTURE: CPT | Mod: PO | Performed by: FAMILY MEDICINE

## 2022-07-19 PROCEDURE — 80053 COMPREHEN METABOLIC PANEL: CPT | Performed by: FAMILY MEDICINE

## 2022-07-19 PROCEDURE — 83036 HEMOGLOBIN GLYCOSYLATED A1C: CPT | Performed by: FAMILY MEDICINE

## 2022-07-19 PROCEDURE — 80061 LIPID PANEL: CPT | Performed by: FAMILY MEDICINE

## 2022-07-22 ENCOUNTER — OFFICE VISIT (OUTPATIENT)
Dept: FAMILY MEDICINE | Facility: CLINIC | Age: 77
End: 2022-07-22
Payer: MEDICARE

## 2022-07-22 VITALS
WEIGHT: 124.88 LBS | BODY MASS INDEX: 23.58 KG/M2 | TEMPERATURE: 97 F | HEART RATE: 64 BPM | DIASTOLIC BLOOD PRESSURE: 70 MMHG | OXYGEN SATURATION: 99 % | SYSTOLIC BLOOD PRESSURE: 130 MMHG | HEIGHT: 61 IN

## 2022-07-22 DIAGNOSIS — E78.5 HYPERLIPIDEMIA ASSOCIATED WITH TYPE 2 DIABETES MELLITUS: ICD-10-CM

## 2022-07-22 DIAGNOSIS — T46.6X5A MYALGIA DUE TO STATIN: ICD-10-CM

## 2022-07-22 DIAGNOSIS — Z13.220 ENCOUNTER FOR LIPID SCREENING FOR CARDIOVASCULAR DISEASE: ICD-10-CM

## 2022-07-22 DIAGNOSIS — M79.10 MYALGIA DUE TO STATIN: ICD-10-CM

## 2022-07-22 DIAGNOSIS — Z00.00 WELL ADULT EXAM: Primary | ICD-10-CM

## 2022-07-22 DIAGNOSIS — Z79.899 ENCOUNTER FOR LONG-TERM (CURRENT) USE OF MEDICATIONS: ICD-10-CM

## 2022-07-22 DIAGNOSIS — Z13.6 ENCOUNTER FOR LIPID SCREENING FOR CARDIOVASCULAR DISEASE: ICD-10-CM

## 2022-07-22 DIAGNOSIS — E11.65 TYPE 2 DIABETES MELLITUS WITH HYPERGLYCEMIA, WITHOUT LONG-TERM CURRENT USE OF INSULIN: ICD-10-CM

## 2022-07-22 DIAGNOSIS — Z78.0 MENOPAUSE: ICD-10-CM

## 2022-07-22 DIAGNOSIS — L30.9 DERMATITIS: ICD-10-CM

## 2022-07-22 DIAGNOSIS — E11.69 HYPERLIPIDEMIA ASSOCIATED WITH TYPE 2 DIABETES MELLITUS: ICD-10-CM

## 2022-07-22 PROCEDURE — 1159F PR MEDICATION LIST DOCUMENTED IN MEDICAL RECORD: ICD-10-PCS | Mod: CPTII,S$GLB,, | Performed by: FAMILY MEDICINE

## 2022-07-22 PROCEDURE — 1159F MED LIST DOCD IN RCRD: CPT | Mod: CPTII,S$GLB,, | Performed by: FAMILY MEDICINE

## 2022-07-22 PROCEDURE — 1101F PT FALLS ASSESS-DOCD LE1/YR: CPT | Mod: CPTII,S$GLB,, | Performed by: FAMILY MEDICINE

## 2022-07-22 PROCEDURE — 1160F RVW MEDS BY RX/DR IN RCRD: CPT | Mod: CPTII,S$GLB,, | Performed by: FAMILY MEDICINE

## 2022-07-22 PROCEDURE — 99397 PR PREVENTIVE VISIT,EST,65 & OVER: ICD-10-PCS | Mod: S$GLB,,, | Performed by: FAMILY MEDICINE

## 2022-07-22 PROCEDURE — 3072F LOW RISK FOR RETINOPATHY: CPT | Mod: CPTII,S$GLB,, | Performed by: FAMILY MEDICINE

## 2022-07-22 PROCEDURE — 99999 PR PBB SHADOW E&M-EST. PATIENT-LVL V: CPT | Mod: PBBFAC,,, | Performed by: FAMILY MEDICINE

## 2022-07-22 PROCEDURE — 1160F PR REVIEW ALL MEDS BY PRESCRIBER/CLIN PHARMACIST DOCUMENTED: ICD-10-PCS | Mod: CPTII,S$GLB,, | Performed by: FAMILY MEDICINE

## 2022-07-22 PROCEDURE — 3075F SYST BP GE 130 - 139MM HG: CPT | Mod: CPTII,S$GLB,, | Performed by: FAMILY MEDICINE

## 2022-07-22 PROCEDURE — 3288F PR FALLS RISK ASSESSMENT DOCUMENTED: ICD-10-PCS | Mod: CPTII,S$GLB,, | Performed by: FAMILY MEDICINE

## 2022-07-22 PROCEDURE — 3078F PR MOST RECENT DIASTOLIC BLOOD PRESSURE < 80 MM HG: ICD-10-PCS | Mod: CPTII,S$GLB,, | Performed by: FAMILY MEDICINE

## 2022-07-22 PROCEDURE — 3288F FALL RISK ASSESSMENT DOCD: CPT | Mod: CPTII,S$GLB,, | Performed by: FAMILY MEDICINE

## 2022-07-22 PROCEDURE — 1126F AMNT PAIN NOTED NONE PRSNT: CPT | Mod: CPTII,S$GLB,, | Performed by: FAMILY MEDICINE

## 2022-07-22 PROCEDURE — 3075F PR MOST RECENT SYSTOLIC BLOOD PRESS GE 130-139MM HG: ICD-10-PCS | Mod: CPTII,S$GLB,, | Performed by: FAMILY MEDICINE

## 2022-07-22 PROCEDURE — 3078F DIAST BP <80 MM HG: CPT | Mod: CPTII,S$GLB,, | Performed by: FAMILY MEDICINE

## 2022-07-22 PROCEDURE — 1101F PR PT FALLS ASSESS DOC 0-1 FALLS W/OUT INJ PAST YR: ICD-10-PCS | Mod: CPTII,S$GLB,, | Performed by: FAMILY MEDICINE

## 2022-07-22 PROCEDURE — 99999 PR PBB SHADOW E&M-EST. PATIENT-LVL V: ICD-10-PCS | Mod: PBBFAC,,, | Performed by: FAMILY MEDICINE

## 2022-07-22 PROCEDURE — 99397 PER PM REEVAL EST PAT 65+ YR: CPT | Mod: S$GLB,,, | Performed by: FAMILY MEDICINE

## 2022-07-22 PROCEDURE — 3072F PR LOW RISK FOR RETINOPATHY: ICD-10-PCS | Mod: CPTII,S$GLB,, | Performed by: FAMILY MEDICINE

## 2022-07-22 PROCEDURE — 1126F PR PAIN SEVERITY QUANTIFIED, NO PAIN PRESENT: ICD-10-PCS | Mod: CPTII,S$GLB,, | Performed by: FAMILY MEDICINE

## 2022-07-22 RX ORDER — HYDROCORTISONE 25 MG/G
CREAM TOPICAL 2 TIMES DAILY
Qty: 454 G | Refills: 3 | Status: SHIPPED | OUTPATIENT
Start: 2022-07-22 | End: 2023-07-11

## 2022-07-22 NOTE — ASSESSMENT & PLAN NOTE
A1c improved.  Continue current medication regimen.  We will plan to monitor hemoglobin A1c at designated intervals 3 to 6 months.  I recommend ongoing Education for diabetic diet and exercise protocol.  We will continue to monitor for side effects.    Please be advised of symptoms to monitor for and to notify me immediately if persistent or worsening.  Follow up with Ophthalmology/Optometry and Podiatry at least annually.

## 2022-07-22 NOTE — ASSESSMENT & PLAN NOTE
Trial of fish oil, niacin or Co Q10.  Recheck lipid panel in 3 to 6 months.  If no improvement trial of Zetia.Counseled on hyperlipidemia disease course, healthy diet and increased need for exercise.  Please be advised of the risk of cardiovascular disease, increase stroke and heart attack risk with uncontrolled/untreated hyperlipidemia.     Patient voiced understanding and understood the treatment plan. All questions were answered.

## 2022-07-22 NOTE — PROGRESS NOTES
PLAN:      Problem List Items Addressed This Visit     Encounter for long-term (current) use of medications (Chronic)    Type 2 diabetes mellitus with hyperglycemia, without long-term current use of insulin (Chronic)     A1c improved.  Continue current medication regimen.  We will plan to monitor hemoglobin A1c at designated intervals 3 to 6 months.  I recommend ongoing Education for diabetic diet and exercise protocol.  We will continue to monitor for side effects.    Please be advised of symptoms to monitor for and to notify me immediately if persistent or worsening.  Follow up with Ophthalmology/Optometry and Podiatry at least annually.             Hyperlipidemia associated with type 2 diabetes mellitus (Chronic)     Trial of fish oil, niacin or Co Q10.  Recheck lipid panel in 3 to 6 months.  If no improvement trial of Zetia.Counseled on hyperlipidemia disease course, healthy diet and increased need for exercise.  Please be advised of the risk of cardiovascular disease, increase stroke and heart attack risk with uncontrolled/untreated hyperlipidemia.     Patient voiced understanding and understood the treatment plan. All questions were answered.                Dermatitis     Start steroid cream.  Stop the supplement that was started around the same time.  If no improvement follow-up/establish care with Dermatology for biopsy.  ER precautions.  Start over-the-counter Claritin or Zyrtec for itching.           Relevant Medications    hydrocortisone 2.5 % cream    Other Relevant Orders    Ambulatory referral/consult to Dermatology    Myalgia due to statin     Consider trial of Zetia if no improvement in cholesterol.           Menopause    Relevant Orders    DXA Bone Density Spine And Hip      Other Visit Diagnoses     Well adult exam    -  Primary    Encounter for lipid screening for cardiovascular disease            Future Appointments     Date Specialty Appt Notes    1/20/2023 Lab .         Medication Management  for assessment above:   Medication List with Changes/Refills   New Medications    HYDROCORTISONE 2.5 % CREAM    Apply topically 2 (two) times daily.   Current Medications    BLOOD SUGAR DIAGNOSTIC STRP    TEST BLOOD SUGAR ONCE DAILY    CLOPIDOGREL (PLAVIX) 75 MG TABLET    Take 75 mg by mouth once daily.     FLUTICASONE PROPIONATE (FLONASE) 50 MCG/ACTUATION NASAL SPRAY    2 sprays (100 mcg total) by Each Nostril route once daily.    FUROSEMIDE (LASIX) 20 MG TABLET    Take 1 tablet (20 mg total) by mouth once daily.    JARDIANCE 25 MG TABLET    Take 1 tablet (25 mg total) by mouth once daily.    LACTOBACILLUS RHAMNOSUS GG (CULTURELLE) 10 BILLION CELL CAPSULE    Take 1 capsule by mouth once daily.    METOPROLOL SUCCINATE (TOPROL-XL) 100 MG 24 HR TABLET    Take 1 tablet (100 mg total) by mouth daily    MULTIVITAMIN CAPSULE    Take 1 capsule by mouth once daily.    OMEGA-3 ACID ETHYL ESTERS (LOVAZA) 1 GRAM CAPSULE    Take 1 capsule by mouth once daily.    VITAMINS  A,C,E-ZINC-COPPER (PRESERVISION AREDS) 14,320-226-200 UNIT-MG-UNIT CAP    Take 1 capsule by mouth once daily.   Discontinued Medications    COENZYME Q10 100 MG CAPSULE    Take 100 mg by mouth once daily.    FLUAD QUAD 2021-22,65Y UP,,PF, 60 MCG (15 MCG X 4)/0.5 ML SYRG        FLUOROURACIL (EFUDEX) 5 % CREAM    AAA bid x 2 weeks    PRAVASTATIN (PRAVACHOL) 20 MG TABLET    Take 1 tablet (20 mg total) by mouth once daily.       Pablito Jean M.D.  ==========================================================================  Subjective:   Patient ID: Saumya Braun is a 77 y.o. female.  has a past medical history of Cataract, Chronic heart failure with preserved ejection fraction (3/29/2021), Chronic systolic heart failure, Coronary artery disease involving native coronary artery of native heart without angina pectoris (4/7/2020), Hypertension associated with diabetes (12/8/2015), Ischemic cardiomyopathy, Macrocytosis without anemia (3/26/2021), Mixed  hyperlipidemia, Multiple pulmonary nodules (3/26/2020), NSTEMI (non-ST elevated myocardial infarction) (3/26/2020), Post-menopause on HRT (hormone replacement therapy) (9/17/2020), and Squamous cell carcinoma of skin.   Chief Complaint: discuss labs      Problem List Items Addressed This Visit     Encounter for long-term (current) use of medications (Chronic)    Overview     CHRONIC. Stable. Compliant with medications for managed conditions. See medication list. No SE reported.   Routine lab analysis is being monitored. Refills were addressed.  Lab Results   Component Value Date    WBC 6.64 03/22/2021    HGB 14.3 03/22/2021    HCT 45.6 03/22/2021     (H) 03/22/2021     03/22/2021         Chemistry        Component Value Date/Time     03/22/2021 0829    K 3.7 03/22/2021 0829     03/22/2021 0829    CO2 27 03/22/2021 0829    BUN 18 03/22/2021 0829    CREATININE 0.8 03/22/2021 0829     (H) 03/22/2021 0829        Component Value Date/Time    CALCIUM 10.0 03/22/2021 0829    ALKPHOS 53 (L) 03/22/2021 0829    AST 33 03/22/2021 0829    ALT 43 03/22/2021 0829    BILITOT 0.5 03/22/2021 0829    ESTGFRAFRICA >60.0 03/22/2021 0829    EGFRNONAA >60.0 03/22/2021 0829          Lab Results   Component Value Date    TSH 2.772 03/22/2021                Type 2 diabetes mellitus with hyperglycemia, without long-term current use of insulin (Chronic)    Overview     December 2021 Diabetes Management StatusStatin: Not takingACE/ARB: Not taking A1c 7.4  July 2022::  Diabetes Management Status    Statin: Taking  ACE/ARB: Not taking    Screening or Prevention Patient's value Goal Complete/Controlled?   HgA1C Testing and Control   Lab Results   Component Value Date    HGBA1C 7.0 (H) 07/19/2022      Annually/Less than 8% Yes   Lipid profile : 07/19/2022 Annually Yes   LDL control Lab Results   Component Value Date    LDLCALC 122.4 07/19/2022    Annually/Less than 100 mg/dl  No   Nephropathy screening Lab Results    Component Value Date    LABMICR <5.0 12/06/2021     Lab Results   Component Value Date    PROTEINUA SEE COMMENT 02/04/2020     No results found for: UTPCR   Annually Yes   Blood pressure BP Readings from Last 1 Encounters:   07/22/22 130/70    Less than 140/90 Yes   Dilated retinal exam : 03/14/2022 Annually Yes   Foot exam   : 03/29/2021 Annually No                Current Assessment & Plan     A1c improved.  Continue current medication regimen.  We will plan to monitor hemoglobin A1c at designated intervals 3 to 6 months.  I recommend ongoing Education for diabetic diet and exercise protocol.  We will continue to monitor for side effects.    Please be advised of symptoms to monitor for and to notify me immediately if persistent or worsening.  Follow up with Ophthalmology/Optometry and Podiatry at least annually.             Hyperlipidemia associated with type 2 diabetes mellitus (Chronic)    Overview     CHRONIC.  July 2022:  Patient could not tolerate Pravastatin due to brain fog.  Uncontrolled.  Patient reports that she cannot tolerate rosuvastatin due to side effects.. Lab analysis reviewed.   (-) CP, SOB, abdominal pain, N/V/D, constipation, jaundice, skin changes.  (-) Myalgias  Lab Results   Component Value Date    CHOL 241 (H) 07/19/2022    CHOL 272 (H) 12/08/2021    CHOL 160 03/22/2021     Lab Results   Component Value Date    HDL 43 07/19/2022    HDL 45 12/08/2021    HDL 53 03/22/2021     Lab Results   Component Value Date    LDLCALC 122.4 07/19/2022    LDLCALC 160.4 (H) 12/08/2021    LDLCALC 72.2 03/22/2021     Lab Results   Component Value Date    TRIG 378 (H) 07/19/2022    TRIG 333 (H) 12/08/2021    TRIG 174 (H) 03/22/2021     Lab Results   Component Value Date    CHOLHDL 17.8 (L) 07/19/2022    CHOLHDL 16.5 (L) 12/08/2021    CHOLHDL 33.1 03/22/2021     Lab Results   Component Value Date    TOTALCHOLEST 5.6 (H) 07/19/2022    TOTALCHOLEST 6.0 (H) 12/08/2021    TOTALCHOLEST 3.0 03/22/2021     Lab Results    Component Value Date    ALT 22 07/19/2022    AST 22 07/19/2022    ALKPHOS 62 07/19/2022    BILITOT 0.4 07/19/2022     ======================================================  The ASCVD Risk score (Sandra OCASIO Jr., et al., 2013) failed to calculate for the following reasons:    The patient has a prior MI or stroke diagnosis             Current Assessment & Plan     Trial of fish oil, niacin or Co Q10.  Recheck lipid panel in 3 to 6 months.  If no improvement trial of Zetia.Counseled on hyperlipidemia disease course, healthy diet and increased need for exercise.  Please be advised of the risk of cardiovascular disease, increase stroke and heart attack risk with uncontrolled/untreated hyperlipidemia.     Patient voiced understanding and understood the treatment plan. All questions were answered.                Dermatitis    Overview     New problem.  Started over the last few weeks.  Patient has noticed a rash that is occurring on the extremities and the trunk.  It is very itchy.  Not painful.  Patient did start a supplement multivitamin several weeks ago around the same time.  She has tried alcohol with some relief.  No steroid cream.           Current Assessment & Plan     Start steroid cream.  Stop the supplement that was started around the same time.  If no improvement follow-up/establish care with Dermatology for biopsy.  ER precautions.  Start over-the-counter Claritin or Zyrtec for itching.           Myalgia due to statin    Overview     Patient has not tolerated rosuvastatin or pravastatin.           Current Assessment & Plan     Consider trial of Zetia if no improvement in cholesterol.           Menopause    Overview     Patient is due for bone density scan.             Other Visit Diagnoses     Well adult exam    -  Primary    Encounter for lipid screening for cardiovascular disease               Review of patient's allergies indicates:   Allergen Reactions    Pravastatin      Brain fog    Rosuvastatin       "Dizziness       Current Outpatient Medications   Medication Instructions    blood sugar diagnostic Strp TEST BLOOD SUGAR ONCE DAILY    clopidogreL (PLAVIX) 75 mg, Oral, Daily    fluticasone propionate (FLONASE) 100 mcg, Each Nostril, Daily    furosemide (LASIX) 20 mg, Oral, Daily    hydrocortisone 2.5 % cream Topical (Top), 2 times daily    JARDIANCE 25 mg, Oral, Daily    Lactobacillus rhamnosus GG (CULTURELLE) 10 billion cell capsule 1 capsule, Oral, Daily    metoprolol succinate (TOPROL-XL) 100 MG 24 hr tablet Take 1 tablet (100 mg total) by mouth daily    multivitamin capsule 1 capsule, Oral, Daily    omega-3 acid ethyl esters (LOVAZA) 1 gram capsule 1 capsule, Oral, Daily    vitamins  A,C,E-zinc-copper (PRESERVISION AREDS) 14,320-226-200 unit-mg-unit Cap 1 capsule, Oral, Daily      I have reviewed the PMH, social history, FamilyHx, surgical history, allergies and medications documented / confirmed by the patient at the time of this visit.  Review of Systems   Constitutional: Negative for chills, fatigue, fever and unexpected weight change.   HENT: Negative for ear pain and sore throat.    Eyes: Negative for redness and visual disturbance.   Respiratory: Negative for cough and shortness of breath.    Cardiovascular: Negative for chest pain and palpitations.   Gastrointestinal: Negative for nausea and vomiting.   Genitourinary: Negative for difficulty urinating and hematuria.   Musculoskeletal: Negative for arthralgias and myalgias.   Skin: Positive for rash. Negative for wound.   Neurological: Negative for weakness and headaches.   Psychiatric/Behavioral: Negative for sleep disturbance. The patient is not nervous/anxious.      Objective:   /70   Pulse 64   Temp 97.3 °F (36.3 °C) (Other (see comments))   Ht 5' 1" (1.549 m)   Wt 56.7 kg (124 lb 14.4 oz)   LMP 11/14/1986   SpO2 99%   BMI 23.60 kg/m²   Physical Exam  Vitals and nursing note reviewed.   Constitutional:       General: She is not " in acute distress.     Appearance: She is well-developed. She is not ill-appearing, toxic-appearing or diaphoretic.   HENT:      Head: Normocephalic and atraumatic.      Right Ear: Hearing and external ear normal.      Left Ear: Hearing and external ear normal.      Nose: Nose normal. No rhinorrhea.   Eyes:      General: Lids are normal.      Extraocular Movements: Extraocular movements intact.      Conjunctiva/sclera: Conjunctivae normal.      Pupils: Pupils are equal, round, and reactive to light.   Cardiovascular:      Rate and Rhythm: Normal rate.      Pulses: Normal pulses.   Pulmonary:      Effort: Pulmonary effort is normal. No respiratory distress.      Breath sounds: Normal breath sounds.   Musculoskeletal:         General: Normal range of motion.      Cervical back: Normal range of motion and neck supple.   Skin:     General: Skin is warm and dry.      Capillary Refill: Capillary refill takes less than 2 seconds.      Coloration: Skin is not pale.      Findings: Rash present.   Neurological:      General: No focal deficit present.      Mental Status: She is alert and oriented to person, place, and time. She is not disoriented.   Psychiatric:         Attention and Perception: She is attentive.         Mood and Affect: Mood normal. Mood is not anxious or depressed.         Speech: Speech is not rapid and pressured or slurred.         Behavior: Behavior normal. Behavior is not agitated, aggressive or hyperactive. Behavior is cooperative.         Thought Content: Thought content normal. Thought content is not paranoid or delusional. Thought content does not include homicidal or suicidal ideation. Thought content does not include homicidal or suicidal plan.         Cognition and Memory: Memory is not impaired.         Judgment: Judgment normal.                     Assessment:     1. Well adult exam    2. Dermatitis    3. Myalgia due to statin    4. Hyperlipidemia associated with type 2 diabetes mellitus    5.  Type 2 diabetes mellitus with hyperglycemia, without long-term current use of insulin    6. Menopause    7. Encounter for long-term (current) use of medications    8. Encounter for lipid screening for cardiovascular disease      MDM:     Total time: 32 minutes.  This includes total time spent on the encounter, which includes face to face time and non-face to face time preparing to see the patient (eg, review of previous medical records, tests), Obtaining and/or reviewing separately obtained history, documenting clinical information in the electronic or other health record, independently interpreting results (not separately reported)/communicating results to the patient/family/caregiver, and/or care coordination (not separately reported).    I have Reviewed and summarized old records.  I have performed thorough medication reconciliation today and discussed risk and benefits of medications.  I have reviewed labs and discussed with patient.  All questions were answered.  I am requesting old records and will review them once they are available.    I have signed for the following orders AND/OR meds.  Orders Placed This Encounter   Procedures    DXA Bone Density Spine And Hip     Standing Status:   Future     Standing Expiration Date:   7/22/2025     Order Specific Question:   May the Radiologist modify the order per protocol to meet the clinical needs of the patient?     Answer:   Yes     Order Specific Question:   Release to patient     Answer:   Immediate    Ambulatory referral/consult to Dermatology     Standing Status:   Future     Standing Expiration Date:   8/22/2023     Referral Priority:   Routine     Referral Type:   Consultation     Referral Reason:   Specialty Services Required     Referred to Provider:   Ever Ramos III, MD     Requested Specialty:   Dermatology     Number of Visits Requested:   1     Medications Ordered This Encounter   Medications    hydrocortisone 2.5 % cream     Sig: Apply topically  2 (two) times daily.     Dispense:  454 g     Refill:  3        Follow up in about 6 months (around 1/22/2023), or if symptoms worsen or fail to improve, for Med refills, LAB RESULTS.  Future Appointments     Date Specialty Appt Notes    1/20/2023 Lab .        If no improvement in symptoms or symptoms worsen, advised to call/follow-up at clinic or go to ER. Patient voiced understanding and all questions/concerns were addressed.   DISCLAIMER: This note was compiled by using a speech recognition dictation system and therefore please be aware that typographical / speech recognition errors can and do occur.  Please contact me if you see any errors specifically.    Pablito Jean M.D.       Office: [phone]   [address] [place] [address]  FAX: [phone]

## 2022-07-22 NOTE — PATIENT INSTRUCTIONS
Follow up in about 6 months (around 1/22/2023), or if symptoms worsen or fail to improve, for Med refills, LAB RESULTS.     Dear patient,   As a result of recent federal legislation (The Federal Cures Act), you may receive lab or pathology results from your visit in your [facility] account before your physician is able to contact you. Your physician or their representative will relay the results to you with their recommendations at their soonest availability.     If no improvement in symptoms or symptoms worsen, please be advised to call MD, follow-up at clinic and/or go to ER if becomes severe.    Pablito Jean M.D.        We Offer TELEHEALTH & Same Day Appointments!   Book your Telehealth appointment with me through my nurse or   Clinic appointments on [facility]!    [address] [place] [address]    Office: [phone]   FAX: [phone]    Check out my Facebook Page and Follow Me at: [email or web address]    Check out my website at [facility] by clicking on: [email or web address]    To Schedule appointments online, go to [facility]: [email or web address]

## 2022-07-22 NOTE — PROGRESS NOTES
This note is specifically for wellness visit performed today.   WELLNESS EXAM      Patient ID: Saumya Braun is a 77 y.o. female with  has a past medical history of Cataract, Chronic heart failure with preserved ejection fraction (3/29/2021), Chronic systolic heart failure, Coronary artery disease involving native coronary artery of native heart without angina pectoris (4/7/2020), Hypertension associated with diabetes (12/8/2015), Ischemic cardiomyopathy, Macrocytosis without anemia (3/26/2021), Mixed hyperlipidemia, Multiple pulmonary nodules (3/26/2020), NSTEMI (non-ST elevated myocardial infarction) (3/26/2020), Post-menopause on HRT (hormone replacement therapy) (9/17/2020), and Squamous cell carcinoma of skin.   Chief Complaint:  Encounter for wellness exam    Well Adult Physical: Patient here for a comprehensive physical exam.The patient reports Chronic problems.  See other note  Do you take any herbs or supplements that were not prescribed by a doctor?  Yes   Are you taking calcium supplements?  Yes   History:   LMP: Patient's last menstrual period was 11/14/1986.   MMG:  No relevant family history has been documented.  No longer indicated  PAP: No result found declines  Colon cancer screening:   deferred given her age  Bone density scan:   EXAMINATION:  DEXA BONE DENSITY SPINE HIP     CLINICAL HISTORY:  Asymptomatic menopausal state     TECHNIQUE:  DXA scanning was performed over the left hip and lumbar spine.  Review of the images confirms satisfactory positioning and technique.     COMPARISON:  None     FINDINGS:  The L1 to L4 vertebral bone mineral density is equal to 2.008 g/cm squared with a T score of 6.9.     The left femoral neck bone mineral density is equal to 1.113 g/cm squared with a T score of 0.5.     Impression:     No evidence of significant bone density loss     Consider FDA approved medical therapies in postmenopausal women and men aged 50 years and older, based on the  following:     *A hip or vertebral (clinical or morphometric) fracture  *T score less than or equal to -2.5 at the femoral neck or spine after appropriate evaluation to exclude secondary causes.  *Low bone mass -- also known as osteopenia (T score between -1.0 and -2.5 at the femoral neck or spine) and a 10 year probability of hip fracture greater than or equal to 3% or a 10 year probability of major osteoporosis-related fracture greater than or equal to 20% based on the [place]-adapted WHO algorithm.  *Clinicians judgment and/or patient preference may indicate treatment for people with 10 year fracture probabilities is above or below these levels.        Electronically signed by: Massimo Paulino MD  Date:                                            02/13/2020  Time:                                           15:29             Exam Ended: 02/13/20 10:35             Health Maintenance Topics with due status: Not Due       Topic Last Completion Date    Influenza Vaccine 10/20/2021    Diabetes Urine Screening 12/06/2021    Eye Exam 03/14/2022    Lipid Panel 07/19/2022    Hemoglobin A1c 07/19/2022    Aspirin/Antiplatelet Therapy 07/22/2022      ==============================================  History reviewed.   Health Maintenance Due   Topic Date Due    COVID-19 Vaccine (4 - Booster for Pfizer series) 02/05/2022    DEXA Scan  02/13/2022    Foot Exam  03/29/2022       Past Medical History:  Past Medical History:   Diagnosis Date    Cataract     OU    Chronic heart failure with preserved ejection fraction 3/29/2021    Chronic systolic heart failure     Coronary artery disease involving native coronary artery of native heart without angina pectoris 4/7/2020    Hypertension associated with diabetes 12/8/2015    Ischemic cardiomyopathy     Macrocytosis without anemia 3/26/2021    Mixed hyperlipidemia     Multiple pulmonary nodules 3/26/2020    NSTEMI (non-ST elevated myocardial infarction) 3/26/2020    Post-menopause  on HRT (hormone replacement therapy) 9/17/2020    Squamous cell carcinoma of skin     right scalp and mid nasal bridge     Past Surgical History:   Procedure Laterality Date    CARDIAC CATHETERIZATION  04/02/2020    [place]:  Dr Viera    CATARACT EXTRACTION, BILATERAL      CORONARY ARTERY BYPASS GRAFT  06/10/2020    HYSTERECTOMY      age 38 TAHBSO on patch for 27 years    SKIN BIOPSY       Review of patient's allergies indicates:   Allergen Reactions    Pravastatin      Brain fog    Rosuvastatin      Dizziness       Current Outpatient Medications on File Prior to Visit   Medication Sig Dispense Refill    blood sugar diagnostic Strp TEST BLOOD SUGAR ONCE DAILY 300 each 3    clopidogreL (PLAVIX) 75 mg tablet Take 75 mg by mouth once daily.       fluticasone propionate (FLONASE) 50 mcg/actuation nasal spray 2 sprays (100 mcg total) by Each Nostril route once daily. 16 g 6    furosemide (LASIX) 20 MG tablet Take 1 tablet (20 mg total) by mouth once daily. 30 tablet 0    JARDIANCE 25 mg tablet Take 1 tablet (25 mg total) by mouth once daily. 30 tablet 0    metoprolol succinate (TOPROL-XL) 100 MG 24 hr tablet Take 1 tablet (100 mg total) by mouth daily      Lactobacillus rhamnosus GG (CULTURELLE) 10 billion cell capsule Take 1 capsule by mouth once daily.      multivitamin capsule Take 1 capsule by mouth once daily. (Patient not taking: Reported on 7/22/2022)      omega-3 acid ethyl esters (LOVAZA) 1 gram capsule Take 1 capsule by mouth once daily.      vitamins  A,C,E-zinc-copper (PRESERVISION AREDS) 14,320-226-200 unit-mg-unit Cap Take 1 capsule by mouth once daily.      [DISCONTINUED] coenzyme Q10 100 mg capsule Take 100 mg by mouth once daily.      [DISCONTINUED] FLUAD QUAD 2021-22,65Y UP,,PF, 60 mcg (15 mcg x 4)/0.5 mL Syrg       [DISCONTINUED] fluorouraciL (EFUDEX) 5 % cream AAA bid x 2 weeks 40 g 0    [DISCONTINUED] pravastatin (PRAVACHOL) 20 MG tablet Take 1 tablet (20 mg total) by mouth  once daily. (Patient not taking: Reported on 7/22/2022) 90 tablet 4     Current Facility-Administered Medications on File Prior to Visit   Medication Dose Route Frequency Provider Last Rate Last Admin    [DISCONTINUED] acetaminophen tablet 650 mg  650 mg Oral Once PRN Cristina Valle NP        [DISCONTINUED] albuterol inhaler 2 puff  2 puff Inhalation Q20 Min PRN Cristina Valle NP        [DISCONTINUED] diphenhydrAMINE injection 25 mg  25 mg Intravenous Once PRN Cristina Valle NP        [DISCONTINUED] EPINEPHrine (EPIPEN) 0.3 mg/0.3 mL pen injection 0.3 mg  0.3 mg Intramuscular PRN Cristina Valle NP        [DISCONTINUED] methylPREDNISolone sodium succinate injection 40 mg  40 mg Intravenous Once PRN Cristina Valle NP        [DISCONTINUED] ondansetron injection 4 mg  4 mg Intravenous Once PRN Cristina Valle NP        [DISCONTINUED] sodium chloride 0.9% 500 mL flush bag   Intravenous HAN Valle NP   Stopped at 12/31/21 1213    [DISCONTINUED] sodium chloride 0.9% flush 10 mL  10 mL Intravenous PRDELMA Valle NP         Social History     Socioeconomic History    Marital status:    Tobacco Use    Smoking status: Never Smoker    Smokeless tobacco: Never Used   Substance and Sexual Activity    Alcohol use: No    Drug use: Never    Sexual activity: Not Currently     Partners: Male     Birth control/protection: See Surgical Hx     Family History   Problem Relation Age of Onset    No Known Problems Mother     No Known Problems Father     Skin cancer Neg Hx     Melanoma Neg Hx        Review of Systems   Constitutional: Negative for chills, fatigue, fever and unexpected weight change.   HENT: Negative for ear pain and sore throat.    Eyes: Negative for redness and visual disturbance.   Respiratory: Negative for cough and shortness of breath.    Cardiovascular: Negative for chest pain and palpitations.   Gastrointestinal: Negative for nausea and vomiting.    Genitourinary: Negative for difficulty urinating and hematuria.   Musculoskeletal: Negative for arthralgias and myalgias.   Skin: Positive for rash. Negative for wound.   Neurological: Negative for weakness and headaches.   Psychiatric/Behavioral: Negative for sleep disturbance. The patient is not nervous/anxious.       Objective:    Nursing note and vitals reviewed.  Vitals:    07/22/22 0958   BP: 130/70   Pulse:    Temp:      Body mass index is 23.6 kg/m².   Physical Exam  Vitals and nursing note reviewed.   Constitutional:       General: She is not in acute distress.     Appearance: She is well-developed.   HENT:      Head: Normocephalic and atraumatic.      Right Ear: External ear normal.      Left Ear: External ear normal.      Nose: Nose normal. No rhinorrhea.   Eyes:      Extraocular Movements: Extraocular movements intact.      Pupils: Pupils are equal, round, and reactive to light.   Cardiovascular:      Rate and Rhythm: Normal rate.      Pulses: Normal pulses.   Pulmonary:      Effort: Pulmonary effort is normal. No respiratory distress.      Breath sounds: Normal breath sounds.   Musculoskeletal:         General: Normal range of motion.      Cervical back: Normal range of motion and neck supple.   Skin:     General: Skin is warm and dry.      Capillary Refill: Capillary refill takes less than 2 seconds.   Neurological:      General: No focal deficit present.      Mental Status: She is alert and oriented to person, place, and time.   Psychiatric:         Mood and Affect: Mood normal.         Behavior: Behavior normal.       Lab Visit on 07/19/2022   Component Date Value Ref Range Status    WBC 07/19/2022 5.90  3.90 - 12.70 K/uL Final    RBC 07/19/2022 4.52  4.00 - 5.40 M/uL Final    Hemoglobin 07/19/2022 13.9  12.0 - 16.0 g/dL Final    Hematocrit 07/19/2022 43.1  37.0 - 48.5 % Final    MCV 07/19/2022 95  82 - 98 fL Final    MCH 07/19/2022 30.8  27.0 - 31.0 pg Final    MCHC 07/19/2022 32.3  32.0 -  36.0 g/dL Final    RDW 07/19/2022 13.9  11.5 - 14.5 % Final    Platelets 07/19/2022 229  150 - 450 K/uL Final    MPV 07/19/2022 9.7  9.2 - 12.9 fL Final    TSH 07/19/2022 2.409  0.400 - 4.000 uIU/mL Final    Sodium 07/19/2022 140  136 - 145 mmol/L Final    Potassium 07/19/2022 3.7  3.5 - 5.1 mmol/L Final    Chloride 07/19/2022 103  95 - 110 mmol/L Final    CO2 07/19/2022 25  23 - 29 mmol/L Final    Glucose 07/19/2022 118 (A) 70 - 110 mg/dL Final    BUN 07/19/2022 16  8 - 23 mg/dL Final    Creatinine 07/19/2022 0.7  0.5 - 1.4 mg/dL Final    Calcium 07/19/2022 9.9  8.7 - 10.5 mg/dL Final    Total Protein 07/19/2022 7.1  6.0 - 8.4 g/dL Final    Albumin 07/19/2022 3.9  3.5 - 5.2 g/dL Final    Total Bilirubin 07/19/2022 0.4  0.1 - 1.0 mg/dL Final    Comment: For infants and newborns, interpretation of results should be based  on gestational age, weight and in agreement with clinical  observations.    Premature Infant recommended reference ranges:  Up to 24 hours.............<8.0 mg/dL  Up to 48 hours............<12.0 mg/dL  3-5 days..................<15.0 mg/dL  6-29 days.................<15.0 mg/dL      Alkaline Phosphatase 07/19/2022 62  55 - 135 U/L Final    AST 07/19/2022 22  10 - 40 U/L Final    ALT 07/19/2022 22  10 - 44 U/L Final    Anion Gap 07/19/2022 12  8 - 16 mmol/L Final    eGFR if African American 07/19/2022 >60.0  >60 mL/min/1.73 m^2 Final    eGFR if non African American 07/19/2022 >60.0  >60 mL/min/1.73 m^2 Final    Comment: Calculation used to obtain the estimated glomerular filtration  rate (eGFR) is the CKD-EPI equation.       Hemoglobin A1C 07/19/2022 7.0 (A) 4.0 - 5.6 % Final    Comment: ADA Screening Guidelines:  5.7-6.4%  Consistent with prediabetes  >or=6.5%  Consistent with diabetes    High levels of fetal hemoglobin interfere with the HbA1C  assay. Heterozygous hemoglobin variants (HbS, HgC, etc)do  not significantly interfere with this assay.   However, presence of  multiple variants may affect accuracy.      Estimated Avg Glucose 07/19/2022 154 (A) 68 - 131 mg/dL Final    Cholesterol 07/19/2022 241 (A) 120 - 199 mg/dL Final    Comment: The National Cholesterol Education Program (NCEP) has set the  following guidelines (reference ranges) for Cholesterol:  Optimal.....................<200 mg/dL  Borderline High.............200-239 mg/dL  High........................> or = 240 mg/dL      Triglycerides 07/19/2022 378 (A) 30 - 150 mg/dL Final    Comment: The National Cholesterol Education Program (NCEP) has set the  following guidelines (reference values) for triglycerides:  Normal......................<150 mg/dL  Borderline High.............150-199 mg/dL  High........................200-499 mg/dL      HDL 07/19/2022 43  40 - 75 mg/dL Final    Comment: The National Cholesterol Education Program (NCEP) has set the  following guidelines (reference values) for HDL Cholesterol:  Low...............<40 mg/dL  Optimal...........>60 mg/dL      LDL Cholesterol 07/19/2022 122.4  63.0 - 159.0 mg/dL Final    Comment: The National Cholesterol Education Program (NCEP) has set the  following guidelines (reference values) for LDL Cholesterol:  Optimal.......................<130 mg/dL  Borderline High...............130-159 mg/dL  High..........................160-189 mg/dL  Very High.....................>190 mg/dL      HDL/Cholesterol Ratio 07/19/2022 17.8 (A) 20.0 - 50.0 % Final    Total Cholesterol/HDL Ratio 07/19/2022 5.6 (A) 2.0 - 5.0 Final    Non-HDL Cholesterol 07/19/2022 198  mg/dL Final    Comment: Risk category and Non-HDL cholesterol goals:  Coronary heart disease (CHD)or equivalent (10-year risk of CHD >20%):  Non-HDL cholesterol goal     <130 mg/dL  Two or more CHD risk factors and 10-year risk of CHD <= 20%:  Non-HDL cholesterol goal     <160 mg/dL  0 to 1 CHD risk factor:  Non-HDL cholesterol goal     <190 mg/dL        Assessment / Plan:      1.  ANNUAL WELLNESS EXAM -patient  here for annual wellness exam.  Labs ordered.  Health maintenance was reviewed and ordered.  Medications were reviewed and reconciled.   Anticipatory guidance: Don't smoke.  Healthy diet and regular exercise recommended. Vaccine recommendations discussed.  See orders.  Reviewed Anticipatory guidance, risk factor reduction interventions and counseling, Complete history , physical was completed today.  Complete and thorough medication reconciliation was performed.  Discussed risks and benefits of medications.  Advised patient on orders and health maintenance.  We discussed old records and old labs if available.  Will request any records not available through epic.  Continue current medications listed on your summary sheet.    All questions were answered. Patient had no further concerns. Advised of Wellness plan. Follow up in 1 year for ANNUAL WELLNESS EXAM    Orders Placed This Encounter   Procedures    DXA Bone Density Spine And Hip     Standing Status:   Future     Standing Expiration Date:   7/22/2025     Order Specific Question:   May the Radiologist modify the order per protocol to meet the clinical needs of the patient?     Answer:   Yes     Order Specific Question:   Release to patient     Answer:   Immediate    Ambulatory referral/consult to Dermatology     Standing Status:   Future     Standing Expiration Date:   8/22/2023     Referral Priority:   Routine     Referral Type:   Consultation     Referral Reason:   Specialty Services Required     Referred to Provider:   Ever Ramos III, MD     Requested Specialty:   Dermatology     Number of Visits Requested:   1     Medications Ordered This Encounter   Medications    hydrocortisone 2.5 % cream     Sig: Apply topically 2 (two) times daily.     Dispense:  454 g     Refill:  3      Future Appointments     Date Specialty Appt Notes    1/20/2023 Lab .          Pablito Jean MD

## 2022-07-22 NOTE — ASSESSMENT & PLAN NOTE
Start steroid cream.  Stop the supplement that was started around the same time.  If no improvement follow-up/establish care with Dermatology for biopsy.  ER precautions.  Start over-the-counter Claritin or Zyrtec for itching.

## 2022-07-25 DIAGNOSIS — Z95.1 STATUS POST AORTO-CORONARY ARTERY BYPASS GRAFT: Chronic | ICD-10-CM

## 2022-07-25 DIAGNOSIS — E11.65 TYPE 2 DIABETES MELLITUS WITH HYPERGLYCEMIA, WITHOUT LONG-TERM CURRENT USE OF INSULIN: Chronic | ICD-10-CM

## 2022-07-25 DIAGNOSIS — R91.8 MULTIPLE PULMONARY NODULES: Chronic | ICD-10-CM

## 2022-07-25 DIAGNOSIS — E11.9 TYPE 2 DIABETES MELLITUS WITHOUT COMPLICATION, WITHOUT LONG-TERM CURRENT USE OF INSULIN: Chronic | ICD-10-CM

## 2022-07-25 DIAGNOSIS — Z79.82 ASPIRIN LONG-TERM USE: Chronic | ICD-10-CM

## 2022-07-25 DIAGNOSIS — Z13.29 SCREENING FOR THYROID DISORDER: ICD-10-CM

## 2022-07-25 DIAGNOSIS — Z13.21 ENCOUNTER FOR VITAMIN DEFICIENCY SCREENING: ICD-10-CM

## 2022-07-25 DIAGNOSIS — E11.69 HYPERLIPIDEMIA ASSOCIATED WITH TYPE 2 DIABETES MELLITUS: Chronic | ICD-10-CM

## 2022-07-25 DIAGNOSIS — I25.5 ISCHEMIC CARDIOMYOPATHY: Chronic | ICD-10-CM

## 2022-07-25 DIAGNOSIS — E11.59 HYPERTENSION ASSOCIATED WITH DIABETES: ICD-10-CM

## 2022-07-25 DIAGNOSIS — E78.5 HYPERLIPIDEMIA ASSOCIATED WITH TYPE 2 DIABETES MELLITUS: Chronic | ICD-10-CM

## 2022-07-25 DIAGNOSIS — Z13.89 SCREENING FOR HEMATURIA OR PROTEINURIA: ICD-10-CM

## 2022-07-25 DIAGNOSIS — I25.10 CORONARY ARTERY DISEASE INVOLVING NATIVE CORONARY ARTERY OF NATIVE HEART WITHOUT ANGINA PECTORIS: Chronic | ICD-10-CM

## 2022-07-25 DIAGNOSIS — I15.2 HYPERTENSION ASSOCIATED WITH DIABETES: ICD-10-CM

## 2022-07-25 DIAGNOSIS — I50.22 CHRONIC SYSTOLIC HEART FAILURE: Chronic | ICD-10-CM

## 2022-07-25 DIAGNOSIS — Z79.899 LONG TERM CURRENT USE OF DIURETIC: Chronic | ICD-10-CM

## 2022-07-25 DIAGNOSIS — Z13.0 SCREENING FOR DEFICIENCY ANEMIA: ICD-10-CM

## 2022-07-25 DIAGNOSIS — R79.89 LOW VITAMIN B12 LEVEL: ICD-10-CM

## 2022-07-25 DIAGNOSIS — Z79.02 ANTIPLATELET OR ANTITHROMBOTIC LONG-TERM USE: Chronic | ICD-10-CM

## 2022-07-25 RX ORDER — FUROSEMIDE 20 MG/1
TABLET ORAL
Refills: 0 | OUTPATIENT
Start: 2022-07-25

## 2022-07-25 RX ORDER — EMPAGLIFLOZIN 25 MG/1
TABLET, FILM COATED ORAL
Refills: 0 | OUTPATIENT
Start: 2022-07-25

## 2022-07-25 NOTE — TELEPHONE ENCOUNTER
Refill Decision Note   Saumya Braun  is requesting a refill authorization.  Brief Assessment and Rationale for Refill:  Quick Discontinue     Medication Therapy Plan:    Pharmacy is requesting new scripts for the following medications without required information, (sig/ frequency/qty/etc)      Medication Reconciliation Completed: No     Comments: Pharmacies have been requesting medications for patients without required information, (sig, frequency, qty, etc.). In addition, requests are sent for medication(s) pt. are currently not taking, and medications patients have never taken.    We have spoken to the pharmacies about these request types and advised their teams previously that we are unable to assess these New Script requests and require all details for these requests. This is a known issue and has been reported.     Note composed:12:08 PM 07/25/2022

## 2022-07-25 NOTE — TELEPHONE ENCOUNTER
Refill Decision Note   Saumya Braun  is requesting a refill authorization.  Brief Assessment and Rationale for Refill:  Quick Discontinue     Medication Therapy Plan:    Pharmacy is requesting new scripts for the following medications without required information, (sig/ frequency/qty/etc)      Medication Reconciliation Completed: No     Comments: Pharmacies have been requesting medications for patients without required information, (sig, frequency, qty, etc.). In addition, requests are sent for medication(s) pt. are currently not taking, and medications patients have never taken.    We have spoken to the pharmacies about these request types and advised their teams previously that we are unable to assess these New Script requests and require all details for these requests. This is a known issue and has been reported.     Note composed:12:07 PM 07/25/2022

## 2022-07-25 NOTE — TELEPHONE ENCOUNTER
No new care gaps identified.  [facility] Embedded Care Gaps. Reference number: [id number]. 7/25/2022   10:58:22 AM ISABELT

## 2022-07-25 NOTE — TELEPHONE ENCOUNTER
No new care gaps identified.  [facility] Embedded Care Gaps. Reference number: [id number]. 7/25/2022   10:57:47 AM ISABELT

## 2022-07-26 ENCOUNTER — TELEPHONE (OUTPATIENT)
Dept: DERMATOLOGY | Facility: CLINIC | Age: 77
End: 2022-07-26
Payer: MEDICARE

## 2022-07-26 NOTE — TELEPHONE ENCOUNTER
----- Message from Elsa Bennett sent at 7/26/2022 11:44 AM CDT -----  Contact: Pt  Type:  Appointment Request    Name of Caller:  Pt  When is the first available appointment?  N/A  Symptoms:  Rash, hives on leg & arm  Best Call Back Number:  [phone]  Additional Information:  Please call back.  Thanks.

## 2022-08-24 DIAGNOSIS — E11.65 TYPE 2 DIABETES MELLITUS WITH HYPERGLYCEMIA, WITHOUT LONG-TERM CURRENT USE OF INSULIN: Chronic | ICD-10-CM

## 2022-08-24 RX ORDER — EMPAGLIFLOZIN 25 MG/1
25 TABLET, FILM COATED ORAL DAILY
Qty: 90 TABLET | Refills: 4 | Status: SHIPPED | OUTPATIENT
Start: 2022-08-24 | End: 2023-08-29

## 2022-08-24 NOTE — TELEPHONE ENCOUNTER
No new care gaps identified.  [facility] Embedded Care Gaps. Reference number: [id number]. 8/24/2022   9:11:44 AM ISABELT

## 2022-08-24 NOTE — TELEPHONE ENCOUNTER
----- Message from Edie Crockett sent at 8/24/2022  8:42 AM CDT -----  Contact: Saumya  .Type:  RX Refill Request    Who Called: Saumya  Refill or New Rx:Refill  RX Name and Strength:JARDIANCE 25 mg tablet   How is the patient currently taking it? (ex. 1XDay):as prescribed   Is this a 30 day or 90 day RX:30  Preferred Pharmacy with phone number:.  [facility] [address] [address]  [address] [facility] [address]  Phone: [phone] Fax: [phone]  Local or Mail Order:Local  Ordering Provider: Dr Jean  Would the patient rather a call back or a response via [facility]? Call   Best Call Back Number:.[phone]   Additional Information: please give call back to notify  Thanks   ELISA/DANIEL

## 2022-08-30 DIAGNOSIS — Z13.21 ENCOUNTER FOR VITAMIN DEFICIENCY SCREENING: ICD-10-CM

## 2022-08-30 DIAGNOSIS — E11.9 TYPE 2 DIABETES MELLITUS WITHOUT COMPLICATION, WITHOUT LONG-TERM CURRENT USE OF INSULIN: Chronic | ICD-10-CM

## 2022-08-30 DIAGNOSIS — Z79.82 ASPIRIN LONG-TERM USE: Chronic | ICD-10-CM

## 2022-08-30 DIAGNOSIS — Z13.89 SCREENING FOR HEMATURIA OR PROTEINURIA: ICD-10-CM

## 2022-08-30 DIAGNOSIS — Z13.29 SCREENING FOR THYROID DISORDER: ICD-10-CM

## 2022-08-30 DIAGNOSIS — I50.22 CHRONIC SYSTOLIC HEART FAILURE: Chronic | ICD-10-CM

## 2022-08-30 DIAGNOSIS — Z79.02 ANTIPLATELET OR ANTITHROMBOTIC LONG-TERM USE: Chronic | ICD-10-CM

## 2022-08-30 DIAGNOSIS — I25.10 CORONARY ARTERY DISEASE INVOLVING NATIVE CORONARY ARTERY OF NATIVE HEART WITHOUT ANGINA PECTORIS: Chronic | ICD-10-CM

## 2022-08-30 DIAGNOSIS — Z95.1 STATUS POST AORTO-CORONARY ARTERY BYPASS GRAFT: Chronic | ICD-10-CM

## 2022-08-30 DIAGNOSIS — Z13.0 SCREENING FOR DEFICIENCY ANEMIA: ICD-10-CM

## 2022-08-30 DIAGNOSIS — R91.8 MULTIPLE PULMONARY NODULES: Chronic | ICD-10-CM

## 2022-08-30 DIAGNOSIS — Z79.899 LONG TERM CURRENT USE OF DIURETIC: Chronic | ICD-10-CM

## 2022-08-30 DIAGNOSIS — I15.2 HYPERTENSION ASSOCIATED WITH DIABETES: ICD-10-CM

## 2022-08-30 DIAGNOSIS — E78.5 HYPERLIPIDEMIA ASSOCIATED WITH TYPE 2 DIABETES MELLITUS: Chronic | ICD-10-CM

## 2022-08-30 DIAGNOSIS — E11.69 HYPERLIPIDEMIA ASSOCIATED WITH TYPE 2 DIABETES MELLITUS: Chronic | ICD-10-CM

## 2022-08-30 DIAGNOSIS — R79.89 LOW VITAMIN B12 LEVEL: ICD-10-CM

## 2022-08-30 DIAGNOSIS — I25.5 ISCHEMIC CARDIOMYOPATHY: Chronic | ICD-10-CM

## 2022-08-30 DIAGNOSIS — E11.59 HYPERTENSION ASSOCIATED WITH DIABETES: ICD-10-CM

## 2022-08-30 RX ORDER — FUROSEMIDE 20 MG/1
20 TABLET ORAL DAILY
Qty: 30 TABLET | Refills: 0 | Status: SHIPPED | OUTPATIENT
Start: 2022-08-30 | End: 2022-09-29

## 2022-08-30 NOTE — TELEPHONE ENCOUNTER
----- Message from Isacalyssa Eloisa sent at 2022 10:01 AM CDT -----  Contact: [phone]  .Type:  RX Refill Request    Who Called: Saumya   Refill or New Rx:refill   RX Name and Strength:furosemide (LASIX) 20 MG tablet (  How is the patient currently taking it? (ex. 1XDay): 1x a day   Is this a 30 day or 90 day RX:90   Preferred Pharmacy with phone number:  [facility] [address] [address]  [address] [facility] [address]  Phone: [phone] Fax: [phone]  Local or Mail Order:local    Ordering Provider:   Would the patient rather a call back or a response via [facility]? Call back   Best Call Back Number:[phone]  Additional Information:

## 2022-08-30 NOTE — TELEPHONE ENCOUNTER
No new care gaps identified.  [facility] Embedded Care Gaps. Reference number: [id number]. 8/30/2022   10:04:10 AM CDT

## 2022-09-08 NOTE — TELEPHONE ENCOUNTER
ANTICOAGULATION MANAGEMENT     Bobby Maki 58 year old male is on warfarin with subtherapeutic INR result. (Goal INR 2.0-3.0)    Recent labs: (last 7 days)     09/08/22  1400   INR 1.6*       ASSESSMENT       Source(s): Chart Review and Patient/Caregiver Call       Warfarin doses taken: Warfarin taken as instructed    Diet: No new diet changes identified    New illness, injury, or hospitalization: No    Medication/supplement changes: None noted    Signs or symptoms of bleeding or clotting: No    Previous INR: Therapeutic last 2(+) visits    Additional findings: None       PLAN     Recommended plan for no diet, medication or health factor changes affecting INR     Dosing Instructions: booster dose then Increase your warfarin dose (10% change) with next INR in 2 weeks       Summary  As of 9/8/2022    Full warfarin instructions:  9/8: 3 mg; Otherwise 1 mg every Mon, Wed, Fri; 2 mg all other days   Next INR check:  9/22/2022             Telephone call with Brandon who verbalizes understanding and agrees to plan    Lab visit scheduled    Education provided: None required    Plan made per [facility] anticoagulation protocol    Netta Antunez RN  Anticoagulation Clinic  9/8/2022    _______________________________________________________________________     Anticoagulation Episode Summary     Current INR goal:  2.0-3.0   TTR:  82.4 % (1 y)   Target end date:  Indefinite   Send INR reminders to:  MIKAEL MIDWAY    Indications    Patent Foramen Ovale [Q21.1]           Comments:           Anticoagulation Care Providers     Provider Role Specialty Phone number    Tano Alexis MD Referring Internal Medicine [phone]           Spoke with patient and refill request sent to Dr Santillan.

## 2022-11-16 RX ORDER — ALBUTEROL SULFATE 90 UG/1
2 AEROSOL, METERED RESPIRATORY (INHALATION) EVERY 6 HOURS PRN
Qty: 18 G | Refills: 0 | Status: SHIPPED | OUTPATIENT
Start: 2022-11-16

## 2022-11-16 NOTE — TELEPHONE ENCOUNTER
Care Due:                  Date            Visit Type   Department     Provider  --------------------------------------------------------------------------------                                EP -                              PRIMARY      [facility] FAMILY  Last Visit: 07-      CARE ([facility])   MEDICINE       Pablito Jean  Next Visit: None Scheduled  None         None Found                                                            Last  Test          Frequency    Reason                     Performed    Due Date  --------------------------------------------------------------------------------    HBA1C.......  6 months...  empagliflozin............  07-   01-    [facility] Embedded Care Gaps. Reference number: [id number]. 11/16/2022   11:28:16 AM CST

## 2022-11-16 NOTE — TELEPHONE ENCOUNTER
----- Message from Antonia Rogers sent at 11/16/2022  9:39 AM CST -----  Contact: self/[phone]  Patient is calling to consult with nurse regarding a refill on her albuterol inhaler, she would like it sent to   [facility] [place] - [address]  [address] [address]  [place] [address]  Phone: [phone] Fax: [phone]  Please call her back at [phone]. Thanks/ar

## 2022-11-22 ENCOUNTER — IMMUNIZATION (OUTPATIENT)
Dept: FAMILY MEDICINE | Facility: CLINIC | Age: 77
End: 2022-11-22
Payer: MEDICARE

## 2022-11-22 DIAGNOSIS — Z23 NEED FOR VACCINATION: Primary | ICD-10-CM

## 2022-11-22 PROCEDURE — 0124A COVID-19, MRNA, LNP-S, BIVALENT BOOSTER, PF, 30 MCG/0.3 ML DOSE: CPT | Mod: PBBFAC | Performed by: FAMILY MEDICINE

## 2022-11-22 PROCEDURE — 91312 COVID-19, MRNA, LNP-S, BIVALENT BOOSTER, PF, 30 MCG/0.3 ML DOSE: ICD-10-PCS | Mod: S$GLB,,, | Performed by: FAMILY MEDICINE

## 2022-11-22 PROCEDURE — 91312 COVID-19, MRNA, LNP-S, BIVALENT BOOSTER, PF, 30 MCG/0.3 ML DOSE: CPT | Mod: S$GLB,,, | Performed by: FAMILY MEDICINE

## 2022-12-23 ENCOUNTER — PES CALL (OUTPATIENT)
Dept: ADMINISTRATIVE | Facility: CLINIC | Age: 77
End: 2022-12-23
Payer: MEDICARE

## 2023-01-18 ENCOUNTER — TELEPHONE (OUTPATIENT)
Dept: ADMINISTRATIVE | Facility: CLINIC | Age: 78
End: 2023-01-18
Payer: MEDICARE

## 2023-01-18 NOTE — TELEPHONE ENCOUNTER
Called pt, informed pt I was calling to remind pt of her in office EAWV on 1/20/23; clinic location provided to patient; pt confirmed appointment

## 2023-01-20 ENCOUNTER — OFFICE VISIT (OUTPATIENT)
Dept: FAMILY MEDICINE | Facility: CLINIC | Age: 78
End: 2023-01-20
Payer: MEDICARE

## 2023-01-20 ENCOUNTER — LAB VISIT (OUTPATIENT)
Dept: LAB | Facility: HOSPITAL | Age: 78
End: 2023-01-20
Attending: FAMILY MEDICINE
Payer: MEDICARE

## 2023-01-20 VITALS
BODY MASS INDEX: 23.85 KG/M2 | OXYGEN SATURATION: 98 % | WEIGHT: 126.31 LBS | HEIGHT: 61 IN | RESPIRATION RATE: 20 BRPM | TEMPERATURE: 98 F

## 2023-01-20 DIAGNOSIS — H35.3132 NONEXUDATIVE AGE-RELATED MACULAR DEGENERATION, BILATERAL, INTERMEDIATE DRY STAGE: Chronic | ICD-10-CM

## 2023-01-20 DIAGNOSIS — Z13.220 ENCOUNTER FOR LIPID SCREENING FOR CARDIOVASCULAR DISEASE: ICD-10-CM

## 2023-01-20 DIAGNOSIS — H35.52 RETINITIS PIGMENTOSA, BOTH EYES: Chronic | ICD-10-CM

## 2023-01-20 DIAGNOSIS — Z79.899 ENCOUNTER FOR LONG-TERM (CURRENT) USE OF MEDICATIONS: ICD-10-CM

## 2023-01-20 DIAGNOSIS — E11.65 TYPE 2 DIABETES MELLITUS WITH HYPERGLYCEMIA, WITHOUT LONG-TERM CURRENT USE OF INSULIN: Chronic | ICD-10-CM

## 2023-01-20 DIAGNOSIS — I25.10 CORONARY ARTERY DISEASE INVOLVING NATIVE CORONARY ARTERY OF NATIVE HEART WITHOUT ANGINA PECTORIS: Chronic | ICD-10-CM

## 2023-01-20 DIAGNOSIS — Z79.899 ENCOUNTER FOR LONG-TERM (CURRENT) USE OF MEDICATIONS: Chronic | ICD-10-CM

## 2023-01-20 DIAGNOSIS — E11.59 HYPERTENSION ASSOCIATED WITH DIABETES: Chronic | ICD-10-CM

## 2023-01-20 DIAGNOSIS — I15.2 HYPERTENSION ASSOCIATED WITH DIABETES: Chronic | ICD-10-CM

## 2023-01-20 DIAGNOSIS — R91.8 MULTIPLE PULMONARY NODULES: Chronic | ICD-10-CM

## 2023-01-20 DIAGNOSIS — I50.32 CHRONIC HEART FAILURE WITH PRESERVED EJECTION FRACTION: Chronic | ICD-10-CM

## 2023-01-20 DIAGNOSIS — E11.69 HYPERLIPIDEMIA ASSOCIATED WITH TYPE 2 DIABETES MELLITUS: Chronic | ICD-10-CM

## 2023-01-20 DIAGNOSIS — E78.5 HYPERLIPIDEMIA ASSOCIATED WITH TYPE 2 DIABETES MELLITUS: Chronic | ICD-10-CM

## 2023-01-20 DIAGNOSIS — Z13.6 ENCOUNTER FOR LIPID SCREENING FOR CARDIOVASCULAR DISEASE: ICD-10-CM

## 2023-01-20 DIAGNOSIS — Z00.00 WELL ADULT EXAM: ICD-10-CM

## 2023-01-20 DIAGNOSIS — I70.0 ATHEROSCLEROSIS OF AORTA: ICD-10-CM

## 2023-01-20 DIAGNOSIS — Z00.00 ENCOUNTER FOR PREVENTIVE CARE: Primary | ICD-10-CM

## 2023-01-20 LAB
CHOLEST SERPL-MCNC: 116 MG/DL (ref 120–199)
CHOLEST/HDLC SERPL: 3.1 {RATIO} (ref 2–5)
ESTIMATED AVG GLUCOSE: 166 MG/DL (ref 68–131)
HBA1C MFR BLD: 7.4 % (ref 4–5.6)
HDLC SERPL-MCNC: 38 MG/DL (ref 40–75)
HDLC SERPL: 32.8 % (ref 20–50)
LDLC SERPL CALC-MCNC: 33 MG/DL (ref 63–159)
NONHDLC SERPL-MCNC: 78 MG/DL
TRIGL SERPL-MCNC: 225 MG/DL (ref 30–150)

## 2023-01-20 PROCEDURE — G0439 PR MEDICARE ANNUAL WELLNESS SUBSEQUENT VISIT: ICD-10-PCS | Mod: HCNC,S$GLB,, | Performed by: NURSE PRACTITIONER

## 2023-01-20 PROCEDURE — 82570 ASSAY OF URINE CREATININE: CPT | Mod: HCNC | Performed by: NURSE PRACTITIONER

## 2023-01-20 PROCEDURE — G9920 PR SCREENING AND NEGATIVE: ICD-10-PCS | Mod: HCNC,CPTII,S$GLB, | Performed by: NURSE PRACTITIONER

## 2023-01-20 PROCEDURE — 1126F PR PAIN SEVERITY QUANTIFIED, NO PAIN PRESENT: ICD-10-PCS | Mod: HCNC,CPTII,S$GLB, | Performed by: NURSE PRACTITIONER

## 2023-01-20 PROCEDURE — 99999 PR PBB SHADOW E&M-EST. PATIENT-LVL IV: CPT | Mod: PBBFAC,HCNC,, | Performed by: NURSE PRACTITIONER

## 2023-01-20 PROCEDURE — 99999 PR PBB SHADOW E&M-EST. PATIENT-LVL IV: ICD-10-PCS | Mod: PBBFAC,HCNC,, | Performed by: NURSE PRACTITIONER

## 2023-01-20 PROCEDURE — 1160F PR REVIEW ALL MEDS BY PRESCRIBER/CLIN PHARMACIST DOCUMENTED: ICD-10-PCS | Mod: HCNC,CPTII,S$GLB, | Performed by: NURSE PRACTITIONER

## 2023-01-20 PROCEDURE — 1159F PR MEDICATION LIST DOCUMENTED IN MEDICAL RECORD: ICD-10-PCS | Mod: HCNC,CPTII,S$GLB, | Performed by: NURSE PRACTITIONER

## 2023-01-20 PROCEDURE — 36415 COLL VENOUS BLD VENIPUNCTURE: CPT | Mod: HCNC,PO | Performed by: FAMILY MEDICINE

## 2023-01-20 PROCEDURE — 83036 HEMOGLOBIN GLYCOSYLATED A1C: CPT | Mod: HCNC | Performed by: FAMILY MEDICINE

## 2023-01-20 PROCEDURE — 80061 LIPID PANEL: CPT | Mod: HCNC | Performed by: FAMILY MEDICINE

## 2023-01-20 PROCEDURE — G9920 SCRNING PERF AND NEGATIVE: HCPCS | Mod: HCNC,CPTII,S$GLB, | Performed by: NURSE PRACTITIONER

## 2023-01-20 PROCEDURE — 1101F PT FALLS ASSESS-DOCD LE1/YR: CPT | Mod: HCNC,CPTII,S$GLB, | Performed by: NURSE PRACTITIONER

## 2023-01-20 PROCEDURE — 1170F FXNL STATUS ASSESSED: CPT | Mod: HCNC,CPTII,S$GLB, | Performed by: NURSE PRACTITIONER

## 2023-01-20 PROCEDURE — 3288F PR FALLS RISK ASSESSMENT DOCUMENTED: ICD-10-PCS | Mod: HCNC,CPTII,S$GLB, | Performed by: NURSE PRACTITIONER

## 2023-01-20 PROCEDURE — 1101F PR PT FALLS ASSESS DOC 0-1 FALLS W/OUT INJ PAST YR: ICD-10-PCS | Mod: HCNC,CPTII,S$GLB, | Performed by: NURSE PRACTITIONER

## 2023-01-20 PROCEDURE — 1160F RVW MEDS BY RX/DR IN RCRD: CPT | Mod: HCNC,CPTII,S$GLB, | Performed by: NURSE PRACTITIONER

## 2023-01-20 PROCEDURE — G0439 PPPS, SUBSEQ VISIT: HCPCS | Mod: HCNC,S$GLB,, | Performed by: NURSE PRACTITIONER

## 2023-01-20 PROCEDURE — 1126F AMNT PAIN NOTED NONE PRSNT: CPT | Mod: HCNC,CPTII,S$GLB, | Performed by: NURSE PRACTITIONER

## 2023-01-20 PROCEDURE — 1159F MED LIST DOCD IN RCRD: CPT | Mod: HCNC,CPTII,S$GLB, | Performed by: NURSE PRACTITIONER

## 2023-01-20 PROCEDURE — 3288F FALL RISK ASSESSMENT DOCD: CPT | Mod: HCNC,CPTII,S$GLB, | Performed by: NURSE PRACTITIONER

## 2023-01-20 PROCEDURE — 1170F PR FUNCTIONAL STATUS ASSESSED: ICD-10-PCS | Mod: HCNC,CPTII,S$GLB, | Performed by: NURSE PRACTITIONER

## 2023-01-20 RX ORDER — INFLUENZA A VIRUS A/VICTORIA/2570/2019 IVR-215 (H1N1) ANTIGEN (FORMALDEHYDE INACTIVATED), INFLUENZA A VIRUS A/DARWIN/6/2021 IVR-227 (H3N2) ANTIGEN (FORMALDEHYDE INACTIVATED), INFLUENZA B VIRUS B/AUSTRIA/1359417/2021 BVR-26 ANTIGEN (FORMALDEHYDE INACTIVATED), INFLUENZA B VIRUS B/PHUKET/3073/2013 BVR-1B ANTIGEN (FORMALDEHYDE INACTIVATED) 15; 15; 15; 15 UG/.5ML; UG/.5ML; UG/.5ML; UG/.5ML
INJECTION, SUSPENSION INTRAMUSCULAR
COMMUNITY
Start: 2022-09-29 | End: 2023-07-11

## 2023-01-20 RX ORDER — EVOLOCUMAB 140 MG/ML
INJECTION, SOLUTION SUBCUTANEOUS
COMMUNITY
Start: 2023-01-17

## 2023-01-20 NOTE — PROGRESS NOTES
"  Saumya Braun presented for a follow-up Medicare AWV today. The following components were reviewed and updated:    Medical history  Family History  Social history  Allergies and Current Medications  Health Risk Assessment  Health Maintenance  Care Team    **See Completed Assessments for Annual Wellness visit with in the encounter summary    The following assessments were completed:  Depression Screening  Cognitive function Screening  Timed Get Up Test  Whisper Test  PHQ-2  Nutrition screen  ADL  PAQ  Osteoporosis risk  CAGE  Living situation  Vitals:    01/20/23 1147   Resp: 20   Temp: 97.7 °F (36.5 °C)   SpO2: 98%   Weight: 57.3 kg (126 lb 5.2 oz)   Height: 5' 1" (1.549 m)     Body mass index is 23.87 kg/m².   ]        Diagnoses and health risks identified today and associated recommendations/orders:  1. Encounter for preventive care  Stable  Up to date    2. Type 2 diabetes mellitus with hyperglycemia, without long-term current use of insulin  Stable  Managed by PCP  Continue current medications, plan of care  F/U with PCP as advised  - Microalbumin/creatinine urine ratio    3. Hypertension associated with diabetes  Stable  Managed by PCP, cardiology  Low sodium diet recommended  Continue current medications, plan of care  F/U with PCP, specialist as advised    4. Hyperlipidemia associated with type 2 diabetes mellitus  Stable  Managed by PCP, cardiology  Low cholesterol diet recommended  Continue current medications, plan of care  F/U with PCP, specialist as advised    5. Chronic heart failure with preserved ejection fraction  Stable  Managed by cardiology  Continue current medications, plan of care  F/U with specialist as advised    6. Atherosclerosis of aorta  Stable  Managed by cardiology  Continue current medications, plan of care  F/U with specialist as advised    7. Multiple pulmonary nodules  Stable  Managed by PCP  Consider pulmonology if worsening  Continue current medications, plan of care  F/U with " PCP as advised    8. Nonexudative age-related macular degeneration, bilateral, intermediate dry stage  Stable  Managed by ophthalmology  Continue current medications, plan of care  F/U with specialist as advised    9. Retinitis pigmentosa, both eyes  Stable  Managed by ophthalmology  Continue current medications, plan of care  F/U with specialist as advised    10. Coronary artery disease involving native coronary artery of native heart without angina pectoris  Stable  Managed by cardiology  Continue current medications, plan of care  F/U with specialist as advised    11. Encounter for long-term (current) use of medications  Stable  Continue current plan of care    No current opioid use  I offered to discuss end of life issues, including information on how to make advance directives that the patient could use to name someone who would make medical decisions on their behalf if they became too ill to make themselves.    ___Patient declined - already done.    _x__Patient is interested, I provided paperwork and offered to discuss  Provided Saumya with a 5-10 year written screening schedule and personal prevention plan. Recommendations were developed using the USPSTF age appropriate recommendations. Education, counseling, and referrals were provided as needed.  After Visit Summary printed and given to patient which includes a list of additional screenings\tests needed.    No follow-ups on file.      Zelda Tena NP

## 2023-01-21 LAB
ALBUMIN/CREAT UR: ABNORMAL UG/MG (ref 0–30)
CREAT UR-MCNC: 9 MG/DL (ref 15–325)
MICROALBUMIN UR DL<=1MG/L-MCNC: <5 UG/ML

## 2023-01-30 NOTE — PROGRESS NOTES
Make follow-up lab appointment per recommendation below.  Check to see if patient has seen the results through my chart.  If not then,  #CALL THE PATIENT# to discuss results/see if they have questions and document verification of contact. Make F/U appt if needed. [phone]    #My interpretation that was sent to them through [facility]:  Saumya, I have reviewed your recent blood work.     Your cholesterol is stable.  Total cholesterol has improved.  Triglycerides have improved but still elevated.  Consider increasing or adding fish oil to year supplements.  Continue Repatha.  Your hemoglobin A1c is elevated from previous I recommend a low-carbohydrate diet, increase in exercise and increase hydration with water.  Recheck A1c in three months.  If no improvement may need to add additional medication to your diabetes regimen..  This test is gold standard screening test for diabetes.  It is a measures 3 months of your average blood sugar.    Check all labs including metabolic panel prior to next appointment.  =========================  Also please address any outstanding health maintenance that may be due: DEXA Scan due on 02/13/2022

## 2023-01-31 ENCOUNTER — TELEPHONE (OUTPATIENT)
Dept: FAMILY MEDICINE | Facility: CLINIC | Age: 78
End: 2023-01-31
Payer: MEDICARE

## 2023-01-31 DIAGNOSIS — E78.5 HYPERLIPIDEMIA ASSOCIATED WITH TYPE 2 DIABETES MELLITUS: ICD-10-CM

## 2023-01-31 DIAGNOSIS — E11.69 HYPERLIPIDEMIA ASSOCIATED WITH TYPE 2 DIABETES MELLITUS: ICD-10-CM

## 2023-01-31 DIAGNOSIS — I25.10 CORONARY ARTERY DISEASE INVOLVING NATIVE CORONARY ARTERY OF NATIVE HEART WITHOUT ANGINA PECTORIS: Primary | ICD-10-CM

## 2023-01-31 RX ORDER — ICOSAPENT ETHYL 1000 MG/1
2 CAPSULE ORAL 2 TIMES DAILY
Qty: 120 CAPSULE | Refills: 3 | Status: SHIPPED | OUTPATIENT
Start: 2023-01-31 | End: 2023-07-11 | Stop reason: SINTOL

## 2023-01-31 NOTE — TELEPHONE ENCOUNTER
----- Message from Rola Klein sent at 1/31/2023 10:54 AM CST -----  Contact: Saumya Kerns is needing a call regarding her needing a prescription for the Fish Oil that Dr. Jean wants her to start taking and for Multi-collagen complex. Please call her back at [phone].    Please send it to:   [facility] [place]  [id number]  [address] [facility] [address] [address]  Phone: [phone] Fax: [phone]

## 2023-01-31 NOTE — TELEPHONE ENCOUNTER
I have signed for the following orders AND/OR meds.  Please call the patient and ask the patient to schedule the testing AND/OR inform about any medications that were sent.      No orders of the defined types were placed in this encounter.      Medications Ordered This Encounter   Medications    icosapent ethyL (VASCEPA) 1 gram Cap     Sig: Take 2 capsules (2 g total) by mouth 2 (two) times daily.     Dispense:  120 capsule     Refill:  3

## 2023-02-07 DIAGNOSIS — Z00.00 ENCOUNTER FOR MEDICARE ANNUAL WELLNESS EXAM: ICD-10-CM

## 2023-02-09 DIAGNOSIS — Z00.00 ENCOUNTER FOR MEDICARE ANNUAL WELLNESS EXAM: ICD-10-CM

## 2023-03-08 DIAGNOSIS — E78.5 HYPERLIPIDEMIA, UNSPECIFIED HYPERLIPIDEMIA TYPE: ICD-10-CM

## 2023-03-08 NOTE — TELEPHONE ENCOUNTER
----- Message from Martina Cooper sent at 3/8/2023  8:47 AM CST -----  Contact: Saumya  .Type:  RX Refill Request    Who Called: Saumya   Refill or New Rx:refill   RX Name and Strength:blood sugar diagnostic Strp  How is the patient currently taking it? (ex. 1XDay):as prescribed   Is this a 30 day or 90 day RX:90 days   Preferred Pharmacy with phone number:.  [facility] [address]  [address] [address] [address] [address] [facility] [address]  Phone: [phone] Fax: [phone]  Local or Mail Order:local   Ordering Provider:Dr. Jean   Would the patient rather a call back or a response via [facility]? Call   Best Call Back Number:.[phone]   Additional Information: Patient requesting medication.

## 2023-03-08 NOTE — TELEPHONE ENCOUNTER
No new care gaps identified.  [facility] Embedded Care Gaps. Reference number: [id number]. 3/08/2023   9:00:46 AM CST

## 2023-03-09 DIAGNOSIS — E78.5 HYPERLIPIDEMIA, UNSPECIFIED HYPERLIPIDEMIA TYPE: ICD-10-CM

## 2023-03-09 NOTE — TELEPHONE ENCOUNTER
No new care gaps identified.  [facility] Embedded Care Gaps. Reference number: [id number]. 3/09/2023   10:58:25 AM CST

## 2023-03-09 NOTE — TELEPHONE ENCOUNTER
----- Message from Lisandra Goodrich sent at 3/9/2023 10:41 AM CST -----  Contact: [phone]  Patient requesting a call back in regards to an update on a refill for test strips. Please call back at [phone]. Thanks KD

## 2023-06-12 ENCOUNTER — OFFICE VISIT (OUTPATIENT)
Dept: OPHTHALMOLOGY | Facility: CLINIC | Age: 78
End: 2023-06-12
Payer: MEDICARE

## 2023-06-12 DIAGNOSIS — H35.3132 NONEXUDATIVE AGE-RELATED MACULAR DEGENERATION, BILATERAL, INTERMEDIATE DRY STAGE: Primary | Chronic | ICD-10-CM

## 2023-06-12 DIAGNOSIS — H35.52 RETINITIS PIGMENTOSA, BOTH EYES: Chronic | ICD-10-CM

## 2023-06-12 PROCEDURE — 3288F PR FALLS RISK ASSESSMENT DOCUMENTED: ICD-10-PCS | Mod: CPTII,S$GLB,, | Performed by: OPHTHALMOLOGY

## 2023-06-12 PROCEDURE — 1126F PR PAIN SEVERITY QUANTIFIED, NO PAIN PRESENT: ICD-10-PCS | Mod: CPTII,S$GLB,, | Performed by: OPHTHALMOLOGY

## 2023-06-12 PROCEDURE — 92014 COMPRE OPH EXAM EST PT 1/>: CPT | Mod: S$GLB,,, | Performed by: OPHTHALMOLOGY

## 2023-06-12 PROCEDURE — 1159F PR MEDICATION LIST DOCUMENTED IN MEDICAL RECORD: ICD-10-PCS | Mod: CPTII,S$GLB,, | Performed by: OPHTHALMOLOGY

## 2023-06-12 PROCEDURE — 3288F FALL RISK ASSESSMENT DOCD: CPT | Mod: CPTII,S$GLB,, | Performed by: OPHTHALMOLOGY

## 2023-06-12 PROCEDURE — 1101F PR PT FALLS ASSESS DOC 0-1 FALLS W/OUT INJ PAST YR: ICD-10-PCS | Mod: CPTII,S$GLB,, | Performed by: OPHTHALMOLOGY

## 2023-06-12 PROCEDURE — 92134 POSTERIOR SEGMENT OCT RETINA (OCULAR COHERENCE TOMOGRAPHY)-BOTH EYES: ICD-10-PCS | Mod: S$GLB,,, | Performed by: OPHTHALMOLOGY

## 2023-06-12 PROCEDURE — 99999 PR PBB SHADOW E&M-EST. PATIENT-LVL III: ICD-10-PCS | Mod: PBBFAC,,, | Performed by: OPHTHALMOLOGY

## 2023-06-12 PROCEDURE — 1126F AMNT PAIN NOTED NONE PRSNT: CPT | Mod: CPTII,S$GLB,, | Performed by: OPHTHALMOLOGY

## 2023-06-12 PROCEDURE — 92014 PR EYE EXAM, EST PATIENT,COMPREHESV: ICD-10-PCS | Mod: S$GLB,,, | Performed by: OPHTHALMOLOGY

## 2023-06-12 PROCEDURE — 92201 PR OPHTHALMOSCOPY, EXT, W/RET DRAW/SCLERAL DEPR, I&R, UNI/BI: ICD-10-PCS | Mod: S$GLB,,, | Performed by: OPHTHALMOLOGY

## 2023-06-12 PROCEDURE — 1159F MED LIST DOCD IN RCRD: CPT | Mod: CPTII,S$GLB,, | Performed by: OPHTHALMOLOGY

## 2023-06-12 PROCEDURE — 92201 OPSCPY EXTND RTA DRAW UNI/BI: CPT | Mod: S$GLB,,, | Performed by: OPHTHALMOLOGY

## 2023-06-12 PROCEDURE — 99999 PR PBB SHADOW E&M-EST. PATIENT-LVL III: CPT | Mod: PBBFAC,,, | Performed by: OPHTHALMOLOGY

## 2023-06-12 PROCEDURE — 92134 CPTRZ OPH DX IMG PST SGM RTA: CPT | Mod: S$GLB,,, | Performed by: OPHTHALMOLOGY

## 2023-06-12 PROCEDURE — 1101F PT FALLS ASSESS-DOCD LE1/YR: CPT | Mod: CPTII,S$GLB,, | Performed by: OPHTHALMOLOGY

## 2023-06-12 RX ORDER — ACETAMINOPHEN 325 MG/1
650 TABLET ORAL
COMMUNITY
Start: 2021-12-31 | End: 2033-05-29

## 2023-06-13 NOTE — PROGRESS NOTES
HPI    Patient here today for yearly AMD check. VA stable ou, no pain and no f/f.    AT ou prn  Last edited by Lisa Yoo on 6/12/2023 10:32 AM.            OCT - fovea sparing macular atrophy  Some paracentral involvement OS      A/P    1. 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 Dry AMD component  Continue AREDS/AG    3. PCIOL OU  Some PCO OD>OS  Pt ok without glare  Will follow      1 year OCT    Letter to Dr. Soni

## 2023-06-26 ENCOUNTER — TELEPHONE (OUTPATIENT)
Dept: FAMILY MEDICINE | Facility: CLINIC | Age: 78
End: 2023-06-26
Payer: MEDICARE

## 2023-06-26 NOTE — TELEPHONE ENCOUNTER
Pt is due for a Hga1c that was not repeated in April. Do you want anything else checked before her July appt?

## 2023-06-26 NOTE — TELEPHONE ENCOUNTER
----- Message from Jami Loving sent at 6/26/2023  9:16 AM CDT -----  Pt is requesting lab orders be put in before her appt. Call back at [phone]

## 2023-06-29 ENCOUNTER — TELEPHONE (OUTPATIENT)
Dept: FAMILY MEDICINE | Facility: CLINIC | Age: 78
End: 2023-06-29
Payer: MEDICARE

## 2023-07-06 ENCOUNTER — LAB VISIT (OUTPATIENT)
Dept: LAB | Facility: HOSPITAL | Age: 78
End: 2023-07-06
Attending: FAMILY MEDICINE
Payer: MEDICARE

## 2023-07-06 DIAGNOSIS — Z79.899 ENCOUNTER FOR LONG-TERM (CURRENT) USE OF MEDICATIONS: ICD-10-CM

## 2023-07-06 DIAGNOSIS — Z13.6 ENCOUNTER FOR LIPID SCREENING FOR CARDIOVASCULAR DISEASE: ICD-10-CM

## 2023-07-06 DIAGNOSIS — Z00.00 WELL ADULT EXAM: ICD-10-CM

## 2023-07-06 DIAGNOSIS — Z13.220 ENCOUNTER FOR LIPID SCREENING FOR CARDIOVASCULAR DISEASE: ICD-10-CM

## 2023-07-06 LAB
ALBUMIN SERPL BCP-MCNC: 4 G/DL (ref 3.5–5.2)
ALP SERPL-CCNC: 54 U/L (ref 55–135)
ALT SERPL W/O P-5'-P-CCNC: 33 U/L (ref 10–44)
ANION GAP SERPL CALC-SCNC: 16 MMOL/L (ref 8–16)
AST SERPL-CCNC: 26 U/L (ref 10–40)
BILIRUB SERPL-MCNC: 0.5 MG/DL (ref 0.1–1)
BUN SERPL-MCNC: 16 MG/DL (ref 8–23)
CALCIUM SERPL-MCNC: 9.8 MG/DL (ref 8.7–10.5)
CHLORIDE SERPL-SCNC: 104 MMOL/L (ref 95–110)
CHOLEST SERPL-MCNC: 131 MG/DL (ref 120–199)
CHOLEST/HDLC SERPL: 3.3 {RATIO} (ref 2–5)
CO2 SERPL-SCNC: 24 MMOL/L (ref 23–29)
CREAT SERPL-MCNC: 0.8 MG/DL (ref 0.5–1.4)
ERYTHROCYTE [DISTWIDTH] IN BLOOD BY AUTOMATED COUNT: 13.6 % (ref 11.5–14.5)
EST. GFR  (NO RACE VARIABLE): >60 ML/MIN/1.73 M^2
ESTIMATED AVG GLUCOSE: 166 MG/DL (ref 68–131)
GLUCOSE SERPL-MCNC: 133 MG/DL (ref 70–110)
HBA1C MFR BLD: 7.4 % (ref 4–5.6)
HCT VFR BLD AUTO: 44.3 % (ref 37–48.5)
HDLC SERPL-MCNC: 40 MG/DL (ref 40–75)
HDLC SERPL: 30.5 % (ref 20–50)
HGB BLD-MCNC: 14 G/DL (ref 12–16)
LDLC SERPL CALC-MCNC: 26.8 MG/DL (ref 63–159)
MCH RBC QN AUTO: 31.5 PG (ref 27–31)
MCHC RBC AUTO-ENTMCNC: 31.6 G/DL (ref 32–36)
MCV RBC AUTO: 100 FL (ref 82–98)
NONHDLC SERPL-MCNC: 91 MG/DL
PLATELET # BLD AUTO: 223 K/UL (ref 150–450)
PMV BLD AUTO: 10.4 FL (ref 9.2–12.9)
POTASSIUM SERPL-SCNC: 3.8 MMOL/L (ref 3.5–5.1)
PROT SERPL-MCNC: 7.1 G/DL (ref 6–8.4)
RBC # BLD AUTO: 4.44 M/UL (ref 4–5.4)
SODIUM SERPL-SCNC: 144 MMOL/L (ref 136–145)
TRIGL SERPL-MCNC: 321 MG/DL (ref 30–150)
TSH SERPL DL<=0.005 MIU/L-ACNC: 3.16 UIU/ML (ref 0.4–4)
WBC # BLD AUTO: 7.25 K/UL (ref 3.9–12.7)

## 2023-07-06 PROCEDURE — 80061 LIPID PANEL: CPT | Mod: HCNC | Performed by: FAMILY MEDICINE

## 2023-07-06 PROCEDURE — 83036 HEMOGLOBIN GLYCOSYLATED A1C: CPT | Mod: HCNC | Performed by: FAMILY MEDICINE

## 2023-07-06 PROCEDURE — 80053 COMPREHEN METABOLIC PANEL: CPT | Mod: HCNC | Performed by: FAMILY MEDICINE

## 2023-07-06 PROCEDURE — 36415 COLL VENOUS BLD VENIPUNCTURE: CPT | Mod: HCNC,PO | Performed by: FAMILY MEDICINE

## 2023-07-06 PROCEDURE — 85027 COMPLETE CBC AUTOMATED: CPT | Mod: HCNC | Performed by: FAMILY MEDICINE

## 2023-07-06 PROCEDURE — 84443 ASSAY THYROID STIM HORMONE: CPT | Mod: HCNC | Performed by: FAMILY MEDICINE

## 2023-07-11 ENCOUNTER — OFFICE VISIT (OUTPATIENT)
Dept: FAMILY MEDICINE | Facility: CLINIC | Age: 78
End: 2023-07-11
Payer: MEDICARE

## 2023-07-11 VITALS
OXYGEN SATURATION: 97 % | SYSTOLIC BLOOD PRESSURE: 110 MMHG | HEIGHT: 61 IN | HEART RATE: 68 BPM | WEIGHT: 126.63 LBS | DIASTOLIC BLOOD PRESSURE: 70 MMHG | BODY MASS INDEX: 23.91 KG/M2

## 2023-07-11 DIAGNOSIS — I15.2 HYPERTENSION ASSOCIATED WITH DIABETES: ICD-10-CM

## 2023-07-11 DIAGNOSIS — E11.59 HYPERTENSION ASSOCIATED WITH DIABETES: ICD-10-CM

## 2023-07-11 DIAGNOSIS — E11.65 TYPE 2 DIABETES MELLITUS WITH HYPERGLYCEMIA, WITHOUT LONG-TERM CURRENT USE OF INSULIN: Primary | ICD-10-CM

## 2023-07-11 DIAGNOSIS — E11.69 HYPERLIPIDEMIA ASSOCIATED WITH TYPE 2 DIABETES MELLITUS: ICD-10-CM

## 2023-07-11 DIAGNOSIS — E78.5 HYPERLIPIDEMIA ASSOCIATED WITH TYPE 2 DIABETES MELLITUS: ICD-10-CM

## 2023-07-11 DIAGNOSIS — Z79.899 ENCOUNTER FOR LONG-TERM (CURRENT) USE OF MEDICATIONS: ICD-10-CM

## 2023-07-11 PROCEDURE — 99214 OFFICE O/P EST MOD 30 MIN: CPT | Mod: HCNC,S$GLB,, | Performed by: FAMILY MEDICINE

## 2023-07-11 PROCEDURE — 3288F FALL RISK ASSESSMENT DOCD: CPT | Mod: HCNC,CPTII,S$GLB, | Performed by: FAMILY MEDICINE

## 2023-07-11 PROCEDURE — 3074F SYST BP LT 130 MM HG: CPT | Mod: HCNC,CPTII,S$GLB, | Performed by: FAMILY MEDICINE

## 2023-07-11 PROCEDURE — 1160F PR REVIEW ALL MEDS BY PRESCRIBER/CLIN PHARMACIST DOCUMENTED: ICD-10-PCS | Mod: HCNC,CPTII,S$GLB, | Performed by: FAMILY MEDICINE

## 2023-07-11 PROCEDURE — 3078F DIAST BP <80 MM HG: CPT | Mod: HCNC,CPTII,S$GLB, | Performed by: FAMILY MEDICINE

## 2023-07-11 PROCEDURE — 99214 PR OFFICE/OUTPT VISIT, EST, LEVL IV, 30-39 MIN: ICD-10-PCS | Mod: HCNC,S$GLB,, | Performed by: FAMILY MEDICINE

## 2023-07-11 PROCEDURE — 1159F PR MEDICATION LIST DOCUMENTED IN MEDICAL RECORD: ICD-10-PCS | Mod: HCNC,CPTII,S$GLB, | Performed by: FAMILY MEDICINE

## 2023-07-11 PROCEDURE — 1126F AMNT PAIN NOTED NONE PRSNT: CPT | Mod: HCNC,CPTII,S$GLB, | Performed by: FAMILY MEDICINE

## 2023-07-11 PROCEDURE — 3288F PR FALLS RISK ASSESSMENT DOCUMENTED: ICD-10-PCS | Mod: HCNC,CPTII,S$GLB, | Performed by: FAMILY MEDICINE

## 2023-07-11 PROCEDURE — 1101F PR PT FALLS ASSESS DOC 0-1 FALLS W/OUT INJ PAST YR: ICD-10-PCS | Mod: HCNC,CPTII,S$GLB, | Performed by: FAMILY MEDICINE

## 2023-07-11 PROCEDURE — 99999 PR PBB SHADOW E&M-EST. PATIENT-LVL IV: ICD-10-PCS | Mod: PBBFAC,HCNC,, | Performed by: FAMILY MEDICINE

## 2023-07-11 PROCEDURE — 1159F MED LIST DOCD IN RCRD: CPT | Mod: HCNC,CPTII,S$GLB, | Performed by: FAMILY MEDICINE

## 2023-07-11 PROCEDURE — 99999 PR PBB SHADOW E&M-EST. PATIENT-LVL IV: CPT | Mod: PBBFAC,HCNC,, | Performed by: FAMILY MEDICINE

## 2023-07-11 PROCEDURE — 1101F PT FALLS ASSESS-DOCD LE1/YR: CPT | Mod: HCNC,CPTII,S$GLB, | Performed by: FAMILY MEDICINE

## 2023-07-11 PROCEDURE — 1126F PR PAIN SEVERITY QUANTIFIED, NO PAIN PRESENT: ICD-10-PCS | Mod: HCNC,CPTII,S$GLB, | Performed by: FAMILY MEDICINE

## 2023-07-11 PROCEDURE — 3078F PR MOST RECENT DIASTOLIC BLOOD PRESSURE < 80 MM HG: ICD-10-PCS | Mod: HCNC,CPTII,S$GLB, | Performed by: FAMILY MEDICINE

## 2023-07-11 PROCEDURE — 3074F PR MOST RECENT SYSTOLIC BLOOD PRESSURE < 130 MM HG: ICD-10-PCS | Mod: HCNC,CPTII,S$GLB, | Performed by: FAMILY MEDICINE

## 2023-07-11 PROCEDURE — 1160F RVW MEDS BY RX/DR IN RCRD: CPT | Mod: HCNC,CPTII,S$GLB, | Performed by: FAMILY MEDICINE

## 2023-07-11 NOTE — PATIENT INSTRUCTIONS
Follow up in about 6 months (around 1/11/2024), or if symptoms worsen or fail to improve, for Fasting Labs in 6 months.     Dear patient,   As a result of recent federal legislation (The Federal Cures Act), you may receive lab or pathology results from your visit in your [facility] account before your physician is able to contact you. Your physician or their representative will relay the results to you with their recommendations at their soonest availability.     If no improvement in symptoms or symptoms worsen, please be advised to call MD, follow-up at clinic and/or go to ER if becomes severe.    Pablito Jean M.D.        We Offer TELEHEALTH & Same Day Appointments!   Book your Telehealth appointment with me through my nurse or   Clinic appointments on [facility]!    [address] [place] [address]    Office: [phone]   FAX: [phone]    Check out my Facebook Page [email or web address] Follow Me at: [email or web address]    Check out my website at [email or web address] by clicking on: [email or web address]    To Schedule appointments online, go to [facility]: [email or web address]

## 2023-07-12 NOTE — ASSESSMENT & PLAN NOTE
Continue Repatha.  Follow-up with Cardiology.Counseled on hyperlipidemia disease course, healthy diet and increased need for exercise.  Please be advised of the risk of cardiovascular disease, increase stroke and heart attack risk with uncontrolled/untreated hyperlipidemia.     Patient voiced understanding and understood the treatment plan. All questions were answered.

## 2023-07-12 NOTE — ASSESSMENT & PLAN NOTE
Continue Jardiance.  Patient doing well with controlling A1c less than 8.0.  We will plan to monitor hemoglobin A1c at designated intervals 3 to 6 months.  I recommend ongoing Education for diabetic diet and exercise protocol.  We will continue to monitor for side effects.    Please be advised of symptoms to monitor for and to notify me immediately if persistent or worsening.  Follow up with Ophthalmology/Optometry and Podiatry at least annually.

## 2023-07-12 NOTE — PROGRESS NOTES
PLAN:      Problem List Items Addressed This Visit       Type 2 diabetes mellitus with hyperglycemia, without long-term current use of insulin - Primary (Chronic)     Continue Jardiance.  Patient doing well with controlling A1c less than 8.0.  We will plan to monitor hemoglobin A1c at designated intervals 3 to 6 months.  I recommend ongoing Education for diabetic diet and exercise protocol.  We will continue to monitor for side effects.    Please be advised of symptoms to monitor for and to notify me immediately if persistent or worsening.  Follow up with Ophthalmology/Optometry and Podiatry at least annually.           Hypertension associated with diabetes (Chronic)     Counseled on importance of hypertension disease course, I recommend ongoing Education for DASH-diet and exercise.  Counseled on medication regimen importance of treating high blood pressure.  Please be advised of risk of untreated blood pressure as discussed.  Please advised of ER precautions were given for symptoms of hypertensive urgency and emergency.           Hyperlipidemia associated with type 2 diabetes mellitus (Chronic)     Continue Repatha.  Follow-up with Cardiology.Counseled on hyperlipidemia disease course, healthy diet and increased need for exercise.  Please be advised of the risk of cardiovascular disease, increase stroke and heart attack risk with uncontrolled/untreated hyperlipidemia.     Patient voiced understanding and understood the treatment plan. All questions were answered.              Encounter for long-term (current) use of medications (Chronic)     Complete history and physical was completed today.  Complete and thorough medication reconciliation was performed.  Discussed risks and benefits of medications.  Advised patient on orders and health maintenance.  We discussed old records and old labs if available.  Will request any records not available through epic.  Continue current medications listed on your summary  sheet.            Future Appointments       Date Specialty Appt Notes    1/11/2024 Lab labs           Medication Management for assessment above:   Medication List with Changes/Refills   Current Medications    ACETAMINOPHEN (TYLENOL) 325 MG TABLET    Take 650 mg by mouth.    ALBUTEROL (VENTOLIN HFA) 90 MCG/ACTUATION INHALER    Inhale 2 puffs into the lungs every 6 (six) hours as needed for Wheezing. Rescue    BLOOD SUGAR DIAGNOSTIC STRP    TEST BLOOD SUGAR ONCE DAILY    CLOPIDOGREL (PLAVIX) 75 MG TABLET    Take 75 mg by mouth once daily.     EMPAGLIFLOZIN (JARDIANCE) 25 MG TABLET    Take 1 tablet (25 mg total) by mouth once daily.    FLUTICASONE PROPIONATE (FLONASE) 50 MCG/ACTUATION NASAL SPRAY    use 2 sprays (100 mcg total) by Each Nostril route once daily.    FUROSEMIDE (LASIX) 20 MG TABLET    Take 1 tablet (20 mg total) by mouth once daily.    LACTOBACILLUS RHAMNOSUS GG (CULTURELLE) 10 BILLION CELL CAPSULE    Take 1 capsule by mouth once daily.    METOPROLOL SUCCINATE (TOPROL-XL) 100 MG 24 HR TABLET    Take 1 tablet (100 mg total) by mouth daily    MULTIVITAMIN CAPSULE    Take 1 capsule by mouth once daily.    REPATHA SURECLICK 140 MG/ML PNIJ        VITAMINS  A,C,E-ZINC-COPPER (PRESERVISION AREDS) [phone] UNIT-MG-UNIT CAP    Take 1 capsule by mouth once daily.   Discontinued Medications    FLUAD QUAD 2022-23,65Y UP,,PF, 60 MCG (15 MCG X 4)/0.5 ML SYRG        HYDROCORTISONE 2.5 % CREAM    Apply topically 2 (two) times daily.    ICOSAPENT ETHYL (VASCEPA) 1 GRAM CAP    Take 2 capsules (2 g total) by mouth 2 (two) times daily.    OMEGA-3 ACID ETHYL ESTERS (LOVAZA) 1 GRAM CAPSULE    Take 1 capsule by mouth once daily.       Pablito Jean M.D.  ==========================================================================  Subjective:   Patient ID: Saumya Braun is a 78 y.o. female.  has a past medical history of Cataract, Chronic heart failure with preserved ejection fraction (3/29/2021), Chronic systolic  heart failure, Coronary artery disease involving native coronary artery of native heart without angina pectoris (4/7/2020), Hypertension associated with diabetes (12/8/2015), Ischemic cardiomyopathy, Macrocytosis without anemia (3/26/2021), Mixed hyperlipidemia, Multiple pulmonary nodules (3/26/2020), NSTEMI (non-ST elevated myocardial infarction) (3/26/2020), Post-menopause on HRT (hormone replacement therapy) (9/17/2020), and Squamous cell carcinoma of skin.   Chief Complaint: Annual Exam      Problem List Items Addressed This Visit       Type 2 diabetes mellitus with hyperglycemia, without long-term current use of insulin - Primary (Chronic)    Overview     July 2023:  A1c stable.  Patient needing refill on Jardiance.  Diabetes Medications               empagliflozin (JARDIANCE) 25 mg tablet Take 1 tablet (25 mg total) by mouth once daily.     December 2021 Diabetes Management StatusStatin: Not takingACE/ARB: Not taking A1c 7.4July 2022::Diabetes Management StatusStatin: Taking  ACE/ARB: Not taking  Diabetes Management Status    Statin: Not taking  ACE/ARB: Not taking    Screening or Prevention Patient's value Goal Complete/Controlled?   HgA1C Testing and Control   Lab Results   Component Value Date    HGBA1C 7.4 (H) 07/06/2023      Annually/Less than 8% Yes   Lipid profile : 07/06/2023 Annually Yes   LDL control Lab Results   Component Value Date    LDLCALC 26.8 (L) 07/06/2023    Annually/Less than 100 mg/dl  Yes   Nephropathy screening Lab Results   Component Value Date    LABMICR <5.0 01/20/2023     Lab Results   Component Value Date    PROTEINUA SEE COMMENT 02/04/2020     No results found for: UTPCR   Annually Yes   Blood pressure BP Readings from Last 1 Encounters:   07/11/23 110/70    Less than 140/90 Yes   Dilated retinal exam : 06/12/2023 Annually Yes   Foot exam   Most Recent Foot Exam Date: Not Found Annually No            Current Assessment & Plan     Continue Jardiance.  Patient doing well with  controlling A1c less than 8.0.  We will plan to monitor hemoglobin A1c at designated intervals 3 to 6 months.  I recommend ongoing Education for diabetic diet and exercise protocol.  We will continue to monitor for side effects.    Please be advised of symptoms to monitor for and to notify me immediately if persistent or worsening.  Follow up with Ophthalmology/Optometry and Podiatry at least annually.           Hypertension associated with diabetes (Chronic)    Overview     CHRONIC.  July 2023:  Hypertension Medications               metoprolol succinate (TOPROL-XL) 100 MG 24 hr tablet Take 1 tablet (100 mg total) by mouth daily    furosemide (LASIX) 20 MG tablet Take 1 tablet (20 mg total) by mouth once daily.     STABLE. BP Reviewed.  Compliant with BP medications. No SE reported.   (-) CP, SOB, palpitations, dizziness, lightheadedness, HA, arm numbness, tingling or weakness, syncope.  Creatinine   Date Value Ref Range Status   07/06/2023 0.8 0.5 - 1.4 mg/dL Final            Current Assessment & Plan     Counseled on importance of hypertension disease course, I recommend ongoing Education for DASH-diet and exercise.  Counseled on medication regimen importance of treating high blood pressure.  Please be advised of risk of untreated blood pressure as discussed.  Please advised of ER precautions were given for symptoms of hypertensive urgency and emergency.           Hyperlipidemia associated with type 2 diabetes mellitus (Chronic)    Overview     CHRONIC.  July 2023:  Patient now on Repatha doing well.  She is following with Jesús Viera MD : Cardiology     July 2022:  Patient could not tolerate Pravastatin due to brain fog.  Uncontrolled.  Patient reports that she cannot tolerate rosuvastatin due to side effects.. Lab analysis reviewed.   (-) CP, SOB, abdominal pain, N/V/D, constipation, jaundice, skin changes.  (-) Myalgias  Lab Results   Component Value Date    CHOL 131 07/06/2023    CHOL 116 (L)  01/20/2023    CHOL 241 (H) 07/19/2022     Lab Results   Component Value Date    HDL 40 07/06/2023    HDL 38 (L) 01/20/2023    HDL 43 07/19/2022     Lab Results   Component Value Date    LDLCALC 26.8 (L) 07/06/2023    LDLCALC 33.0 (L) 01/20/2023    LDLCALC 122.4 07/19/2022     Lab Results   Component Value Date    TRIG 321 (H) 07/06/2023    TRIG 225 (H) 01/20/2023    TRIG 378 (H) 07/19/2022     Lab Results   Component Value Date    CHOLHDL 30.5 07/06/2023    CHOLHDL 32.8 01/20/2023    CHOLHDL 17.8 (L) 07/19/2022     Lab Results   Component Value Date    TOTALCHOLEST 3.3 07/06/2023    TOTALCHOLEST 3.1 01/20/2023    TOTALCHOLEST 5.6 (H) 07/19/2022     Lab Results   Component Value Date    ALT 33 07/06/2023    AST 26 07/06/2023    ALKPHOS 54 (L) 07/06/2023    BILITOT 0.5 07/06/2023     ======================================================  The ASCVD Risk score (Juan F DK, et al., 2019) failed to calculate for the following reasons:    The 2019 ASCVD risk score is only valid for ages 40 to 79           Current Assessment & Plan     Continue Repatha.  Follow-up with Cardiology.Counseled on hyperlipidemia disease course, healthy diet and increased need for exercise.  Please be advised of the risk of cardiovascular disease, increase stroke and heart attack risk with uncontrolled/untreated hyperlipidemia.     Patient voiced understanding and understood the treatment plan. All questions were answered.              Encounter for long-term (current) use of medications (Chronic)    Overview     July 2023: Reviewed labs.  CHRONIC. Stable. Compliant with medications for managed conditions. See medication list. No SE reported.   Routine lab analysis is being monitored. Refills were addressed.  Lab Results   Component Value Date    WBC 7.25 07/06/2023    HGB 14.0 07/06/2023    HCT 44.3 07/06/2023     (H) 07/06/2023     07/06/2023       Chemistry        Component Value Date/Time     07/06/2023 0858    K 3.8  07/06/2023 0858     07/06/2023 0858    CO2 24 07/06/2023 0858    BUN 16 07/06/2023 0858    CREATININE 0.8 07/06/2023 0858     (H) 07/06/2023 0858        Component Value Date/Time    CALCIUM 9.8 07/06/2023 0858    ALKPHOS 54 (L) 07/06/2023 0858    AST 26 07/06/2023 0858    ALT 33 07/06/2023 0858    BILITOT 0.5 07/06/2023 0858    ESTGFRAFRICA >60.0 07/19/2022 0854    EGFRNONAA >60.0 07/19/2022 0854          Lab Results   Component Value Date    TSH 3.163 07/06/2023            Current Assessment & Plan     Complete history and physical was completed today.  Complete and thorough medication reconciliation was performed.  Discussed risks and benefits of medications.  Advised patient on orders and health maintenance.  We discussed old records and old labs if available.  Will request any records not available through epic.  Continue current medications listed on your summary sheet.               Review of patient's allergies indicates:   Allergen Reactions    Pravastatin      Brain fog    Rosuvastatin      Dizziness       Current Outpatient Medications   Medication Instructions    acetaminophen (TYLENOL) 650 mg, Oral    albuterol (VENTOLIN HFA) 90 mcg/actuation inhaler 2 puffs, Inhalation, Every 6 hours PRN, Rescue    blood sugar diagnostic Strp TEST BLOOD SUGAR ONCE DAILY    clopidogreL (PLAVIX) 75 mg, Oral, Daily    fluticasone propionate (FLONASE) 50 mcg/actuation nasal spray use 2 sprays (100 mcg total) by Each Nostril route once daily.    furosemide (LASIX) 20 mg, Oral, Daily    JARDIANCE 25 mg, Oral, Daily    Lactobacillus rhamnosus GG (CULTURELLE) 10 billion cell capsule 1 capsule, Oral, Daily    metoprolol succinate (TOPROL-XL) 100 MG 24 hr tablet Take 1 tablet (100 mg total) by mouth daily    multivitamin capsule 1 capsule, Oral, Daily    REPATHA SURECLICK 140 mg/mL PnIj No dose, route, or frequency recorded.    vitamins  A,C,E-zinc-copper (PRESERVISION AREDS) 14,320-226-200 unit-mg-unit Cap 1  "capsule, Oral, Daily      I have reviewed the PMH, social history, FamilyHx, surgical history, allergies and medications documented / confirmed by the patient at the time of this visit.  Review of Systems   Constitutional:  Negative for chills, fatigue, fever and unexpected weight change.   HENT:  Negative for ear pain and sore throat.    Eyes:  Negative for redness and visual disturbance.   Respiratory:  Negative for cough and shortness of breath.    Cardiovascular:  Negative for chest pain and palpitations.   Gastrointestinal:  Negative for nausea and vomiting.   Genitourinary:  Negative for difficulty urinating and hematuria.   Musculoskeletal:  Negative for arthralgias and myalgias.   Skin:  Negative for rash and wound.   Neurological:  Negative for weakness and headaches.   Psychiatric/Behavioral:  Negative for sleep disturbance. The patient is not nervous/anxious.    Objective:   /70 (BP Location: Right arm)   Pulse 68   Ht 5' 1" (1.549 m)   Wt 57.4 kg (126 lb 9.6 oz)   LMP 11/14/1986   SpO2 97%   BMI 23.92 kg/m²   Physical Exam  Vitals and nursing note reviewed.   Constitutional:       General: She is not in acute distress.     Appearance: She is well-developed. She is not ill-appearing, toxic-appearing or diaphoretic.   HENT:      Head: Normocephalic and atraumatic.      Right Ear: Hearing and external ear normal.      Left Ear: Hearing and external ear normal.      Nose: Nose normal. No rhinorrhea.   Eyes:      General: Lids are normal.      Extraocular Movements: Extraocular movements intact.      Conjunctiva/sclera: Conjunctivae normal.      Pupils: Pupils are equal, round, and reactive to light.   Cardiovascular:      Rate and Rhythm: Normal rate.      Pulses: Normal pulses.   Pulmonary:      Effort: Pulmonary effort is normal. No respiratory distress.      Breath sounds: Normal breath sounds.   Abdominal:      General: Bowel sounds are normal.      Palpations: Abdomen is soft. "   Musculoskeletal:         General: Normal range of motion.      Cervical back: Normal range of motion and neck supple.   Skin:     General: Skin is warm and dry.      Capillary Refill: Capillary refill takes less than 2 seconds.      Coloration: Skin is not pale.   Neurological:      General: No focal deficit present.      Mental Status: She is alert and oriented to person, place, and time. Mental status is at baseline. She is not disoriented.      Cranial Nerves: No cranial nerve deficit.      Motor: No weakness.      Gait: Gait normal.   Psychiatric:         Attention and Perception: She is attentive.         Mood and Affect: Mood normal. Mood is not anxious or depressed.         Speech: Speech is not rapid and pressured or slurred.         Behavior: Behavior normal. Behavior is not agitated, aggressive or hyperactive. Behavior is cooperative.         Thought Content: Thought content normal. Thought content is not paranoid or delusional. Thought content does not include homicidal or suicidal ideation. Thought content does not include homicidal or suicidal plan.         Cognition and Memory: Memory is not impaired.         Judgment: Judgment normal.       Assessment:     1. Type 2 diabetes mellitus with hyperglycemia, without long-term current use of insulin    2. Hyperlipidemia associated with type 2 diabetes mellitus    3. Hypertension associated with diabetes    4. Encounter for long-term (current) use of medications      MDM:   Moderate medical complexity.  Moderate risk.  Total time: 33 minutes.  This includes total time spent on the encounter, which includes face to face time and non-face to face time preparing to see the patient (eg, review of previous medical records, tests), Obtaining and/or reviewing separately obtained history, documenting clinical information in the electronic or other health record, independently interpreting results (not separately reported)/communicating results to the  patient/family/caregiver, and/or care coordination (not separately reported).    I have Reviewed and summarized old records.  I have performed thorough medication reconciliation today and discussed risk and benefits of medications.  I have reviewed labs and discussed with patient.  All questions were answered.  I am requesting old records and will review them once they are available.  Cardiology    I have signed for the following orders AND/OR meds.  No orders of the defined types were placed in this encounter.          Follow up in about 6 months (around 1/11/2024), or if symptoms worsen or fail to improve, for Fasting Labs in 6 months.  Future Appointments       Date Specialty Appt Notes    1/11/2024 Lab labs          If no improvement in symptoms or symptoms worsen, advised to call/follow-up at clinic or go to ER. Patient voiced understanding and all questions/concerns were addressed.   DISCLAIMER: This note was compiled by using a speech recognition dictation system and therefore please be aware that typographical / speech recognition errors can and do occur.  Please contact me if you see any errors specifically.    Pablito Jean M.D.       Office: [phone] [address] [place] [address]  FAX: [phone]

## 2023-08-29 DIAGNOSIS — E11.65 TYPE 2 DIABETES MELLITUS WITH HYPERGLYCEMIA, WITHOUT LONG-TERM CURRENT USE OF INSULIN: Chronic | ICD-10-CM

## 2023-08-29 RX ORDER — EMPAGLIFLOZIN 25 MG/1
TABLET, FILM COATED ORAL
Qty: 90 TABLET | Refills: 1 | Status: SHIPPED | OUTPATIENT
Start: 2023-08-29 | End: 2024-01-30

## 2023-08-29 NOTE — TELEPHONE ENCOUNTER
Refill Authorization Note     Refill Decision Note   Saumya Braun  is requesting a refill authorization.  Brief Assessment and Rationale for Refill:  Approve     Medication Therapy Plan:       Medication Reconciliation Completed: No   Comments:     No Care Gaps recommended.     Note composed:1:38 PM 08/29/2023

## 2023-08-29 NOTE — TELEPHONE ENCOUNTER
No care due was identified.  [facility] Embedded Care Due Messages. Reference number: [id number].   8/29/2023 9:50:10 AM CDT

## 2023-11-21 RX ORDER — FLUTICASONE PROPIONATE 50 MCG
SPRAY, SUSPENSION (ML) NASAL
Qty: 48 G | Refills: 2 | Status: SHIPPED | OUTPATIENT
Start: 2023-11-21

## 2023-11-21 NOTE — TELEPHONE ENCOUNTER
Refill Decision Note   Saumya Kade  is requesting a refill authorization.  Brief Assessment and Rationale for Refill:  Approve     Medication Therapy Plan:  FLOS      Comments:     Note composed:10:39 AM 11/21/2023

## 2023-11-21 NOTE — TELEPHONE ENCOUNTER
Care Due:                  Date            Visit Type   Department     Provider  --------------------------------------------------------------------------------                                EP -                              PRIMARY      [facility] FAMILY  Last Visit: 07-      CARE ([facility])   MEDICINE       Pablito Jean  Next Visit: None Scheduled  None         None Found                                                            Last  Test          Frequency    Reason                     Performed    Due Date  --------------------------------------------------------------------------------    HBA1C.......  6 months...  JARDIANCE................  07- 01-    [facility] Embedded Care Due Messages. Reference number: [id number].   11/21/2023 10:14:29 AM CST

## 2024-01-11 ENCOUNTER — LAB VISIT (OUTPATIENT)
Dept: LAB | Facility: HOSPITAL | Age: 79
End: 2024-01-11
Attending: FAMILY MEDICINE
Payer: MEDICARE

## 2024-01-11 DIAGNOSIS — Z79.899 ENCOUNTER FOR LONG-TERM (CURRENT) USE OF MEDICATIONS: ICD-10-CM

## 2024-01-11 DIAGNOSIS — I15.2 HYPERTENSION ASSOCIATED WITH DIABETES: ICD-10-CM

## 2024-01-11 DIAGNOSIS — E11.65 TYPE 2 DIABETES MELLITUS WITH HYPERGLYCEMIA, WITHOUT LONG-TERM CURRENT USE OF INSULIN: ICD-10-CM

## 2024-01-11 DIAGNOSIS — E11.69 HYPERLIPIDEMIA ASSOCIATED WITH TYPE 2 DIABETES MELLITUS: ICD-10-CM

## 2024-01-11 DIAGNOSIS — E11.59 HYPERTENSION ASSOCIATED WITH DIABETES: ICD-10-CM

## 2024-01-11 DIAGNOSIS — E78.5 HYPERLIPIDEMIA ASSOCIATED WITH TYPE 2 DIABETES MELLITUS: ICD-10-CM

## 2024-01-11 LAB
ALBUMIN SERPL BCP-MCNC: 3.8 G/DL (ref 3.5–5.2)
ALP SERPL-CCNC: 48 U/L (ref 55–135)
ALT SERPL W/O P-5'-P-CCNC: 23 U/L (ref 10–44)
ANION GAP SERPL CALC-SCNC: 14 MMOL/L (ref 8–16)
AST SERPL-CCNC: 27 U/L (ref 10–40)
BACTERIA #/AREA URNS AUTO: NORMAL /HPF
BILIRUB SERPL-MCNC: 0.4 MG/DL (ref 0.1–1)
BILIRUB UR QL STRIP: NEGATIVE
BUN SERPL-MCNC: 15 MG/DL (ref 8–23)
CALCIUM SERPL-MCNC: 9.6 MG/DL (ref 8.7–10.5)
CHLORIDE SERPL-SCNC: 104 MMOL/L (ref 95–110)
CHOLEST SERPL-MCNC: 141 MG/DL (ref 120–199)
CHOLEST/HDLC SERPL: 3.3 {RATIO} (ref 2–5)
CLARITY UR REFRACT.AUTO: CLEAR
CO2 SERPL-SCNC: 24 MMOL/L (ref 23–29)
COLOR UR AUTO: YELLOW
CREAT SERPL-MCNC: 0.7 MG/DL (ref 0.5–1.4)
ERYTHROCYTE [DISTWIDTH] IN BLOOD BY AUTOMATED COUNT: 13.7 % (ref 11.5–14.5)
EST. GFR  (NO RACE VARIABLE): >60 ML/MIN/1.73 M^2
ESTIMATED AVG GLUCOSE: 163 MG/DL (ref 68–131)
GLUCOSE SERPL-MCNC: 142 MG/DL (ref 70–110)
GLUCOSE UR QL STRIP: ABNORMAL
HBA1C MFR BLD: 7.3 % (ref 4–5.6)
HCT VFR BLD AUTO: 42.9 % (ref 37–48.5)
HDLC SERPL-MCNC: 43 MG/DL (ref 40–75)
HDLC SERPL: 30.5 % (ref 20–50)
HGB BLD-MCNC: 13.6 G/DL (ref 12–16)
HGB UR QL STRIP: NEGATIVE
KETONES UR QL STRIP: NEGATIVE
LDLC SERPL CALC-MCNC: 47.2 MG/DL (ref 63–159)
LEUKOCYTE ESTERASE UR QL STRIP: ABNORMAL
MCH RBC QN AUTO: 31.4 PG (ref 27–31)
MCHC RBC AUTO-ENTMCNC: 31.7 G/DL (ref 32–36)
MCV RBC AUTO: 99 FL (ref 82–98)
MICROSCOPIC COMMENT: NORMAL
NITRITE UR QL STRIP: NEGATIVE
NONHDLC SERPL-MCNC: 98 MG/DL
PH UR STRIP: 5 [PH] (ref 5–8)
PLATELET # BLD AUTO: 203 K/UL (ref 150–450)
PMV BLD AUTO: 9.9 FL (ref 9.2–12.9)
POTASSIUM SERPL-SCNC: 3.7 MMOL/L (ref 3.5–5.1)
PROT SERPL-MCNC: 7 G/DL (ref 6–8.4)
PROT UR QL STRIP: NEGATIVE
RBC # BLD AUTO: 4.33 M/UL (ref 4–5.4)
RBC #/AREA URNS AUTO: 4 /HPF (ref 0–4)
SODIUM SERPL-SCNC: 142 MMOL/L (ref 136–145)
SP GR UR STRIP: 1.03 (ref 1–1.03)
SQUAMOUS #/AREA URNS AUTO: 2 /HPF
TRIGL SERPL-MCNC: 254 MG/DL (ref 30–150)
TSH SERPL DL<=0.005 MIU/L-ACNC: 2.12 UIU/ML (ref 0.4–4)
URN SPEC COLLECT METH UR: ABNORMAL
WBC # BLD AUTO: 5.27 K/UL (ref 3.9–12.7)
WBC #/AREA URNS AUTO: 4 /HPF (ref 0–5)
YEAST UR QL AUTO: NORMAL

## 2024-01-11 PROCEDURE — 81001 URINALYSIS AUTO W/SCOPE: CPT | Mod: HCNC | Performed by: FAMILY MEDICINE

## 2024-01-11 PROCEDURE — 80061 LIPID PANEL: CPT | Mod: HCNC | Performed by: FAMILY MEDICINE

## 2024-01-11 PROCEDURE — 80053 COMPREHEN METABOLIC PANEL: CPT | Mod: HCNC | Performed by: FAMILY MEDICINE

## 2024-01-11 PROCEDURE — 85027 COMPLETE CBC AUTOMATED: CPT | Mod: HCNC | Performed by: FAMILY MEDICINE

## 2024-01-11 PROCEDURE — 84443 ASSAY THYROID STIM HORMONE: CPT | Mod: HCNC | Performed by: FAMILY MEDICINE

## 2024-01-11 PROCEDURE — 36415 COLL VENOUS BLD VENIPUNCTURE: CPT | Mod: HCNC,PO | Performed by: FAMILY MEDICINE

## 2024-01-11 PROCEDURE — 83036 HEMOGLOBIN GLYCOSYLATED A1C: CPT | Mod: HCNC | Performed by: FAMILY MEDICINE

## 2024-01-12 NOTE — PROGRESS NOTES
Make follow-up lab appointment per recommendation below.  Check to see if patient has seen the results through my chart.  If not then,  #CALL THE PATIENT# to discuss results/see if they have questions and document verification of contact. Make F/U appt if needed. [phone]    #My interpretation that was sent to them through Clerk:  Saumya, I have reviewed your recent blood work.     Urinalysis is showing dehydration.  Increase hydration with water.  There is glucose in the urine which is consistent with diabetes and the medication you are taking called Jardiance.  No infection under the microscope.  Your complete blood count is stable.    Your metabolic panel which shows your glucose, kidney function, electrolytes, and liver function is stable.   Thyroid study is normal.   Your cholesterol is stable.  Triglycerides are too high.  Please follow-up with me to discuss treatment for hypertriglyceridemia.  Your hemoglobin A1c is stable.  This test is gold standard screening test for diabetes.  It is a measures 3 months of your average blood sugar.  =========================  Also please address any outstanding health maintenance that may be due: RSV Vaccine (Age 60+ and Pregnant patients)(1 - 1-dose 60+ series) Never done  DEXA Scan due on 02/13/2022  Diabetes Urine Screening due on 01/20/2024

## 2024-01-30 DIAGNOSIS — E11.65 TYPE 2 DIABETES MELLITUS WITH HYPERGLYCEMIA, WITHOUT LONG-TERM CURRENT USE OF INSULIN: Chronic | ICD-10-CM

## 2024-01-30 RX ORDER — EMPAGLIFLOZIN 25 MG/1
TABLET, FILM COATED ORAL
Qty: 90 TABLET | Refills: 0 | Status: SHIPPED | OUTPATIENT
Start: 2024-01-30 | End: 2024-06-05 | Stop reason: SDUPTHER

## 2024-01-30 NOTE — TELEPHONE ENCOUNTER
No care due was identified.  [facility] Embedded Care Due Messages. Reference number: [id number].   1/30/2024 12:40:40 PM CST

## 2024-01-30 NOTE — TELEPHONE ENCOUNTER
Refill Routing Note   Medication(s) are not appropriate for processing by [facility] for the following reason(s):        Drug-disease interaction    [facility] action(s):  Defer        Medication Therapy Plan: JARDIANCE and Hypertension associated with diabetes    Pharmacist review requested: Yes     Appointments  past 12m or future 3m with PCP    Date Provider   Last Visit   7/11/2023 Pablito Jean MD   Next Visit   2/7/2024 Pablito Jean MD   ED visits in past 90 days: 0        Note composed:2:58 PM 01/30/2024

## 2024-01-30 NOTE — TELEPHONE ENCOUNTER
Refill Decision Note   Saumya Kade  is requesting a refill authorization.  Brief Assessment and Rationale for Refill:  Approve     Medication Therapy Plan:  JARDIANCE and Hypertension associated with diabetes    Medication Reconciliation Completed: No   Comments:     No Care Gaps recommended.     Note composed:3:04 PM 01/30/2024

## 2024-02-07 ENCOUNTER — OFFICE VISIT (OUTPATIENT)
Dept: FAMILY MEDICINE | Facility: CLINIC | Age: 79
End: 2024-02-07
Payer: MEDICARE

## 2024-02-07 ENCOUNTER — HOSPITAL ENCOUNTER (OUTPATIENT)
Dept: RADIOLOGY | Facility: HOSPITAL | Age: 79
Discharge: HOME | End: 2024-02-07
Attending: FAMILY MEDICINE
Payer: MEDICARE

## 2024-02-07 VITALS
HEIGHT: 61 IN | RESPIRATION RATE: 18 BRPM | HEART RATE: 69 BPM | TEMPERATURE: 99 F | BODY MASS INDEX: 23.85 KG/M2 | OXYGEN SATURATION: 98 % | WEIGHT: 126.31 LBS

## 2024-02-07 DIAGNOSIS — E78.5 HYPERLIPIDEMIA ASSOCIATED WITH TYPE 2 DIABETES MELLITUS: ICD-10-CM

## 2024-02-07 DIAGNOSIS — Z79.899 ENCOUNTER FOR LONG-TERM (CURRENT) USE OF MEDICATIONS: ICD-10-CM

## 2024-02-07 DIAGNOSIS — M54.2 NECK PAIN ON LEFT SIDE: ICD-10-CM

## 2024-02-07 DIAGNOSIS — I15.2 HYPERTENSION ASSOCIATED WITH DIABETES: ICD-10-CM

## 2024-02-07 DIAGNOSIS — Z78.0 MENOPAUSE: ICD-10-CM

## 2024-02-07 DIAGNOSIS — E78.1 HYPERTRIGLYCERIDEMIA: ICD-10-CM

## 2024-02-07 DIAGNOSIS — E11.59 HYPERTENSION ASSOCIATED WITH DIABETES: ICD-10-CM

## 2024-02-07 DIAGNOSIS — E11.69 HYPERLIPIDEMIA ASSOCIATED WITH TYPE 2 DIABETES MELLITUS: ICD-10-CM

## 2024-02-07 DIAGNOSIS — E11.65 TYPE 2 DIABETES MELLITUS WITH HYPERGLYCEMIA, WITHOUT LONG-TERM CURRENT USE OF INSULIN: Chronic | ICD-10-CM

## 2024-02-07 DIAGNOSIS — M54.2 NECK PAIN ON LEFT SIDE: Primary | ICD-10-CM

## 2024-02-07 PROCEDURE — 1125F AMNT PAIN NOTED PAIN PRSNT: CPT | Mod: HCNC,CPTII,S$GLB, | Performed by: FAMILY MEDICINE

## 2024-02-07 PROCEDURE — 1159F MED LIST DOCD IN RCRD: CPT | Mod: HCNC,CPTII,S$GLB, | Performed by: FAMILY MEDICINE

## 2024-02-07 PROCEDURE — 72040 X-RAY EXAM NECK SPINE 2-3 VW: CPT | Mod: TC,HCNC,PO

## 2024-02-07 PROCEDURE — 99214 OFFICE O/P EST MOD 30 MIN: CPT | Mod: HCNC,S$GLB,, | Performed by: FAMILY MEDICINE

## 2024-02-07 PROCEDURE — 72040 X-RAY EXAM NECK SPINE 2-3 VW: CPT | Mod: 26,HCNC,, | Performed by: RADIOLOGY

## 2024-02-07 PROCEDURE — 3288F FALL RISK ASSESSMENT DOCD: CPT | Mod: HCNC,CPTII,S$GLB, | Performed by: FAMILY MEDICINE

## 2024-02-07 PROCEDURE — 1160F RVW MEDS BY RX/DR IN RCRD: CPT | Mod: HCNC,CPTII,S$GLB, | Performed by: FAMILY MEDICINE

## 2024-02-07 PROCEDURE — 1101F PT FALLS ASSESS-DOCD LE1/YR: CPT | Mod: HCNC,CPTII,S$GLB, | Performed by: FAMILY MEDICINE

## 2024-02-07 PROCEDURE — 99999 PR PBB SHADOW E&M-EST. PATIENT-LVL V: CPT | Mod: PBBFAC,HCNC,, | Performed by: FAMILY MEDICINE

## 2024-02-07 RX ORDER — IBUPROFEN 400 MG/1
400 TABLET ORAL EVERY 6 HOURS PRN
Qty: 30 TABLET | Refills: 0 | Status: SHIPPED | OUTPATIENT
Start: 2024-02-07

## 2024-02-07 RX ORDER — INFLUENZA A VIRUS A/VICTORIA/4897/2022 IVR-238 (H1N1) ANTIGEN (FORMALDEHYDE INACTIVATED), INFLUENZA A VIRUS A/DARWIN/6/2021 IVR-227 (H3N2) ANTIGEN (FORMALDEHYDE INACTIVATED), INFLUENZA B VIRUS B/AUSTRIA/1359417/2021 BVR-26 ANTIGEN (FORMALDEHYDE INACTIVATED), INFLUENZA B VIRUS B/PHUKET/3073/2013 BVR-1B ANTIGEN (FORMALDEHYDE INACTIVATED) 15; 15; 15; 15 UG/.5ML; UG/.5ML; UG/.5ML; UG/.5ML
INJECTION, SUSPENSION INTRAMUSCULAR
COMMUNITY
Start: 2023-10-10 | End: 2024-02-07

## 2024-02-07 RX ORDER — COVID-19 VACCINE, MRNA 0.05 MG/.48ML
INJECTION, SUSPENSION INTRAMUSCULAR
COMMUNITY
Start: 2023-12-06

## 2024-02-07 NOTE — PROGRESS NOTES
1st check to see if patient has seen the results.  If not then  CALL patient with results and Document verification.  Schedule follow-up if needed.  [phone]  X-ray of the cervical spine reviewed by radiology.  There are some degenerative change of the cervical spine however I believe that your pain is mostly muscular in nature.  I recommend that you try physical therapy 1st.  Follow-up with me sooner if no improvement.  Treatment plan as we discussed.

## 2024-02-07 NOTE — PROGRESS NOTES
PLAN:      Problem List Items Addressed This Visit       Type 2 diabetes mellitus with hyperglycemia, without long-term current use of insulin (Chronic)     Continue Jardiance.  Patient doing well with controlling A1c less than 8.0.  We will plan to monitor hemoglobin A1c at designated intervals 3 to 6 months.  I recommend ongoing Education for diabetic diet and exercise protocol.  We will continue to monitor for side effects.    Please be advised of symptoms to monitor for and to notify me immediately if persistent or worsening.  Follow up with Ophthalmology/Optometry and Podiatry at least annually.           Relevant Orders    Microalbumin/Creatinine Ratio, Urine    Hypertension associated with diabetes (Chronic)     Continue current medications.  Blood pressure well controlled.  Counseled on importance of hypertension disease course, I recommend ongoing Education for DASH-diet and exercise.  Counseled on medication regimen importance of treating high blood pressure.  Please be advised of risk of untreated blood pressure as discussed.  Please advised of ER precautions were given for symptoms of hypertensive urgency and emergency.           Hyperlipidemia associated with type 2 diabetes mellitus (Chronic)     Adding fish oil.  Continue exercise.  Low-fat high-fiber diet.  Continue Repatha.  Follow-up with Cardiology.  Counseled on hyperlipidemia disease course, healthy diet and increased need for exercise.  Please be advised of the risk of cardiovascular disease, increase stroke and heart attack risk with uncontrolled/untreated hyperlipidemia.     Patient voiced understanding and understood the treatment plan. All questions were answered.            Encounter for long-term (current) use of medications (Chronic)     Complete history and physical was completed today.  Complete and thorough medication reconciliation was performed.  Discussed risks and benefits of medications.  Advised patient on orders and health  maintenance.  We discussed old records and old labs if available.  Will request any records not available through epic.  Continue current medications listed on your summary sheet.           Menopause     Continue to implement lifestyle modification with diet and exercise.  May need calcium vitamin-D supplement.  Continue multivitamin.  Update bone density scan.         Relevant Orders    DXA Bone Density Axial Skeleton 1 or more sites    Neck pain on left side - Primary     Neck pain consistent with musculoskeletal spasm/strain.  Start ibuprofen low-dose.  Increase hydration with water.  Referral to physical therapy.         Relevant Medications    ibuprofen (ADVIL,MOTRIN) 400 MG tablet    Other Relevant Orders    Ambulatory referral/consult to Physical/Occupational Therapy    X-Ray Cervical Spine AP And Lateral    Hypertriglyceridemia     Consider Vascepa if no improvement with fish oil over-the-counter.         Relevant Medications    omega-3 fatty acids-fish oil 340-1,000 mg Cap     Future Appointments       Date Specialty Appt Notes    2/7/2024 Radiology .    2/20/2024 Radiology .           Medication Management for assessment above:   Medication List with Changes/Refills   New Medications    IBUPROFEN (ADVIL,MOTRIN) 400 MG TABLET    Take 1 tablet (400 mg total) by mouth every 6 (six) hours as needed for Other.    OMEGA-3 FATTY ACIDS-FISH -1,000 MG CAP    Take 1 capsule by mouth once daily.   Current Medications    ACETAMINOPHEN (TYLENOL) 325 MG TABLET    Take 650 mg by mouth.    ALBUTEROL (VENTOLIN HFA) 90 MCG/ACTUATION INHALER    Inhale 2 puffs into the lungs every 6 (six) hours as needed for Wheezing. Rescue    BLOOD SUGAR DIAGNOSTIC STRP    TEST BLOOD SUGAR ONCE DAILY    CLOPIDOGREL (PLAVIX) 75 MG TABLET    Take 75 mg by mouth once daily.     COMIRNATY 2023-24, 12Y UP,,PF, 30 MCG/0.3 ML INJECTION        FLUTICASONE PROPIONATE (FLONASE) 50 MCG/ACTUATION NASAL SPRAY    use 2 sprays (100 mcg total) by  Each Nostril route once daily.    FUROSEMIDE (LASIX) 20 MG TABLET    Take 1 tablet (20 mg total) by mouth once daily.    JARDIANCE 25 MG TABLET    TAKE 1 TABLET (25 MG) BY MOUTH 1 TIME A DAY    LACTOBACILLUS RHAMNOSUS GG (CULTURELLE) 10 BILLION CELL CAPSULE    Take 1 capsule by mouth once daily.    METOPROLOL SUCCINATE (TOPROL-XL) 100 MG 24 HR TABLET    Take 1 tablet (100 mg total) by mouth daily    MULTIVITAMIN CAPSULE    Take 1 capsule by mouth once daily.    REPATHA SURECLICK 140 MG/ML PNIJ        VITAMINS  A,C,E-ZINC-COPPER (PRESERVISION AREDS) 14,320-226-200 UNIT-MG-UNIT CAP    Take 1 capsule by mouth once daily.   Discontinued Medications    FLUAD QUAD 2023-24,65Y UP,,PF, 60 MCG (15 MCG X 4)/0.5 ML SYRG           Pablito Jean M.D.  ==========================================================================  Subjective:   Patient ID: Saumya Braun is a 78 y.o. female.  has a past medical history of Cataract, Chronic heart failure with preserved ejection fraction (3/29/2021), Chronic systolic heart failure, Coronary artery disease involving native coronary artery of native heart without angina pectoris (4/7/2020), Hypertension associated with diabetes (12/8/2015), Ischemic cardiomyopathy, Macrocytosis without anemia (3/26/2021), Mixed hyperlipidemia, Multiple pulmonary nodules (3/26/2020), NSTEMI (non-ST elevated myocardial infarction) (3/26/2020), Post-menopause on HRT (hormone replacement therapy) (9/17/2020), and Squamous cell carcinoma of skin.   Chief Complaint: review lab results      Problem List Items Addressed This Visit       Type 2 diabetes mellitus with hyperglycemia, without long-term current use of insulin (Chronic)    Overview     February 2024:  Diabetes Medications               JARDIANCE 25 mg tablet TAKE 1 TABLET (25 MG) BY MOUTH 1 TIME A DAY        July 2023:  A1c stable.  Patient needing refill on Jardiance.  Diabetes Medications               empagliflozin (JARDIANCE) 25 mg tablet  "Take 1 tablet (25 mg total) by mouth once daily.          December 2021 Diabetes Management StatusStatin: Not takingACE/ARB: Not taking A1c 7.4July 2022::Diabetes Management StatusStatin: Taking  ACE/ARB: Not taking  Diabetes Management Status    Statin: Not taking  ACE/ARB: Not taking    Screening or Prevention Patient's value Goal Complete/Controlled?   HgA1C Testing and Control   Lab Results   Component Value Date    HGBA1C 7.3 (H) 01/11/2024      Annually/Less than 8% Yes   Lipid profile : 01/11/2024 Annually Yes   LDL control Lab Results   Component Value Date    LDLCALC 47.2 (L) 01/11/2024    Annually/Less than 100 mg/dl  Yes   Nephropathy screening Lab Results   Component Value Date    LABMICR <5.0 01/20/2023     Lab Results   Component Value Date    PROTEINUA Negative 01/11/2024     No results found for: "UTPCR"   Annually Yes   Blood pressure BP Readings from Last 1 Encounters:   07/11/23 110/70    Less than 140/90 Yes   Dilated retinal exam : 10/02/2023 Annually Yes   Foot exam   Most Recent Foot Exam Date: Not Found Annually No              Current Assessment & Plan     Continue Jardiance.  Patient doing well with controlling A1c less than 8.0.  We will plan to monitor hemoglobin A1c at designated intervals 3 to 6 months.  I recommend ongoing Education for diabetic diet and exercise protocol.  We will continue to monitor for side effects.    Please be advised of symptoms to monitor for and to notify me immediately if persistent or worsening.  Follow up with Ophthalmology/Optometry and Podiatry at least annually.           Hypertension associated with diabetes (Chronic)    Overview     February 2024:  Hypertension Medications               metoprolol succinate (TOPROL-XL) 100 MG 24 hr tablet Take 1 tablet (100 mg total) by mouth daily    furosemide (LASIX) 20 MG tablet Take 1 tablet (20 mg total) by mouth once daily.          CHRONIC.  July 2023:Reviewed medications.  Doing well on metoprolol and " Lasix.      STABLE. BP Reviewed.  Compliant with BP medications. No SE reported.   (-) CP, SOB, palpitations, dizziness, lightheadedness, HA, arm numbness, tingling or weakness, syncope.  Creatinine   Date Value Ref Range Status   07/06/2023 0.8 0.5 - 1.4 mg/dL Final     Results for orders placed or performed in visit on 03/26/20   EKG 12-lead    Collection Time: 03/26/20  2:49 PM    Narrative    Test Reason : R00.0,    Vent. Rate : 115 BPM     Atrial Rate : 115 BPM     P-R Int : 128 ms          QRS Dur : 142 ms      QT Int : 376 ms       P-R-T Axes : 021 -69 094 degrees     QTc Int : 520 ms    Sinus tachycardia  Left axis deviation  Left bundle branch block  Abnormal ECG  When compared with ECG of 21-NOV-2011 10:57,  Vent. rate has increased BY  57 BPM  Left bundle branch block has replaced Right bundle branch block  Confirmed by TAYO CAMEJO MD ([id number]) on 3/27/2020 11:28:10 AM    Referred By: HERNAN GARCIA           Confirmed By:TAYO CAMEJO MD            Current Assessment & Plan     Continue current medications.  Blood pressure well controlled.  Counseled on importance of hypertension disease course, I recommend ongoing Education for DASH-diet and exercise.  Counseled on medication regimen importance of treating high blood pressure.  Please be advised of risk of untreated blood pressure as discussed.  Please advised of ER precautions were given for symptoms of hypertensive urgency and emergency.           Hyperlipidemia associated with type 2 diabetes mellitus (Chronic)    Overview     February 2023:  Patient with elevated triglycerides.  LDL well-controlled.  Patient reports she started working out recently.  Hyperlipidemia Medications               REPATHA SURECLICK 140 mg/mL PnIj     omega-3 fatty acids-fish oil 340-1,000 mg Cap Take 1 capsule by mouth once daily.            CHRONIC.  July 2023:  Patient now on Repatha doing well.  She is following with Jesús Viera MD : Cardiology     July 2022:   Patient could not tolerate Pravastatin due to brain fog.  Uncontrolled.  Patient reports that she cannot tolerate rosuvastatin due to side effects.. Lab analysis reviewed.   (-) CP, SOB, abdominal pain, N/V/D, constipation, jaundice, skin changes.  (-) Myalgias  Lab Results   Component Value Date    CHOL 141 01/11/2024    CHOL 139 11/28/2023    CHOL 131 07/06/2023     Lab Results   Component Value Date    HDL 43 01/11/2024    HDL 40 11/28/2023    HDL 40 07/06/2023     Lab Results   Component Value Date    LDLCALC 47.2 (L) 01/11/2024    LDLCALC 53 11/28/2023    LDLCALC 26.8 (L) 07/06/2023     Lab Results   Component Value Date    TRIG 254 (H) 01/11/2024    TRIG 230 (H) 11/28/2023    TRIG 321 (H) 07/06/2023     Lab Results   Component Value Date    CHOLHDL 30.5 01/11/2024    CHOLHDL 3.5 11/28/2023    CHOLHDL 30.5 07/06/2023     Lab Results   Component Value Date    TOTALCHOLEST 3.3 01/11/2024    TOTALCHOLEST 3.3 07/06/2023    TOTALCHOLEST 3.1 01/20/2023     Lab Results   Component Value Date    ALT 23 01/11/2024    AST 27 01/11/2024    ALKPHOS 48 (L) 01/11/2024    BILITOT 0.4 01/11/2024   ======================================================  The ASCVD Risk score (Juan F DK, et al., 2019) failed to calculate for the following reasons:    The 2019 ASCVD risk score is only valid for ages 40 to 79           Current Assessment & Plan     Adding fish oil.  Continue exercise.  Low-fat high-fiber diet.  Continue Repatha.  Follow-up with Cardiology.  Counseled on hyperlipidemia disease course, healthy diet and increased need for exercise.  Please be advised of the risk of cardiovascular disease, increase stroke and heart attack risk with uncontrolled/untreated hyperlipidemia.     Patient voiced understanding and understood the treatment plan. All questions were answered.            Encounter for long-term (current) use of medications (Chronic)    Overview     February 2024: Reviewed labs.  July 2023: Reviewed labs.  CHRONIC.  Stable. Compliant with medications for managed conditions. See medication list. No SE reported.   Routine lab analysis is being monitored. Refills were addressed.  Lab Results   Component Value Date    WBC 5.27 01/11/2024    HGB 13.6 01/11/2024    HCT 42.9 01/11/2024    MCV 99 (H) 01/11/2024     01/11/2024       Chemistry        Component Value Date/Time     01/11/2024 0826    K 3.7 01/11/2024 0826     01/11/2024 0826    CO2 24 01/11/2024 0826    BUN 15 01/11/2024 0826    CREATININE 0.7 01/11/2024 0826     (H) 01/11/2024 0826        Component Value Date/Time    CALCIUM 9.6 01/11/2024 0826    ALKPHOS 48 (L) 01/11/2024 0826    AST 27 01/11/2024 0826    ALT 23 01/11/2024 0826    BILITOT 0.4 01/11/2024 0826    ESTGFRAFRICA >60.0 07/19/2022 0854    EGFRNONAA >60.0 07/19/2022 0854        Lab Results   Component Value Date    TSH 2.122 01/11/2024            Current Assessment & Plan     Complete history and physical was completed today.  Complete and thorough medication reconciliation was performed.  Discussed risks and benefits of medications.  Advised patient on orders and health maintenance.  We discussed old records and old labs if available.  Will request any records not available through epic.  Continue current medications listed on your summary sheet.           Menopause    Overview     Last bone density scan in 2020 was within normal limits.  Patient due for update.         Current Assessment & Plan     Continue to implement lifestyle modification with diet and exercise.  May need calcium vitamin-D supplement.  Continue multivitamin.  Update bone density scan.         Neck pain on left side - Primary    Overview     New problem.  Started over the last three days.  Patient reports that she woke up with neck pain on her left side.  No injury or trauma reported.  She has been taking Tylenol with no improvement.         Current Assessment & Plan     Neck pain consistent with musculoskeletal  spasm/strain.  Start ibuprofen low-dose.  Increase hydration with water.  Referral to physical therapy.         Hypertriglyceridemia    Overview     See hyperlipidemia problem.         Current Assessment & Plan     Consider Vascepa if no improvement with fish oil over-the-counter.             Review of patient's allergies indicates:   Allergen Reactions    Pravastatin      Brain fog    Rosuvastatin      Dizziness       Current Outpatient Medications   Medication Instructions    acetaminophen (TYLENOL) 650 mg, Oral    albuterol (VENTOLIN HFA) 90 mcg/actuation inhaler 2 puffs, Inhalation, Every 6 hours PRN, Rescue    blood sugar diagnostic Strp TEST BLOOD SUGAR ONCE DAILY    clopidogreL (PLAVIX) 75 mg, Oral, Daily    COMIRNATY 2023-24, 12Y UP,,PF, 30 mcg/0.3 mL injection     fluticasone propionate (FLONASE) 50 mcg/actuation nasal spray use 2 sprays (100 mcg total) by Each Nostril route once daily.    furosemide (LASIX) 20 mg, Oral, Daily    ibuprofen (ADVIL,MOTRIN) 400 mg, Oral, Every 6 hours PRN    JARDIANCE 25 mg tablet TAKE 1 TABLET (25 MG) BY MOUTH 1 TIME A DAY    Lactobacillus rhamnosus GG (CULTURELLE) 10 billion cell capsule 1 capsule, Oral, Daily    metoprolol succinate (TOPROL-XL) 100 MG 24 hr tablet Take 1 tablet (100 mg total) by mouth daily    multivitamin capsule 1 capsule, Oral, Daily    omega-3 fatty acids-fish oil 340-1,000 mg Cap 1 capsule, Oral, Daily    REPATHA SURECLICK 140 mg/mL PnIj No dose, route, or frequency recorded.    vitamins  A,C,E-zinc-copper (PRESERVISION AREDS) 14,320-226-200 unit-mg-unit Cap 1 capsule, Oral, Daily      I have reviewed the PMH, social history, FamilyHx, surgical history, allergies and medications documented / confirmed by the patient at the time of this visit.  Review of Systems   Constitutional:  Negative for chills, fatigue, fever and unexpected weight change.   HENT:  Negative for ear pain and sore throat.    Eyes:  Negative for redness and visual disturbance.  "  Respiratory:  Negative for cough and shortness of breath.    Cardiovascular:  Negative for chest pain and palpitations.   Gastrointestinal:  Negative for nausea and vomiting.   Genitourinary:  Negative for difficulty urinating and hematuria.   Musculoskeletal:  Positive for myalgias and neck pain. Negative for arthralgias.   Skin:  Negative for rash and wound.   Neurological:  Negative for weakness and headaches.   Psychiatric/Behavioral:  Negative for sleep disturbance. The patient is not nervous/anxious.      Objective:   Pulse 69   Temp 98.5 °F (36.9 °C) (Oral)   Resp 18   Ht 5' 1" (1.549 m)   Wt 57.3 kg (126 lb 4.8 oz)   LMP 11/14/1986   SpO2 98%   BMI 23.86 kg/m²   Physical Exam  Vitals and nursing note reviewed.   Constitutional:       General: She is not in acute distress.     Appearance: She is well-developed. She is not ill-appearing, toxic-appearing or diaphoretic.   HENT:      Head: Normocephalic and atraumatic.      Right Ear: Hearing and external ear normal.      Left Ear: Hearing and external ear normal.      Nose: Nose normal. No rhinorrhea.   Eyes:      General: Lids are normal.      Extraocular Movements: Extraocular movements intact.      Conjunctiva/sclera: Conjunctivae normal.      Pupils: Pupils are equal, round, and reactive to light.   Neck:      Thyroid: No thyroid mass or thyromegaly.      Trachea: Trachea normal.     Cardiovascular:      Rate and Rhythm: Normal rate.      Pulses: Normal pulses.   Pulmonary:      Effort: Pulmonary effort is normal. No respiratory distress.      Breath sounds: Normal breath sounds.   Abdominal:      General: Bowel sounds are normal.      Palpations: Abdomen is soft.   Musculoskeletal:         General: Tenderness present. No deformity or signs of injury. Normal range of motion.      Cervical back: Normal range of motion and neck supple. No edema, erythema, signs of trauma, rigidity, torticollis or crepitus. Pain with movement and muscular tenderness " present. No spinous process tenderness. Normal range of motion.      Right lower leg: No edema.      Left lower leg: No edema.   Lymphadenopathy:      Cervical:      Right cervical: No superficial cervical adenopathy.     Left cervical: No superficial cervical adenopathy.   Skin:     General: Skin is warm and dry.      Capillary Refill: Capillary refill takes less than 2 seconds.      Coloration: Skin is not pale.   Neurological:      General: No focal deficit present.      Mental Status: She is alert and oriented to person, place, and time. She is not disoriented.   Psychiatric:         Attention and Perception: She is attentive.         Mood and Affect: Mood normal. Mood is not anxious or depressed.         Speech: Speech is not rapid and pressured or slurred.         Behavior: Behavior normal. Behavior is not agitated, aggressive or hyperactive. Behavior is cooperative.         Thought Content: Thought content normal. Thought content is not paranoid or delusional. Thought content does not include homicidal or suicidal ideation. Thought content does not include homicidal or suicidal plan.         Cognition and Memory: Memory is not impaired.         Judgment: Judgment normal.         Assessment:     1. Neck pain on left side    2. Encounter for long-term (current) use of medications    3. Hyperlipidemia associated with type 2 diabetes mellitus    4. Hypertension associated with diabetes    5. Hypertriglyceridemia    6. Type 2 diabetes mellitus with hyperglycemia, without long-term current use of insulin    7. Menopause      MDM:   Moderate medical complexity.  Moderate risk.  Total time: 20 minutes.  This includes total time spent on the encounter, which includes face to face time and non-face to face time preparing to see the patient (eg, review of previous medical records, tests), Obtaining and/or reviewing separately obtained history, documenting clinical information in the electronic or other health record,  independently interpreting results (not separately reported)/communicating results to the patient/family/caregiver, and/or care coordination (not separately reported).    I have Reviewed and summarized old records.  I have performed thorough medication reconciliation today and discussed risk and benefits of medications.  I have reviewed labs and discussed with patient.  All questions were answered.  I am requesting old records and will review them once they are available.  Cardiology    I have signed for the following orders AND/OR meds.  Orders Placed This Encounter   Procedures    X-Ray Cervical Spine AP And Lateral     Standing Status:   Future     Number of Occurrences:   1     Standing Expiration Date:   2/7/2025     Order Specific Question:   May the Radiologist modify the order per protocol to meet the clinical needs of the patient?     Answer:   Yes     Order Specific Question:   Release to patient     Answer:   Immediate    DXA Bone Density Axial Skeleton 1 or more sites     Standing Status:   Future     Standing Expiration Date:   2/7/2027     Order Specific Question:   May the Radiologist modify the order per protocol to meet the clinical needs of the patient?     Answer:   Yes     Order Specific Question:   Release to patient     Answer:   Immediate    Microalbumin/Creatinine Ratio, Urine     Standing Status:   Future     Standing Expiration Date:   4/7/2025     Order Specific Question:   Specimen Source     Answer:   Urine    Ambulatory referral/consult to Physical/Occupational Therapy     Standing Status:   Future     Standing Expiration Date:   3/7/2025     Referral Priority:   Routine     Referral Type:   Physical Medicine     Referral Reason:   Specialty Services Required     Number of Visits Requested:   1     Medications Ordered This Encounter   Medications    ibuprofen (ADVIL,MOTRIN) 400 MG tablet     Sig: Take 1 tablet (400 mg total) by mouth every 6 (six) hours as needed for Other.      Dispense:  30 tablet     Refill:  0    omega-3 fatty acids-fish oil 340-1,000 mg Cap     Sig: Take 1 capsule by mouth once daily.     Dispense:  90 capsule     Refill:  3        Follow up in about 6 months (around 8/7/2024), or if symptoms worsen or fail to improve, for Med refills, LAB RESULTS.  Future Appointments       Date Specialty Appt Notes    2/7/2024 Radiology .    2/20/2024 Radiology .          If no improvement in symptoms or symptoms worsen, advised to call/follow-up at clinic or go to ER. Patient voiced understanding and all questions/concerns were addressed.   DISCLAIMER: This note was compiled by using a speech recognition dictation system and therefore please be aware that typographical / speech recognition errors can and do occur.  Please contact me if you see any errors specifically.    Pablito Jean M.D.       Office: [phone] [id number] [place] [address]  FAX: [phone]

## 2024-02-07 NOTE — ASSESSMENT & PLAN NOTE
Continue to implement lifestyle modification with diet and exercise.  May need calcium vitamin-D supplement.  Continue multivitamin.  Update bone density scan.

## 2024-02-07 NOTE — ASSESSMENT & PLAN NOTE
Adding fish oil.  Continue exercise.  Low-fat high-fiber diet.  Continue Repatha.  Follow-up with Cardiology.  Counseled on hyperlipidemia disease course, healthy diet and increased need for exercise.  Please be advised of the risk of cardiovascular disease, increase stroke and heart attack risk with uncontrolled/untreated hyperlipidemia.     Patient voiced understanding and understood the treatment plan. All questions were answered.

## 2024-02-07 NOTE — PROGRESS NOTES
Pt presents today to review labs results. Pt also states she has muscle tension in neck since Monday. Pt states she thinks she slept wrong. Pain 6/10 with movement.

## 2024-02-07 NOTE — PATIENT INSTRUCTIONS
Follow up in about 6 months (around 8/7/2024), or if symptoms worsen or fail to improve, for Med refills, LAB RESULTS.     Dear patient,   As a result of recent federal legislation (The Federal Cures Act), you may receive lab or pathology results from your visit in your [facility] account before your physician is able to contact you. Your physician or their representative will relay the results to you with their recommendations at their soonest availability.     If no improvement in symptoms or symptoms worsen, please be advised to call MD, follow-up at clinic and/or go to ER if becomes severe.    Pablito Jean M.D.        We Offer TELEHEALTH & Same Day Appointments!   Book your Telehealth appointment with me through my nurse or   Clinic appointments on [employer]!    [address] [place] [address]    Office: [phone]   FAX: [phone]    Check out my Facebook Page and Follow Me at: [email or web address]    Check out my website at [employer] by clicking on: [email or web address]    To Schedule appointments online, go to [facility] [employer] [email or web address]

## 2024-02-07 NOTE — ASSESSMENT & PLAN NOTE
Continue current medications.  Blood pressure well controlled.  Counseled on importance of hypertension disease course, I recommend ongoing Education for DASH-diet and exercise.  Counseled on medication regimen importance of treating high blood pressure.  Please be advised of risk of untreated blood pressure as discussed.  Please advised of ER precautions were given for symptoms of hypertensive urgency and emergency.

## 2024-02-07 NOTE — ASSESSMENT & PLAN NOTE
Neck pain consistent with musculoskeletal spasm/strain.  Start ibuprofen low-dose.  Increase hydration with water.  Referral to physical therapy.

## 2024-02-08 ENCOUNTER — CLINICAL SUPPORT (OUTPATIENT)
Dept: REHABILITATION | Facility: HOSPITAL | Age: 79
End: 2024-02-08
Attending: FAMILY MEDICINE
Payer: MEDICARE

## 2024-02-08 DIAGNOSIS — M54.2 NECK PAIN ON LEFT SIDE: ICD-10-CM

## 2024-02-08 DIAGNOSIS — R68.89 DECREASED FUNCTIONAL ACTIVITY TOLERANCE: ICD-10-CM

## 2024-02-08 DIAGNOSIS — R29.898 DECREASED STRENGTH OF UPPER EXTREMITY: ICD-10-CM

## 2024-02-08 DIAGNOSIS — R29.898 DECREASED ROM OF NECK: Primary | ICD-10-CM

## 2024-02-08 PROCEDURE — 97112 NEUROMUSCULAR REEDUCATION: CPT | Mod: HCNC,PN

## 2024-02-08 PROCEDURE — 97161 PT EVAL LOW COMPLEX 20 MIN: CPT | Mod: HCNC,PN

## 2024-02-08 PROCEDURE — 97110 THERAPEUTIC EXERCISES: CPT | Mod: HCNC,PN

## 2024-02-08 NOTE — PLAN OF CARE
[facility]   Physical Therapy Initial Evaluation      Name: Saumya Braun  Clinic Number: [id number]    Therapy Diagnosis:   Encounter Diagnoses   Name Primary?    Neck pain on left side     Decreased ROM of neck Yes    Decreased functional activity tolerance     Decreased strength of upper extremity         Physician: Pablito Jean MD    Physician Orders: PT Eval and Treat  Medical Diagnosis from Referral: M54.2 (ICD-10-CM) - Neck pain on left side   Evaluation Date: 2/8/2024  Authorization Period Expiration: 12/31/24  Plan of Care Expiration: 4/8/24  Progress Note Due: 3/8/34  Date of Surgery: N/A  Visit # / Visits authorized: 1 / 1 Eval   FOTO: 1 / 3    Precautions: HTN, Type 2 Diabetes, Chronic Heart Failure     Time In: 11:00 AM  Time Out: 11:50 AM  Total Billable Time: 50 minutes    Subjective     Date of onset: Monday of this week 2/5/24    History of current condition - Saumya reports: that she has a crick in her neck that started on Monday of this week. She had an xray of the neck and they told her she has age degeneration in the neck. The PCP told her that it could be muscular in nature. She is having most of her pain and discomfort along the left side of her neck and up into the head. Prior to this starting, she was a little more mobile but has been dealing with some discomfort and pain in the neck for awhile. She has not tried any ice or heat. Since this started, she has a lot of discomfort with most activities such as basic ADL's. She is still driving but it does not feel good to turn and move her neck. She was given a prescription for Motrin and she takes it every 6 hours. She does have high blood pressure and type 2 diabetes, but both of these are being treated via medication. No prior surgery to the neck or shoulders. No numbness or tingling going down the arms. Her pain is not constant but when she moves the neck it hurts. She thinks it has improved slightly since  Monday but it still hurts. Has been getting a headache 2-3 times per day but did not have this prior to the onset of symptoms on Monday.    Falls: None    Imaging: Xray of Neck  FINDINGS:  The vertebral bodies demonstrate a normal height.  There is a couple mm anterolisthesis of C4 on C5.  There is equivocal mild disc space narrowing seen at the C6-7 level.  There is evidence for prior median sternotomy.  Prominent multilevel bilateral facet arthropathy noted within the mid to lower cervical spine left greater than the right.  The C1-C2 articulation is within normal limits.     Impression:     As above     Electronically signed by: Mraio Bridges DO  Date:                                            02/07/2024  Time:                                           10:42    Prior Therapy: No  Social History: lives with their spouse  Occupation: Retired - no other hobbies or activities  Prior Level of Function: independent with ADL's  Current Level of Function:  helps with ADL's    Pain:  Current 6/10, worst 8/10, best 0/10   Location: left neck  Description: Aching, Throbbing, Tight, Sharp, and Shooting  Aggravating Factors: turning her head, laying down in the bed is painful - she has been sleeping in an electric recliner  Easing Factors: medication    Patients goals: improve strength and decrease pain     Medical History:   Past Medical History:   Diagnosis Date    Cataract     OU    Chronic heart failure with preserved ejection fraction 3/29/2021    Chronic systolic heart failure     Coronary artery disease involving native coronary artery of native heart without angina pectoris 4/7/2020    Hypertension associated with diabetes 12/8/2015    Ischemic cardiomyopathy     Macrocytosis without anemia 3/26/2021    Mixed hyperlipidemia     Multiple pulmonary nodules 3/26/2020    NSTEMI (non-ST elevated myocardial infarction) 3/26/2020    Post-menopause on HRT (hormone replacement therapy) 9/17/2020    Squamous cell  carcinoma of skin     right scalp and mid nasal bridge       Surgical History:   Saumya Braun  has a past surgical history that includes Hysterectomy; Skin biopsy; Cardiac catheterization (04/02/2020); Coronary artery bypass graft (06/10/2020); and Cataract extraction, bilateral.    Medications:   Saumya has a current medication list which includes the following prescription(s): acetaminophen, albuterol, blood sugar diagnostic, clopidogrel, comirnaty 2023-24 (12y up)(pf), fluticasone propionate, furosemide, ibuprofen, jardiance, lactobacillus rhamnosus gg, metoprolol succinate, multivitamin, omega-3 fatty acids-fish oil, repatha sureclick, and preservision areds.    Allergies:   Review of patient's allergies indicates:   Allergen Reactions    Pravastatin      Brain fog    Rosuvastatin      Dizziness          Objective      Posture: FAIR - slightly forward head with rounded shoulders - very pleasant WF.    Gait: normal joey and step length but patient is unable to move neck to turn and look at her periphery. No AD used.    Cervical Range of Motion:    Degrees Observation Pain   Flexion 20 - + on L     Extension 15 - + on L     Right Rotation 75% limited - + on L     Left Rotation 75% limited - + on L     Right Sidebend 15 - + on L     Left Sidebend 10 -  + on L          Shoulder Active Range of Motion:   Shoulder Right Left   Flexion   180 180   Abduction   180* 180   ER at 90   90 90   IR at 90 70 70       Strength:   Right Left   Flexion  4+/5 4+/5*   Abduction 4+/5 4+/5   Scaption 4+/5 4+/5   Shoulder ER at side 4/5 4/5   Shoulder IR at side  4+/5 4+/5   Middle trap 4/5 4/5   Lower trap 4/5 4/5     Pain = *    Special Tests:    Ligamentous Stability    Sharp-Delmy -     Distraction +   Compression +   Spurlings + L     Cervical Joint Mobility: decreased along the transverse plane from C3-C7      Thoracic Joint Mobility: decreased moving into extension    Palpation: mild to moderate TTP along the cervical  paraspinals on the L     Intake Outcome Measure for FOTO Neck Survey    Therapist reviewed FOTO scores for Saumya Braun on 2/8/2024.   FOTO report - see Media section or FOTO account episode details.    Intake Score: 54%     Treatment     Total Treatment time (time-based codes) separate from Evaluation: 16 minutes     Saumya received the treatments listed below:      therapeutic exercises to develop strength, endurance, ROM, and flexibility for 8 minutes including:    Supine Cervical Rotations x10 reps  Supine Cervical Nods x10 reps    neuromuscular re-education activities to improve: Balance, Coordination, Kinesthetic, Sense, Proprioception, and Posture for 8 minutes. The following activities were included:    Scap Retractions x5 reps B (3s holds)  Seated Chin Tucks x5 reps B (3s holds)      Patient received a hot pack for 5 minutes to the cervical musculature to assist with pain relief and tissue extensibility. No adverse reactions noted. Patient supine for comfort.    Patient Education and Home Exercises     Education provided:   - Home Exercise Program Administration and Review  - Post Exercise Soreness  - Maintaining a pain free range of motion with all activities  - Anatomy/Physiology of the Neck and the surrounding musculature  - Cervical Roll - Ralph  (trial basis)    Written Home Exercises Provided: yes. Exercises were reviewed and Saumya was able to demonstrate them prior to the end of the session.  Saumya demonstrated good  understanding of the education provided. See EMR under Patient Instructions for exercises provided during therapy sessions.    Assessment     Saumya is a 78 y.o. female referred to outpatient Physical Therapy with a medical diagnosis of Neck pain on left side. Patient presents with decreased cervical range of motion, decreased UE strength, poor posture, and decreased functional mobility overall. Upon assessment, there is both muscular and joint limitations at the cervical  spine. Pain was reduced slightly with manual cervical distraction. Will progress treatment plan accordingly moving forward with emphasis on joint mobility and strength.    Patient prognosis is Fair.   Patient will benefit from skilled outpatient Physical Therapy to address the deficits stated above and in the chart below, provide patient /family education, and to maximize patientt's level of independence.     Plan of care discussed with patient: Yes  Patient's spiritual, cultural and educational needs considered and patient is agreeable to the plan of care and goals as stated below:     Anticipated Barriers for therapy: none stated    Medical Necessity is demonstrated by the following  History  Co-morbidities and personal factors that may impact the plan of care [] LOW: no personal factors / co-morbidities  [] MODERATE: 1-2 personal factors / co-morbidities  [x] HIGH: 3+ personal factors / co-morbidities    Moderate / High Support Documentation:   Co-morbidities affecting plan of care: HTN, Type 2 Diabetes, Chronic Heart Failure    Personal Factors:   no deficits     Examination  Body Structures and Functions, activity limitations and participation restrictions that may impact the plan of care [x] LOW: addressing 1-2 elements  [] MODERATE: 3+ elements  [] HIGH: 4+ elements (please support below)    Moderate / High Support Documentation: N/A     Clinical Presentation [x] LOW: stable  [] MODERATE: Evolving  [] HIGH: Unstable     Decision Making/ Complexity Score: low       Goals:  Short Term Goals: 4 weeks   - Patient will be able to sleep in her regular bed with minimal to no provocation of symptoms for improved ability to rest and recover.  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  - Patient will demonstrate improved UE strength, especially into shoulder ER by at least 1/2 grade via MMT for increased stability and support with  functional tasks.  - Patient will demonstrate increased cervical range of motion, especially into all planes of movement by at least 5 degrees for increased ability to perform daily duties.    Long Term Goals: 8 weeks   - Patient will demonstrate improved UE strength, especially into shoulder scpation by at least 1/2 grade via MMT for increased stability and support with functional tasks.  - Patient will demonstrate increased cervical range of motion, especially into all planes of movement by at least 10 degrees for increased ability to perform daily duties.  - Patient will demonstrate independence with Home Exercise Program for continued improvements outside the clinical setting.  - Patient will demonstrate improved FOTO score that is greater than or equal to the predicted value for increased ability to perform ADL's.    Plan     Plan of care Certification: 2/8/2024 to 4/8/24.    Outpatient Physical Therapy 2 times weekly for 8 weeks to include the following interventions: Aquatic Therapy, Cervical/Lumbar Traction, Electrical Stimulation IFC/TENS/PREMOD, Gait Training, Manual Therapy, Moist Heat/ Ice, Neuromuscular Re-ed, Patient Education, Self Care, Therapeutic Activities, Therapeutic Exercise, Ultrasound, and Dry Needling (by a certified therapist).     This patient CAN be treated by a PTA.    Kalie Zarco, PT, DPT, Cert. DN        Physician's Signature: _________________________________________ Date: ________________

## 2024-02-15 NOTE — PROGRESS NOTES
[facility]   Physical Therapy Treatment Note      Name: Saumya Braun  Clinic Number: [id number]    Therapy Diagnosis:   Encounter Diagnoses   Name Primary?    Decreased ROM of neck Yes    Decreased functional activity tolerance     Decreased strength of upper extremity      Physician: Pablito Jean MD    Visit Date: 2/16/2024    Physician Orders: PT Eval and Treat  Medical Diagnosis from Referral: M54.2 (ICD-10-CM) - Neck pain on left side   Evaluation Date: 2/8/2024  Authorization Period Expiration: 5/16/24  Plan of Care Expiration: 4/8/24  Progress Note Due: 3/8/34  Date of Surgery: N/A  Visit # / Visits authorized: 1 / 12 (1 / 1 Eval)  FOTO: 1 / 3     Precautions: HTN, Type 2 Diabetes, Chronic Heart Failure      Time In: 10:02 AM  Time Out: 10:55 AM  Total Billable Time: 53 minutes    PTA Visit #: 0/5     Subjective     Patient reports: that she is feeling much better in regard to her neck and has felt better with her cervical pillow. Still having some pain when moving the neck in certain directions.    She was compliant with home exercise program.  Response to previous treatment: no adverse reactions  Functional change: in progress - first follow up appointment    Pain: 4/10  Location: left neck      Objective      Objective Measures updated at progress report unless specified.     Treatment     Saumya received the treatments listed below:      Therapeutic Exercises to develop strength, endurance, ROM, and flexibility for 25 minutes including:     Patient Education and Home Exercise Program Review  UBE x2 min each forward and backward  Standing Wall Slides x10 reps B  Supine Cervical Rotations 2x10 reps - over towel roll  Supine Cervical Nods 2x10 reps - over towel roll     Neuromuscular Re-education activities to improve: Balance, Coordination, Kinesthetic, Sense, Proprioception, and Posture for 10 minutes. The following activities were included:     Seated Scap Retractions  2x10 reps B (3s holds)  Supine Chin Tucks 2x10 reps B (3s holds)  Supine Horizontals Shoulder Abduction x15 reps + YTB - emphasis on scap squeeze    Therapeutic Activities to improve functional performance for 8 minutes, including:    Supine Shoulder Extensions + YTB B  Standing Shoulder Rows 2x10 reps + YTB B       Manual Therapy Techniques: Manual traction, Myofacial release, and Soft tissue Mobilization were applied to the: Cervical Spine for 10 minutes, including:    Supine Gentle Cervical Distraction  STM to the cervical paraspinals and Upper Traps B  Supine Suboccipital Release B    Patient received a hot pack for 8 minutes to the cervical musculature to assist with pain relief and tissue extensibility. No adverse reactions noted. Patient supine for comfort.      Supervised Modalities after being cleared for contradictions: IFC Electrical Stimulation:  Saumya received IFC Electrical Stimulation for pain control applied to the. Pt received stimulation at 100% scan for  minutes. Saumya tolderated treatment well without any adverse effects.        Patient Education and Home Exercises       Education provided:   - Home Exercise Program Review  - Post Exercise Soreness  - Maintaining a pain free range of motion with all activities  - Anatomy/Physiology of the Neck and the surrounding musculature    Written Home Exercises Provided: Patient instructed to cont prior HEP. Exercises were reviewed and Saumya was able to demonstrate them prior to the end of the session.  Saumya demonstrated good  understanding of the education provided. See Electronic Medical Record under Patient Instructions for exercises provided during therapy sessions    Assessment     Emphasis on cervical mobility and proper posture with all activities. Cervical rotation to the left remains difficult as well as when moving towards end range extension; however, the patient has made improvements since beginning therapy. Manual therapy techniques  were performed in order to assist with improving joint mobility at the cervical spine. Encouraged patient to continue with Home Exercise Program - patient verbalized understanding.    Saumya Is progressing well towards her goals.   Patient prognosis is Fair.     Patient will continue to benefit from skilled outpatient physical therapy to address the deficits listed in the problem list box on initial evaluation, provide pt/family education and to maximize pt's level of independence in the home and community environment.     Patient's spiritual, cultural and educational needs considered and pt agreeable to plan of care and goals.     Anticipated barriers to physical therapy: none stated    Goals:   Short Term Goals: 4 weeks  (progressing, not met)  - Patient will be able to sleep in her regular bed with minimal to no provocation of symptoms for improved ability to rest and recover.  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  - Patient will demonstrate improved UE strength, especially into shoulder ER by at least 1/2 grade via MMT for increased stability and support with functional tasks.  - Patient will demonstrate increased cervical range of motion, especially into all planes of movement by at least 5 degrees for increased ability to perform daily duties.     Long Term Goals: 8 weeks (progressing, not met)  - Patient will demonstrate improved UE strength, especially into shoulder scpation by at least 1/2 grade via MMT for increased stability and support with functional tasks.  - Patient will demonstrate increased cervical range of motion, especially into all planes of movement by at least 10 degrees for increased ability to perform daily duties.  - Patient will demonstrate independence with Home Exercise Program for continued improvements outside the clinical setting.  - Patient will demonstrate improved FOTO score that is greater  than or equal to the predicted value for increased ability to perform ADL's.    Plan     Continue with established POC for improved functional mobility overall.    Kalie Zarco, PT, DPT, Cert. DN

## 2024-02-16 ENCOUNTER — CLINICAL SUPPORT (OUTPATIENT)
Dept: REHABILITATION | Facility: HOSPITAL | Age: 79
End: 2024-02-16
Payer: MEDICARE

## 2024-02-16 DIAGNOSIS — R29.898 DECREASED ROM OF NECK: Primary | ICD-10-CM

## 2024-02-16 DIAGNOSIS — R29.898 DECREASED STRENGTH OF UPPER EXTREMITY: ICD-10-CM

## 2024-02-16 DIAGNOSIS — R68.89 DECREASED FUNCTIONAL ACTIVITY TOLERANCE: ICD-10-CM

## 2024-02-16 PROCEDURE — 97530 THERAPEUTIC ACTIVITIES: CPT | Mod: HCNC,PN

## 2024-02-16 PROCEDURE — 97112 NEUROMUSCULAR REEDUCATION: CPT | Mod: HCNC,PN

## 2024-02-16 PROCEDURE — 97110 THERAPEUTIC EXERCISES: CPT | Mod: HCNC,PN

## 2024-02-19 ENCOUNTER — CLINICAL SUPPORT (OUTPATIENT)
Dept: REHABILITATION | Facility: HOSPITAL | Age: 79
End: 2024-02-19
Payer: MEDICARE

## 2024-02-19 DIAGNOSIS — R29.898 DECREASED STRENGTH OF UPPER EXTREMITY: ICD-10-CM

## 2024-02-19 DIAGNOSIS — R68.89 DECREASED FUNCTIONAL ACTIVITY TOLERANCE: ICD-10-CM

## 2024-02-19 DIAGNOSIS — R29.898 DECREASED ROM OF NECK: Primary | ICD-10-CM

## 2024-02-19 PROCEDURE — 97530 THERAPEUTIC ACTIVITIES: CPT | Mod: HCNC,PN

## 2024-02-19 PROCEDURE — 97112 NEUROMUSCULAR REEDUCATION: CPT | Mod: HCNC,PN

## 2024-02-19 PROCEDURE — 97110 THERAPEUTIC EXERCISES: CPT | Mod: HCNC,PN

## 2024-02-19 NOTE — PROGRESS NOTES
[facility]   Physical Therapy Treatment Note      Name: Saumya Braun  Clinic Number: [id number]    Therapy Diagnosis:   Encounter Diagnoses   Name Primary?    Decreased ROM of neck Yes    Decreased functional activity tolerance     Decreased strength of upper extremity      Physician: Pablito Jean MD    Visit Date: 2/19/2024    Physician Orders: PT Eval and Treat  Medical Diagnosis from Referral: M54.2 (ICD-10-CM) - Neck pain on left side   Evaluation Date: 2/8/2024  Authorization Period Expiration: 5/16/24  Plan of Care Expiration: 4/8/24  Progress Note Due: 3/8/34  Date of Surgery: N/A  Visit # / Visits authorized: 2 / 12 (1 / 1 Eval)  FOTO: 1 / 3     Precautions: HTN, Type 2 Diabetes, Chronic Heart Failure      Time In: 11:02 AM  Time Out: 11:55 AM  Total Billable Time: 53 minutes    PTA Visit #: 0/5     Subjective     Patient reports: that she is having a little more stiffness in the neck this morning. Had some soreness after last session but did okay overall.    She was compliant with home exercise program.  Response to previous treatment: mild soreness that improved over time  Functional change: in progress     Pain: 4/10  Location: left neck      Objective      Objective Measures updated at progress report unless specified.     Treatment     Saumya received the treatments listed below:      Therapeutic Exercises to develop strength, endurance, ROM, and flexibility for 25 minutes including:     Patient Education and Home Exercise Program Review  UBE x2 min each forward and backward  Standing Wall Slides x15 reps B  Supine Cervical Rotations 2x10 reps - over towel roll  Supine Cervical Nods 2x10 reps - over towel roll  Supine Shoulder Flexion x10 reps + #1 Dowel     Neuromuscular Re-education activities to improve: Balance, Coordination, Kinesthetic, Sense, Proprioception, and Posture for 10 minutes. The following activities were included:     Seated Scap Retractions 3x10  reps B (3s holds)  Supine Chin Tucks 2x10 reps B (3s holds)  Supine Horizontals Shoulder Abduction x15 reps + YTB - emphasis on scap squeeze    Therapeutic Activities to improve functional performance for 8 minutes, including:    Standing Shoulder Extensions 2x10 reps + YTB B  Standing Shoulder Rows 2x10 reps + YTB B       Manual Therapy Techniques: Manual traction, Myofacial release, and Soft tissue Mobilization were applied to the: Cervical Spine for 10 minutes, including:    Supine Gentle Cervical Distraction  STM to the cervical paraspinals and Upper Traps B  Supine Suboccipital Release B    Patient received a hot pack for 8 minutes to the cervical musculature to assist with pain relief and tissue extensibility. No adverse reactions noted. Patient supine for comfort.      Supervised Modalities after being cleared for contradictions: IFC Electrical Stimulation:  Saumya received IFC Electrical Stimulation for pain control applied to the. Pt received stimulation at 100% scan for  minutes. Saumya tolderated treatment well without any adverse effects.      Patient Education and Home Exercises       Education provided:   - Home Exercise Program Review  - Post Exercise Soreness  - Maintaining a pain free range of motion with all activities  - Anatomy/Physiology of the Neck and the surrounding musculature    Written Home Exercises Provided: Patient instructed to cont prior HEP. Exercises were reviewed and Saumya was able to demonstrate them prior to the end of the session.  Saumya demonstrated good  understanding of the education provided. See Electronic Medical Record under Patient Instructions for exercises provided during therapy sessions    Assessment     Patient continues to have some discomfort and pain in the cervical spine. Emphasis placed on joint mobility and periscapular strengthening for increased stability with functional tasks. L sided cervical rotation remains difficult and painful for the  patient.    Saumya Is progressing well towards her goals.   Patient prognosis is Fair.     Patient will continue to benefit from skilled outpatient physical therapy to address the deficits listed in the problem list box on initial evaluation, provide pt/family education and to maximize p t's level of independence in the home and community environment.     Patient's spiritual, cultural and educational needs considered and pt agreeable to plan of care and goals.     Anticipated barriers to physical therapy: none stated    Goals:   Short Term Goals: 4 weeks  (progressing, not met)  - Patient will be able to sleep in her regular bed with minimal to no provocation of symptoms for improved ability to rest and recover.  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  - Patient will demonstrate improved UE strength, especially into shoulder ER by at least 1/2 grade via MMT for increased stability and support with functional tasks.  - Patient will demonstrate increased cervical range of motion, especially into all planes of movement by at least 5 degrees for increased ability to perform daily duties.     Long Term Goals: 8 weeks (progressing, not met)  - Patient will demonstrate improved UE strength, especially into shoulder scpation by at least 1/2 grade via MMT for increased stability and support with functional tasks.  - Patient will demonstrate increased cervical range of motion, especially into all planes of movement by at least 10 degrees for increased ability to perform daily duties.  - Patient will demonstrate independence with Home Exercise Program for continued improvements outside the clinical setting.  - Patient will demonstrate improved FOTO score that is greater than or equal to the predicted value for increased ability to perform ADL's.    Plan     Continue with established POC for improved functional mobility overall.    Kalie  Haroldo, PT, DPT, Cert. DN

## 2024-02-20 ENCOUNTER — HOSPITAL ENCOUNTER (OUTPATIENT)
Dept: RADIOLOGY | Facility: HOSPITAL | Age: 79
Discharge: HOME | End: 2024-02-20
Attending: FAMILY MEDICINE
Payer: MEDICARE

## 2024-02-20 DIAGNOSIS — Z78.0 MENOPAUSE: ICD-10-CM

## 2024-02-20 PROCEDURE — 77080 DXA BONE DENSITY AXIAL: CPT | Mod: 26,HCNC,, | Performed by: RADIOLOGY

## 2024-02-20 PROCEDURE — 77080 DXA BONE DENSITY AXIAL: CPT | Mod: TC,HCNC,PO

## 2024-02-20 NOTE — PROGRESS NOTES
Please call the patient with results. [phone]   .The bone density test (DEXA scan) shows osteopenia, known as weakening of the bones. You need to be on Calcium and Vitamin D supplementation and also start using weight bearing exercises to help reduce the risk of a fracture.  Also, please start an over-the-counter vitamin-D/calcium supplementation.  Ask the patient to recheck the DEXA scan in 2 years.

## 2024-02-20 NOTE — PROGRESS NOTES
[facility]   Physical Therapy Treatment Note      Name: Saumya Braun  Clinic Number: [id number]    Therapy Diagnosis:   Encounter Diagnoses   Name Primary?    Decreased ROM of neck Yes    Decreased functional activity tolerance     Decreased strength of upper extremity      Physician: Pablito Jean MD    Visit Date: 2/22/2024    Physician Orders: PT Eval and Treat  Medical Diagnosis from Referral: M54.2 (ICD-10-CM) - Neck pain on left side   Evaluation Date: 2/8/2024  Authorization Period Expiration: 5/16/24  Plan of Care Expiration: 4/8/24  Progress Note Due: 3/8/24  Date of Surgery: N/A  Visit # / Visits authorized: 3 / 12 (1 / 1 Eval)  FOTO: 1 / 3     Precautions: HTN, Type 2 Diabetes, Chronic Heart Failure      Time In: 12:30 PM  Time Out: 1:23 PM  Total Billable Time: 28 minutes    PTA Visit #: 0/5     Subjective     Patient reports: that her neck was doing better up until yesterday when she went to change her sheets. At night the neck continues to get achy.    She was compliant with home exercise program.  Response to previous treatment: mild soreness that improved over time  Functional change: in progress     Pain: 4/10  Location: left neck      Objective      Objective Measures updated at progress report unless specified.     Treatment     Saumya received the treatments listed below:      Therapeutic Exercises to develop strength, endurance, ROM, and flexibility for 25 minutes including:     Patient Education and Home Exercise Program Review  UBE x2 min each forward and backward  Standing Wall Slides x15 reps B  Supine Cervical Rotations 2x10 reps - over towel roll  Supine Cervical Nods 2x10 reps - over towel roll  Supine Shoulder Flexion x10 reps + #1 Dowel     Neuromuscular Re-education activities to improve: Balance, Coordination, Kinesthetic, Sense, Proprioception, and Posture for 10 minutes. The following activities were included:     Seated Scap Retractions 3x10  reps B (3s holds)  Supine and Seated Chin Tucks 2x10 reps B (3s holds)  Supine Horizontals Shoulder Abduction x15 reps + YTB - emphasis on scap squeeze    Therapeutic Activities to improve functional performance for 8 minutes, including:    Standing Shoulder Extensions 2x10 reps + YTB B  Standing Shoulder Rows 2x10 reps + RTB B       Manual Therapy Techniques: Manual traction, Myofacial release, and Soft tissue Mobilization were applied to the: Cervical Spine for 10 minutes, including:    Supine Gentle Cervical Distraction  STM to the cervical paraspinals and Upper Traps B  Supine Suboccipital Release B    Patient received a hot pack for 8 minutes to the cervical musculature to assist with pain relief and tissue extensibility. No adverse reactions noted. Patient supine for comfort.    Supervised Modalities after being cleared for contradictions: IFC Electrical Stimulation:  Saumya received IFC Electrical Stimulation for pain control applied to the cervical musculature. Pt received stimulation at 100% scan for 8 minutes. Saumya tolderated treatment well without any adverse effects. Patient supine for comfort and LE's over wedge.    Patient Education and Home Exercises       Education provided:   - Home Exercise Program Review  - Post Exercise Soreness  - Maintaining a pain free range of motion with all activities  - Anatomy/Physiology of the Neck and the surrounding musculature    Written Home Exercises Provided: Patient instructed to cont prior HEP. Exercises were reviewed and Saumya was able to demonstrate them prior to the end of the session.  Saumya demonstrated good  understanding of the education provided. See Electronic Medical Record under Patient Instructions for exercises provided during therapy sessions    Assessment     Cervical mobility remains limited and painful. Range of motion does improve after repeated movements into each direction. Manual therapy techniques continue to feel beneficial.  Electrical stimulation was applied to the cervical musculature post treatment session - no adverse reactions observed. Will progress patient next session as able.    Saumya Is progressing well towards her goals.   Patient prognosis is Fair.     Patient will continue to benefit from skilled outpatient physical therapy to address the deficits listed in the problem list box on initial evaluation, provide pt/family education and to maximize p t's level of independence in the home and community environment.     Patient's spiritual, cultural and educational needs considered and pt agreeable to plan of care and goals.     Anticipated barriers to physical therapy: none stated    Goals:   Short Term Goals: 4 weeks  (progressing, not met)  - Patient will be able to sleep in her regular bed with minimal to no provocation of symptoms for improved ability to rest and recover.  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  - Patient will demonstrate improved UE strength, especially into shoulder ER by at least 1/2 grade via MMT for increased stability and support with functional tasks.  - Patient will demonstrate increased cervical range of motion, especially into all planes of movement by at least 5 degrees for increased ability to perform daily duties.     Long Term Goals: 8 weeks (progressing, not met)  - Patient will demonstrate improved UE strength, especially into shoulder scpation by at least 1/2 grade via MMT for increased stability and support with functional tasks.  - Patient will demonstrate increased cervical range of motion, especially into all planes of movement by at least 10 degrees for increased ability to perform daily duties.  - Patient will demonstrate independence with Home Exercise Program for continued improvements outside the clinical setting.  - Patient will demonstrate improved FOTO score that is greater than or equal to  the predicted value for increased ability to perform ADL's.    Plan     Continue with established POC for improved functional mobility overall.    *A portion of this treatment session is provided with the assistance of a skilled rehbailitation technician under the supervision of a licensed physical therapist.    Kalie Zarco PT, DPT, Cert. DN

## 2024-02-22 ENCOUNTER — CLINICAL SUPPORT (OUTPATIENT)
Dept: REHABILITATION | Facility: HOSPITAL | Age: 79
End: 2024-02-22
Payer: MEDICARE

## 2024-02-22 DIAGNOSIS — R29.898 DECREASED STRENGTH OF UPPER EXTREMITY: ICD-10-CM

## 2024-02-22 DIAGNOSIS — R29.898 DECREASED ROM OF NECK: Primary | ICD-10-CM

## 2024-02-22 DIAGNOSIS — R68.89 DECREASED FUNCTIONAL ACTIVITY TOLERANCE: ICD-10-CM

## 2024-02-22 PROCEDURE — 97014 ELECTRIC STIMULATION THERAPY: CPT | Mod: HCNC,PN

## 2024-02-22 PROCEDURE — 97112 NEUROMUSCULAR REEDUCATION: CPT | Mod: HCNC,PN

## 2024-02-22 PROCEDURE — 97530 THERAPEUTIC ACTIVITIES: CPT | Mod: HCNC,PN

## 2024-02-23 NOTE — PROGRESS NOTES
[facility]   Physical Therapy Treatment Note      Name: Saumya Braun  Clinic Number: [id number]    Therapy Diagnosis:   Encounter Diagnoses   Name Primary?    Decreased ROM of neck Yes    Decreased functional activity tolerance     Decreased strength of upper extremity        Physician: Pablito Jena MD    Visit Date: 2/26/2024    Physician Orders: PT Eval and Treat  Medical Diagnosis from Referral: M54.2 (ICD-10-CM) - Neck pain on left side   Evaluation Date: 2/8/2024  Authorization Period Expiration: 5/16/24  Plan of Care Expiration: 4/8/24  Progress Note Due: 3/8/24  Date of Surgery: N/A  Visit # / Visits authorized: 4 / 12 (1 / 1 Eval)  FOTO: 2 / 3     Precautions: HTN, Type 2 Diabetes, Chronic Heart Failure      Time In: 11:00 AM  Time Out: 11:53 AM  Total Billable Time: 53 minutes    PTA Visit #: 0/5     Subjective     Patient reports: that she is feeling pretty good today. Thought last session helped a lot.    She was compliant with home exercise program.  Response to previous treatment: no adverse reactions reported  Functional change: in progress     Pain: 2/10  Location: left neck      Objective      Objective Measures updated at progress report unless specified.     FOTO: 20    Treatment     Saumya received the treatments listed below:      Therapeutic Exercises to develop strength, endurance, ROM, and flexibility for 25 minutes including:     Patient Education and Home Exercise Program Review  UBE x2 min each forward and backward  Standing Wall Slides x20 reps B  Supine Cervical Rotations 2x10 reps - over towel roll  Supine Cervical Nods 2x10 reps - over towel roll  Supine Shoulder Flexion x10 reps + #1 Dowel     Neuromuscular Re-education activities to improve: Balance, Coordination, Kinesthetic, Sense, Proprioception, and Posture for 10 minutes. The following activities were included:     Seated Scap Retractions 3x10 reps B (3s holds)  Supine and Seated Chin Tucks  2x10 reps B (3s holds)  Standing cervical Tucks against ball on wall x20 reps  Supine Horizontals Shoulder Abduction x20 reps + YTB - emphasis on scap squeeze  =  Therapeutic Activities to improve functional performance for 8 minutes, including:    Standing Shoulder Extensions 3x10 reps + RTB B  Standing Shoulder Rows 3x10 reps + RTB B       Manual Therapy Techniques: Manual traction, Myofacial release, and Soft tissue Mobilization were applied to the: Cervical Spine for 10 minutes, including:    Supine Gentle Cervical Distraction  STM to the cervical paraspinals and Upper Traps B  Supine Suboccipital Release B    Patient received a hot pack for 8 minutes to the cervical musculature to assist with pain relief and tissue extensibility. No adverse reactions noted. Patient supine for comfort.    Supervised Modalities after being cleared for contradictions: IFC Electrical Stimulation:  Saumya received IFC Electrical Stimulation for pain control applied to the cervical musculature. Pt received stimulation at 100% scan for 8 minutes. Saumya tolderated treatment well without any adverse effects. Patient supine for comfort and LE's over wedge.    Patient Education and Home Exercises       Education provided:   - Home Exercise Program Review  - Post Exercise Soreness  - Maintaining a pain free range of motion with all activities  - Anatomy/Physiology of the Neck and the surrounding musculature    Written Home Exercises Provided: Patient instructed to cont prior HEP. Exercises were reviewed and Saumya was able to demonstrate them prior to the end of the session.  Saumya demonstrated good  understanding of the education provided. See Electronic Medical Record under Patient Instructions for exercises provided during therapy sessions    Assessment     Emphasis on periscapular strengthening and stability with UE movements. Cervical joint mobility was also a main focal point due to limitations present. Able to progress reps  and or intensity of some exercises without complication. IFC electrical stimulation was utilized post treatment session to assist with pain relief and tissue extensibility.    Saumya Is progressing well towards her goals.   Patient prognosis is Fair.     Patient will continue to benefit from skilled outpatient physical therapy to address the deficits listed in the problem list box on initial evaluation, provide pt/family education and to maximize p t's level of independence in the home and community environment.     Patient's spiritual, cultural and educational needs considered and pt agreeable to plan of care and goals.     Anticipated barriers to physical therapy: none stated    Goals:   Short Term Goals: 4 weeks  (progressing, not met)  - Patient will be able to sleep in her regular bed with minimal to no provocation of symptoms for improved ability to rest and recover.  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  - Patient will demonstrate improved UE strength, especially into shoulder ER by at least 1/2 grade via MMT for increased stability and support with functional tasks.  - Patient will demonstrate increased cervical range of motion, especially into all planes of movement by at least 5 degrees for increased ability to perform daily duties.     Long Term Goals: 8 weeks (progressing, not met)  - Patient will demonstrate improved UE strength, especially into shoulder scpation by at least 1/2 grade via MMT for increased stability and support with functional tasks.  - Patient will demonstrate increased cervical range of motion, especially into all planes of movement by at least 10 degrees for increased ability to perform daily duties.  - Patient will demonstrate independence with Home Exercise Program for continued improvements outside the clinical setting.  - Patient will demonstrate improved FOTO score that is greater than or  equal to the predicted value for increased ability to perform ADL's.    Plan     Continue with established POC for improved functional mobility overall.    Kalie Zarco, PT, DPT, Cert. DN

## 2024-02-26 ENCOUNTER — CLINICAL SUPPORT (OUTPATIENT)
Dept: REHABILITATION | Facility: HOSPITAL | Age: 79
End: 2024-02-26
Payer: MEDICARE

## 2024-02-26 DIAGNOSIS — R68.89 DECREASED FUNCTIONAL ACTIVITY TOLERANCE: ICD-10-CM

## 2024-02-26 DIAGNOSIS — R29.898 DECREASED ROM OF NECK: Primary | ICD-10-CM

## 2024-02-26 DIAGNOSIS — R29.898 DECREASED STRENGTH OF UPPER EXTREMITY: ICD-10-CM

## 2024-02-26 PROCEDURE — 97110 THERAPEUTIC EXERCISES: CPT | Mod: HCNC,PN

## 2024-02-26 PROCEDURE — 97112 NEUROMUSCULAR REEDUCATION: CPT | Mod: HCNC,PN

## 2024-02-26 PROCEDURE — 97530 THERAPEUTIC ACTIVITIES: CPT | Mod: HCNC,PN

## 2024-02-26 PROCEDURE — 97014 ELECTRIC STIMULATION THERAPY: CPT | Mod: HCNC,PN

## 2024-02-28 NOTE — PROGRESS NOTES
[facility]   Physical Therapy Treatment Note      Name: Saumya Braun  Clinic Number: [id number]    Therapy Diagnosis:   Encounter Diagnoses   Name Primary?    Decreased ROM of neck Yes    Decreased functional activity tolerance     Decreased strength of upper extremity      Physician: Pablito Jean MD    Visit Date: 2/29/2024    Physician Orders: PT Eval and Treat  Medical Diagnosis from Referral: M54.2 (ICD-10-CM) - Neck pain on left side   Evaluation Date: 2/8/2024  Authorization Period Expiration: 5/16/24  Plan of Care Expiration: 4/8/24  Progress Note Due: 3/8/24  Date of Surgery: N/A  Visit # / Visits authorized: 5 / 12 (1 / 1 Eval)  FOTO: 2 / 3     Precautions: HTN, Type 2 Diabetes, Chronic Heart Failure      Time In: 12:30 PM  Time Out: 1:23 PM  Total Billable Time: 53 minutes    PTA Visit #: 0/5     Subjective     Patient reports: that she only has pain when moving her neck a certain way. Felt good after last session.     She was compliant with home exercise program.  Response to previous treatment: no adverse reactions reported  Functional change: in progress     Pain: 2/10  Location: left neck      Objective      Objective Measures updated at progress report unless specified.     Treatment     Saumya received the treatments listed below:      Therapeutic Exercises to develop strength, endurance, ROM, and flexibility for 25 minutes including:     Patient Education and Home Exercise Program Review  UBE x2 min each forward and backward  Standing Wall Slides x20 reps B  Supine Cervical Rotations 2x10 reps - over towel roll  Supine Cervical Nods 2x10 reps - over towel roll  Supine Shoulder Flexion 2x10 reps + #1 Dowel  Standing Ball Walk Ups x5 reps      Neuromuscular Re-education activities to improve: Balance, Coordination, Kinesthetic, Sense, Proprioception, and Posture for 10 minutes. The following activities were included:     Seated Scap Retractions 3x10 reps B (3s  holds)  Supine and Seated Chin Tucks 2x10 reps B (3s holds)  Standing Cervical Tucks against ball on wall x10 reps  Supine and Seated Horizontals Shoulder Abduction x20 reps + YTB - emphasis on scap squeeze (10 rep seated)    Therapeutic Activities to improve functional performance for 8 minutes, including:    Standing Shoulder Extensions 3x10 reps + RTB B  Standing Shoulder High Rows 3x10 reps + RTB B       Manual Therapy Techniques: Manual traction, Myofacial release, and Soft tissue Mobilization were applied to the: Cervical Spine for 10 minutes, including:    Supine Gentle Cervical Distraction  STM to the cervical paraspinals and Upper Traps B  Supine Suboccipital Release B    Patient received a hot pack for 8 minutes to the cervical musculature to assist with pain relief and tissue extensibility. No adverse reactions noted. Patient supine for comfort.    Supervised Modalities after being cleared for contradictions: IFC Electrical Stimulation:  Saumya received IFC Electrical Stimulation for pain control applied to the cervical musculature. Pt received stimulation at 100% scan for 8 minutes. Saumya tolderated treatment well without any adverse effects. Patient supine for comfort and LE's over wedge.    Patient Education and Home Exercises       Education provided:   - Home Exercise Program Review  - Post Exercise Soreness  - Maintaining a pain free range of motion with all activities  - Anatomy/Physiology of the Neck and the surrounding musculature    Written Home Exercises Provided: Patient instructed to cont prior HEP. Exercises were reviewed and Saumya was able to demonstrate them prior to the end of the session.  Saumya demonstrated good  understanding of the education provided. See Electronic Medical Record under Patient Instructions for exercises provided during therapy sessions    Assessment     Patient continues to respond well to therapy. Remains limited when moving into cervical rotation  bilaterally. Introduced high shoulder rows today with greater emphasis on periscapular stability and rhomboid engagement. Ball walk ups were also introduced with emphasis on cervical flexion and extension with overhead movements. Encouraged a pain free range of motion with all activities.     Saumya Is progressing well towards her goals.   Patient prognosis is Fair.     Patient will continue to benefit from skilled outpatient physical therapy to address the deficits listed in the problem list box on initial evaluation, provide pt/family education and to maximize p t's level of independence in the home and community environment.     Patient's spiritual, cultural and educational needs considered and pt agreeable to plan of care and goals.     Anticipated barriers to physical therapy: none stated    Goals:   Short Term Goals: 4 weeks  (progressing, not met)  - Patient will be able to sleep in her regular bed with minimal to no provocation of symptoms for improved ability to rest and recover.  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  - Patient will demonstrate improved UE strength, especially into shoulder ER by at least 1/2 grade via MMT for increased stability and support with functional tasks.  - Patient will demonstrate increased cervical range of motion, especially into all planes of movement by at least 5 degrees for increased ability to perform daily duties.     Long Term Goals: 8 weeks (progressing, not met)  - Patient will demonstrate improved UE strength, especially into shoulder scpation by at least 1/2 grade via MMT for increased stability and support with functional tasks.  - Patient will demonstrate increased cervical range of motion, especially into all planes of movement by at least 10 degrees for increased ability to perform daily duties.  - Patient will demonstrate independence with Home Exercise Program for  continued improvements outside the clinical setting.  - Patient will demonstrate improved FOTO score that is greater than or equal to the predicted value for increased ability to perform ADL's.    Plan     Continue with established POC for improved functional mobility overall.    Kalie Zarco, PT, DPT, Cert. DN

## 2024-02-29 ENCOUNTER — CLINICAL SUPPORT (OUTPATIENT)
Dept: REHABILITATION | Facility: HOSPITAL | Age: 79
End: 2024-02-29
Payer: MEDICARE

## 2024-02-29 DIAGNOSIS — R29.898 DECREASED STRENGTH OF UPPER EXTREMITY: ICD-10-CM

## 2024-02-29 DIAGNOSIS — R29.898 DECREASED ROM OF NECK: Primary | ICD-10-CM

## 2024-02-29 DIAGNOSIS — R68.89 DECREASED FUNCTIONAL ACTIVITY TOLERANCE: ICD-10-CM

## 2024-02-29 PROCEDURE — 97110 THERAPEUTIC EXERCISES: CPT | Mod: HCNC,PN

## 2024-02-29 PROCEDURE — 97014 ELECTRIC STIMULATION THERAPY: CPT | Mod: HCNC,PN

## 2024-02-29 PROCEDURE — 97112 NEUROMUSCULAR REEDUCATION: CPT | Mod: HCNC,PN

## 2024-02-29 PROCEDURE — 97530 THERAPEUTIC ACTIVITIES: CPT | Mod: HCNC,PN

## 2024-03-01 NOTE — PROGRESS NOTES
[facility]   Physical Therapy Treatment Note      Name: Saumya Braun  Clinic Number: [id number]    Therapy Diagnosis:   Encounter Diagnoses   Name Primary?    Decreased ROM of neck Yes    Decreased functional activity tolerance     Decreased strength of upper extremity      Physician: Pablito Jean MD    Visit Date: 3/4/2024    Physician Orders: PT Eval and Treat  Medical Diagnosis from Referral: M54.2 (ICD-10-CM) - Neck pain on left side   Evaluation Date: 2/8/2024  Authorization Period Expiration: 5/16/24  Plan of Care Expiration: 4/8/24  Progress Note Due: 3/8/24  Date of Surgery: N/A  Visit # / Visits authorized: 6 / 12 (1 / 1 Eval)  FOTO: 2 / 3     Precautions: HTN, Type 2 Diabetes, Chronic Heart Failure      Time In: 9:55 AM  Time Out: 10:48 AM  Total Billable Time: 53 minutes    PTA Visit #: 0/5     Subjective     Patient reports: that she is not having any pain unless she moves her neck too much but thinks this is getting better overall. No problems after last session.     She was compliant with home exercise program.  Response to previous treatment: no adverse reactions reported  Functional change: in progress     Pain: 2/10  Location: left neck      Objective      Objective Measures updated at progress report unless specified.     Treatment     Saumya received the treatments listed below:      Therapeutic Exercises to develop strength, endurance, ROM, and flexibility for 25 minutes including:     Patient Education and Home Exercise Program Review  UBE x2 min each forward and backward  Standing Wall Slides x20 reps B  Supine Cervical Rotations 2x10 reps - over towel roll  Supine Cervical Nods 2x10 reps - over towel roll  Supine Shoulder Flexion 2x10 reps + #1 Dowel  Standing Ball Walk Ups x5 reps      Neuromuscular Re-education activities to improve: Balance, Coordination, Kinesthetic, Sense, Proprioception, and Posture for 10 minutes. The following activities were  included:     Seated Scap Retractions 3x10 reps B (3s holds)  Supine and Seated Chin Tucks 2x10 reps B (3s holds)  Standing Cervical Tucks against ball on wall x10 reps  Supine and Seated Horizontals Shoulder Abduction x20 reps + YTB - emphasis on scap squeeze (10 rep seated)    Therapeutic Activities to improve functional performance for 8 minutes, including:    Standing Shoulder Extensions 3x10 reps + RTB B  Standing Shoulder High Rows 3x10 reps + RTB B       Manual Therapy Techniques: Manual traction, Myofacial release, and Soft tissue Mobilization were applied to the: Cervical Spine for 10 minutes, including:    Supine Gentle Cervical Distraction  STM to the cervical paraspinals and Upper Traps B  Supine Suboccipital Release B    Patient received a hot pack for 8 minutes to the cervical musculature to assist with pain relief and tissue extensibility. No adverse reactions noted. Patient supine for comfort.    Supervised Modalities after being cleared for contradictions: IFC Electrical Stimulation:  Saumya received IFC Electrical Stimulation for pain control applied to the cervical musculature. Pt received stimulation at 100% scan for 8 minutes. Saumya tolderated treatment well without any adverse effects. Patient supine for comfort and LE's over wedge.    Patient Education and Home Exercises       Education provided:   - Home Exercise Program Review  - Post Exercise Soreness  - Maintaining a pain free range of motion with all activities  - Anatomy/Physiology of the Neck and the surrounding musculature    Written Home Exercises Provided: Patient instructed to cont prior HEP. Exercises were reviewed and Saumya was able to demonstrate them prior to the end of the session.  Saumya demonstrated good  understanding of the education provided. See Electronic Medical Record under Patient Instructions for exercises provided during therapy sessions    Assessment     Emphasis continues to be placed on UE strengthening  and cervical joint mobility without exacerbation of symptoms. With supine cervical rotation - there is pain present with initial movement but this does improve/decrease with increased movement/continuation of exercise. Manual therapy techniques continue to feel beneficial for the patient. Will plan to assess patient next visit.    Saumya Is progressing well towards her goals.   Patient prognosis is Fair.     Patient will continue to benefit from skilled outpatient physical therapy to address the deficits listed in the problem list box on initial evaluation, provide pt/family education and to maximize p t's level of independence in the home and community environment.     Patient's spiritual, cultural and educational needs considered and pt agreeable to plan of care and goals.     Anticipated barriers to physical therapy: none stated    Goals:   Short Term Goals: 4 weeks  (progressing, not met)  - Patient will be able to sleep in her regular bed with minimal to no provocation of symptoms for improved ability to rest and recover.  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  - Patient will demonstrate improved UE strength, especially into shoulder ER by at least 1/2 grade via MMT for increased stability and support with functional tasks.  - Patient will demonstrate increased cervical range of motion, especially into all planes of movement by at least 5 degrees for increased ability to perform daily duties.     Long Term Goals: 8 weeks (progressing, not met)  - Patient will demonstrate improved UE strength, especially into shoulder scpation by at least 1/2 grade via MMT for increased stability and support with functional tasks.  - Patient will demonstrate increased cervical range of motion, especially into all planes of movement by at least 10 degrees for increased ability to perform daily duties.  - Patient will demonstrate  independence with Home Exercise Program for continued improvements outside the clinical setting.  - Patient will demonstrate improved FOTO score that is greater than or equal to the predicted value for increased ability to perform ADL's.    Plan     Continue with established POC for improved functional mobility overall.    Kalie Zarco, PT, DPT, Cert. DN

## 2024-03-04 ENCOUNTER — CLINICAL SUPPORT (OUTPATIENT)
Dept: REHABILITATION | Facility: HOSPITAL | Age: 79
End: 2024-03-04
Payer: MEDICARE

## 2024-03-04 DIAGNOSIS — R29.898 DECREASED STRENGTH OF UPPER EXTREMITY: ICD-10-CM

## 2024-03-04 DIAGNOSIS — R29.898 DECREASED ROM OF NECK: Primary | ICD-10-CM

## 2024-03-04 DIAGNOSIS — R68.89 DECREASED FUNCTIONAL ACTIVITY TOLERANCE: ICD-10-CM

## 2024-03-04 PROCEDURE — 97110 THERAPEUTIC EXERCISES: CPT | Mod: HCNC,PN

## 2024-03-04 PROCEDURE — 97530 THERAPEUTIC ACTIVITIES: CPT | Mod: HCNC,PN

## 2024-03-04 PROCEDURE — 97014 ELECTRIC STIMULATION THERAPY: CPT | Mod: HCNC,PN

## 2024-03-04 PROCEDURE — 97112 NEUROMUSCULAR REEDUCATION: CPT | Mod: HCNC,PN

## 2024-03-08 NOTE — PROGRESS NOTES
[facility]   Physical Therapy Treatment Note, Re-assessment, and Discharge Summary     Name: Saumya Braun  Clinic Number: [id number]    Therapy Diagnosis:   Encounter Diagnoses   Name Primary?    Decreased ROM of neck Yes    Decreased functional activity tolerance     Decreased strength of upper extremity      Physician: Pablito Jean MD    Visit Date: 3/12/2024    Physician Orders: PT Eval and Treat  Medical Diagnosis from Referral: M54.2 (ICD-10-CM) - Neck pain on left side   Evaluation Date: 2/8/2024  Authorization Period Expiration: 5/16/24  Plan of Care Expiration: 4/8/24  Progress Note Due: 3/8/24  Date of Surgery: N/A  Visit # / Visits authorized: 7 / 12 (1 / 1 Eval) (re-assessed on 3/12/24)  FOTO: 2 / 3     Precautions: HTN, Type 2 Diabetes, Chronic Heart Failure      Time In: 8:00 AM  Time Out: 8:53 AM  Total Billable Time: 53 minutes    PTA Visit #: 0/5     Date of Last visit: 3/12/24  Total Visits Received: 8    Subjective     Patient reports: that she feels like her neck is basically back to normal at this point and is ready for discharge.     She was compliant with home exercise program.  Response to previous treatment: no adverse reactions reported  Functional change: in progress - decreased pain and improved mobility    Pain: 0/10  Location: left neck      Objective      Objective Measures updated at progress report unless specified.     Posture: FAIR - slightly forward head with rounded shoulders - very pleasant WF.     Gait: normal joey and step length observed. No AD used.     Cervical Range of Motion:     Degrees Observation Pain   Flexion 40 - -   Extension 40 - -      Right Rotation 50% limited - -   Left Rotation 50% limited - -      Right Sidebend 20 - + on L      Left Sidebend 15 -  + on L            Shoulder Active Range of Motion:   Shoulder Right Left   Flexion    180 180   Abduction    180 180   ER at 90    90 90   IR at 90 70 70         Strength:     Right Left   Flexion  5/5 5/5   Abduction 5/5 5/5   Scaption 5/5 5/5   Shoulder ER at side 4+/5 4+/5   Shoulder IR at side  4+/5 4+/5   Middle trap 4/5 4/5   Lower trap 4/5 4/5      Pain = *     Special Tests:     Ligamentous Stability     Sharp-Delmy -      Distraction +   Compression -   Spurlings -      Cervical Joint Mobility: decreased along the transverse plane from C3-C7 but improvement is noted     Thoracic Joint Mobility: decreased moving into extension     Palpation: mild TTP along the cervical paraspinals on the L      Intake Outcome Measure for FOTO Neck Survey     Therapist reviewed FOTO scores for Saumya Braun on 2/8/2024.   FOTO report - see Media section or FOTO account episode details.     Intake Score: 12      Treatment     Saumya received the treatments listed below:      Therapeutic Exercises to develop strength, endurance, ROM, and flexibility for 35 minutes including:     Re-assessment  Patient Education and Home Exercise Program Review  UBE x2 min each forward and backward  Standing Wall Slides x20 reps B  Supine Cervical Rotations 2x10 reps - over towel roll  Supine Cervical Nods 2x10 reps - over towel roll  Supine Shoulder Flexion 2x10 reps + #1 Dowel  Standing Ball Walk Ups x5 reps      Neuromuscular Re-education activities to improve: Balance, Coordination, Kinesthetic, Sense, Proprioception, and Posture for 10 minutes. The following activities were included:     Seated Scap Retractions 3x10 reps B (3s holds)  Supine and Seated Chin Tucks 2x10 reps B (3s holds)  Standing Cervical Tucks against ball on wall x10 reps  Supine and Seated Horizontals Shoulder Abduction x20 reps + YTB - emphasis on scap squeeze (10 rep seated)    Therapeutic Activities to improve functional performance for 8 minutes, including:    Standing Shoulder Extensions 3x10 reps + GTB B  Standing Shoulder High Rows 3x10 reps + GTB B         Patient received a hot pack for 8 minutes to the cervical musculature to  assist with pain relief and tissue extensibility. No adverse reactions noted. Patient supine for comfort.    Supervised Modalities after being cleared for contradictions: IFC Electrical Stimulation:  Saumya received IFC Electrical Stimulation for pain control applied to the cervical musculature. Pt received stimulation at 100% scan for 8 minutes. Saumya tolderated treatment well without any adverse effects. Patient supine for comfort and LE's over wedge.    Patient Education and Home Exercises       Education provided:   - Home Exercise Program Review  - Post Exercise Soreness  - Maintaining a pain free range of motion with all activities  - Anatomy/Physiology of the Neck and the surrounding musculature    Written Home Exercises Provided: Patient instructed to cont prior HEP. Exercises were reviewed and Saumya was able to demonstrate them prior to the end of the session. Saumya demonstrated good  understanding of the education provided. See Electronic Medical Record under Patient Instructions for exercises provided during therapy sessions    Assessment     Upon re-assessment, noted patient to demonstrate improved cervical joint mobility and increased UE strengthening. Cervical range of motion improved into all planes of movement compared to the initial evaluation. Pain is minimal to non-existent at this time and the patient is independent with Home Exercise Program. At this point, the patient is now discharged from skilled outpatient PT services.    Discharge reason: Patient is now asymptomatic and Patient has met all of his/her goals    Discharge FOTO Score: 12 - Predicted Score Achieved    Saumya Is progressing well towards her goals.   Patient prognosis is Fair.     Patient will continue to benefit from skilled outpatient physical therapy to address the deficits listed in the problem list box on initial evaluation, provide pt/family education and to maximize p t's level of independence in the home and  community environment.     Patient's spiritual, cultural and educational needs considered and pt agreeable to plan of care and goals.     Anticipated barriers to physical therapy: none stated    Goals:   Short Term Goals: 4 weeks   - Patient will be able to sleep in her regular bed with minimal to no provocation of symptoms for improved ability to rest and recover (MET: 3/12/24)  - Patient will be able to return to washing dishes with minimal to no provocation of symptoms for increased tolerance to ADL's. (MET: 3/12/24)  - Patient will demonstrate improved UE strength, especially into shoulder ER by at least 1/2 grade via MMT for increased stability and support with functional tasks. (MET: 3/12/24)  - Patient will demonstrate increased cervical range of motion, especially into all planes of movement by at least 5 degrees for increased ability to perform daily duties. (MET: 3/12/24)     Long Term Goals: 8 weeks   - Patient will demonstrate improved UE strength, especially into shoulder scpation by at least 1/2 grade via MMT for increased stability and support with functional tasks. (MET: 3/12/24)  - Patient will demonstrate increased cervical range of motion, especially into all planes of movement by at least 10 degrees for increased ability to perform daily duties. (MET: 3/12/24)  - Patient will demonstrate independence with Home Exercise Program for continued improvements outside the clinical setting. (MET: 3/12/24)  - Patient will demonstrate improved FOTO score that is greater than or equal to the predicted value for increased ability to perform ADL's. (MET: 3/12/24)    Plan     This patient is discharged from skilled outpatient Physical Therapy services.    Kalie Zarco, PT, DPT, Cert. DN

## 2024-03-12 ENCOUNTER — CLINICAL SUPPORT (OUTPATIENT)
Dept: REHABILITATION | Facility: HOSPITAL | Age: 79
End: 2024-03-12
Payer: MEDICARE

## 2024-03-12 DIAGNOSIS — R68.89 DECREASED FUNCTIONAL ACTIVITY TOLERANCE: ICD-10-CM

## 2024-03-12 DIAGNOSIS — R29.898 DECREASED ROM OF NECK: Primary | ICD-10-CM

## 2024-03-12 DIAGNOSIS — R29.898 DECREASED STRENGTH OF UPPER EXTREMITY: ICD-10-CM

## 2024-03-12 PROCEDURE — 97530 THERAPEUTIC ACTIVITIES: CPT | Mod: HCNC,PN

## 2024-03-12 PROCEDURE — 97014 ELECTRIC STIMULATION THERAPY: CPT | Mod: HCNC,PN

## 2024-03-12 PROCEDURE — 97110 THERAPEUTIC EXERCISES: CPT | Mod: HCNC,PN

## 2024-03-12 PROCEDURE — 97112 NEUROMUSCULAR REEDUCATION: CPT | Mod: HCNC,PN

## 2024-05-18 ENCOUNTER — PATIENT MESSAGE (OUTPATIENT)
Dept: FAMILY MEDICINE | Facility: CLINIC | Age: 79
End: 2024-05-18
Payer: MEDICARE

## 2024-06-05 DIAGNOSIS — E11.65 TYPE 2 DIABETES MELLITUS WITH HYPERGLYCEMIA, WITHOUT LONG-TERM CURRENT USE OF INSULIN: Chronic | ICD-10-CM

## 2024-06-05 NOTE — TELEPHONE ENCOUNTER
Provider Staff:  Action required for this patient    Requires labs      Please see care gap opportunities below in Care Due Message.    Thanks!  [facility]     Appointments      Date Provider   Last Visit   2/7/2024 Pablito Jean MD   Next Visit   Visit date not found Pablito Jean MD     Refill Decision Note   Saumya Braun  is requesting a refill authorization.  Brief Assessment and Rationale for Refill:  Approve     Medication Therapy Plan:  active on med list      Comments:     Note composed:10:42 AM 06/05/2024

## 2024-06-05 NOTE — TELEPHONE ENCOUNTER
Care Due:                  Date            Visit Type   Department     Provider  --------------------------------------------------------------------------------                                EP -                              PRIMARY      [facility] FAM[facility]  Last Visit: 02-      CARE ([facility])   MEDICINE       Pablito Jean  Next Visit: None Scheduled  None         None Found                                                            Last  Test          Frequency    Reason                     Performed    Due Date  --------------------------------------------------------------------------------    HBA1C.......  6 months...  JARDIANCE................  01-   07-    [facility] Embedded Care Due Messages. Reference number: [id number].   6/05/2024 9:19:18 AM CDT

## 2024-06-05 NOTE — TELEPHONE ENCOUNTER
----- Message from Alie Méndez sent at 6/5/2024  8:28 AM CDT -----  Contact: Saumya  Type:  RX Refill Request    Who Called: Saumya  Refill or New Rx:refill  RX Name and Strength:JARDIANCE 25 mg tablet  How is the patient currently taking it? (ex. 1XDay):1xday  Is this a 30 day or 90 day RX:90day  Preferred Pharmacy with phone number:  [facility] - [facility] [address]  [address] [facility] [address]  Phone: [phone] Fax: [phone]  Local or Mail Order:local  Ordering Provider:   Would the patient rather a call back or a response via [facility]?  Call back   Best Call Back Number:[phone]  Additional Information:      Thanks   Am

## 2024-09-03 DIAGNOSIS — E11.65 TYPE 2 DIABETES MELLITUS WITH HYPERGLYCEMIA, WITHOUT LONG-TERM CURRENT USE OF INSULIN: Chronic | ICD-10-CM

## 2024-09-03 NOTE — TELEPHONE ENCOUNTER
Care Due:                  Date            Visit Type   Department     Provider  --------------------------------------------------------------------------------                                EP -                              PRIMARY      [facility] FAMILY  Last Visit: 02-      CARE ([facility])   MEDICINE       Pablito Jean  Next Visit: None Scheduled  None         None Found                                                            Last  Test          Frequency    Reason                     Performed    Due Date  --------------------------------------------------------------------------------    HBA1C.......  6 months...  empagliflozin............  01-   07-    [facility] Embedded Care Due Messages. Reference number: [id number].   9/03/2024 11:49:16 AM CDT

## 2024-09-04 RX ORDER — EMPAGLIFLOZIN 25 MG/1
25 TABLET, FILM COATED ORAL
Qty: 90 TABLET | Refills: 0 | Status: SHIPPED | OUTPATIENT
Start: 2024-09-04

## 2024-09-04 NOTE — TELEPHONE ENCOUNTER
Refill Routing Note   Medication(s) are not appropriate for processing by [facility] for the following reason(s):        Required labs outdated  Required vitals outdated    [facility] action(s):  Defer   Requires labs : Yes             Appointments  past 12m or future 3m with PCP    Date Provider   Last Visit   2/7/2024 Pablito Jean MD   Next Visit   Visit date not found Pablito Jean MD   ED visits in past 90 days: 0        Note composed:10:56 AM 09/04/2024

## 2024-09-11 ENCOUNTER — PATIENT MESSAGE (OUTPATIENT)
Dept: FAMILY MEDICINE | Facility: CLINIC | Age: 79
End: 2024-09-11
Payer: MEDICARE

## 2024-09-26 ENCOUNTER — OFFICE VISIT (OUTPATIENT)
Dept: FAMILY MEDICINE | Facility: CLINIC | Age: 79
End: 2024-09-26
Payer: MEDICARE

## 2024-09-26 VITALS
BODY MASS INDEX: 24.01 KG/M2 | WEIGHT: 127.19 LBS | SYSTOLIC BLOOD PRESSURE: 136 MMHG | DIASTOLIC BLOOD PRESSURE: 79 MMHG | HEIGHT: 61 IN | TEMPERATURE: 98 F | HEART RATE: 72 BPM

## 2024-09-26 DIAGNOSIS — E78.1 HYPERTRIGLYCERIDEMIA: ICD-10-CM

## 2024-09-26 DIAGNOSIS — Z00.00 ENCOUNTER FOR MEDICARE ANNUAL WELLNESS EXAM: Primary | ICD-10-CM

## 2024-09-26 DIAGNOSIS — Z79.02 ANTIPLATELET OR ANTITHROMBOTIC LONG-TERM USE: Chronic | ICD-10-CM

## 2024-09-26 DIAGNOSIS — R91.8 MULTIPLE PULMONARY NODULES: Chronic | ICD-10-CM

## 2024-09-26 DIAGNOSIS — I25.10 CORONARY ARTERY DISEASE INVOLVING NATIVE CORONARY ARTERY OF NATIVE HEART WITHOUT ANGINA PECTORIS: Chronic | ICD-10-CM

## 2024-09-26 DIAGNOSIS — T46.6X5A MYALGIA DUE TO STATIN: ICD-10-CM

## 2024-09-26 DIAGNOSIS — E78.5 HYPERLIPIDEMIA ASSOCIATED WITH TYPE 2 DIABETES MELLITUS: Chronic | ICD-10-CM

## 2024-09-26 DIAGNOSIS — E11.59 HYPERTENSION ASSOCIATED WITH DIABETES: Chronic | ICD-10-CM

## 2024-09-26 DIAGNOSIS — I15.2 HYPERTENSION ASSOCIATED WITH DIABETES: Chronic | ICD-10-CM

## 2024-09-26 DIAGNOSIS — I70.0 ATHEROSCLEROSIS OF AORTA: ICD-10-CM

## 2024-09-26 DIAGNOSIS — H35.3132 NONEXUDATIVE AGE-RELATED MACULAR DEGENERATION, BILATERAL, INTERMEDIATE DRY STAGE: Chronic | ICD-10-CM

## 2024-09-26 DIAGNOSIS — M79.10 MYALGIA DUE TO STATIN: ICD-10-CM

## 2024-09-26 DIAGNOSIS — Z79.899 LONG TERM CURRENT USE OF DIURETIC: Chronic | ICD-10-CM

## 2024-09-26 DIAGNOSIS — Z78.0 MENOPAUSE: ICD-10-CM

## 2024-09-26 DIAGNOSIS — E11.65 TYPE 2 DIABETES MELLITUS WITH HYPERGLYCEMIA, WITHOUT LONG-TERM CURRENT USE OF INSULIN: Chronic | ICD-10-CM

## 2024-09-26 DIAGNOSIS — I50.32 CHRONIC HEART FAILURE WITH PRESERVED EJECTION FRACTION: Chronic | ICD-10-CM

## 2024-09-26 DIAGNOSIS — D75.89 MACROCYTOSIS WITHOUT ANEMIA: ICD-10-CM

## 2024-09-26 DIAGNOSIS — E11.69 HYPERLIPIDEMIA ASSOCIATED WITH TYPE 2 DIABETES MELLITUS: Chronic | ICD-10-CM

## 2024-09-26 PROBLEM — M54.2 NECK PAIN ON LEFT SIDE: Status: RESOLVED | Noted: 2024-02-07 | Resolved: 2024-09-26

## 2024-09-26 PROBLEM — L30.9 DERMATITIS: Status: RESOLVED | Noted: 2022-07-22 | Resolved: 2024-09-26

## 2024-09-26 PROCEDURE — 99999 PR PBB SHADOW E&M-EST. PATIENT-LVL V: CPT | Mod: PBBFAC,HCNC,, | Performed by: NURSE PRACTITIONER

## 2024-09-26 RX ORDER — HYDROCHLOROTHIAZIDE 12.5 MG/1
12.5 CAPSULE ORAL
COMMUNITY
Start: 2024-07-30

## 2024-09-26 RX ORDER — NAPROXEN SODIUM 220 MG/1
81 TABLET, FILM COATED ORAL
COMMUNITY

## 2024-09-26 NOTE — PATIENT INSTRUCTIONS
Counseling and Referral of Other Preventative  (Italic type indicates deductible and co-insurance are waived)    Patient Name: Saumya Braun  Today's Date: 9/26/2024    Health Maintenance       Date Due Completion Date    Hemoglobin A1c 07/11/2024 1/11/2024    COVID-19 Vaccine (6 - 2023-24 season) 09/01/2024 12/6/2023    Eye Exam 10/02/2024 10/2/2023    Override on 12/12/2017: Done (No Mention of Diabetic Retinopathy)    Override on 8/10/2016: Done (Dr. Schwab)    Shingles Vaccine (1 of 2) 03/26/2025 (Originally 3/2/1995) ---    TETANUS VACCINE 09/09/2029 (Originally 3/2/1963) ---    Lipid Panel 01/11/2025 1/11/2024    Diabetes Urine Screening 02/07/2025 2/7/2024    DEXA Scan 02/20/2026 2/20/2024        No orders of the defined types were placed in this encounter.    The following information is provided to all patients.  This information is to help you find resources for any of the problems found today that may be affecting your health:                  Living healthy guide: www.[facility].[place].gov      Understanding Diabetes: www.diabetes.org      Eating healthy: www.cdc.gov/healthyweight      CDC home safety checklist: www.cdc.gov/steadi/patient.html      Agency on Aging: www.goea.[place].[facility]      Alcoholics anonymous (AA): www.aa.org      Physical Activity: www.jeanmarie.nih.gov/vs2jfud      Tobacco use: www.quitwithusla.org

## 2024-09-26 NOTE — Clinical Note
Please let patient know she has a history of pulmonary nodules noted on previous imaging. She is due for repeat CT scan of the chest for monitoring. I have ordered this. Please schedule.

## 2024-09-26 NOTE — PROGRESS NOTES
"  Saumya Braun presented for a follow-up Medicare AWV today. The following components were reviewed and updated:    Medical history  Family History  Social history  Allergies and Current Medications  Health Risk Assessment  Health Maintenance  Care Team    **See Completed Assessments for Annual Wellness visit with in the encounter summary    The following assessments were completed:  Depression Screening  Cognitive function Screening  Timed Get Up Test  Whisper Test      Opioid documentation:    Patient does not have a current opioid prescription.        Vitals:    09/26/24 1446   BP: 136/79   Pulse: 72   Temp: 97.7 °F (36.5 °C)   TempSrc: Temporal   Weight: 57.7 kg (127 lb 3.2 oz)   Height: 5' 1" (1.549 m)     Body mass index is 24.03 kg/m².       Physical Exam  Constitutional:       General: She is not in acute distress.     Appearance: Normal appearance. She is not ill-appearing.   HENT:      Head: Normocephalic and atraumatic.      Right Ear: External ear normal.      Left Ear: External ear normal.      Nose: Nose normal.      Mouth/Throat:      Mouth: Mucous membranes are moist.   Eyes:      Extraocular Movements: Extraocular movements intact.      Conjunctiva/sclera: Conjunctivae normal.   Cardiovascular:      Rate and Rhythm: Normal rate.      Heart sounds: Normal heart sounds.   Pulmonary:      Effort: Pulmonary effort is normal. No respiratory distress.   Musculoskeletal:         General: Normal range of motion.      Cervical back: Normal range of motion.   Skin:     General: Skin is warm and dry.      Capillary Refill: Capillary refill takes less than 2 seconds.   Neurological:      General: No focal deficit present.      Mental Status: She is alert and oriented to person, place, and time. Mental status is at baseline.   Psychiatric:         Mood and Affect: Mood normal.         Behavior: Behavior normal.         Thought Content: Thought content normal.         Judgment: Judgment normal.       Diagnoses " and health risks identified today and associated recommendations/orders:  1. Encounter for Medicare annual wellness exam  Complete history and physical was completed today.  Complete and thorough medication reconciliation was performed.  Discussed risks and benefits of medications.  Advised patient on orders and health maintenance.  We discussed old records and old labs if available.  Will request any records not available through epic.  Continue current medications listed on your summary sheet.    Advised of due health maintenance  Due for labs  Discussed eye exam due soon  Discussed covid booster which she desires to get soon from the pharmacy     - Ambulatory Referral/Consult to Enhanced Annual Wellness Visit (eAWV)    2. Type 2 diabetes mellitus with hyperglycemia, without long-term current use of insulin  Chronic. Stable.  Continue current medications and plan of care per PCP and specialists   A1C due- update labs     Lab Results   Component Value Date    HGBA1C 7.3 (H) 01/11/2024     Diabetes Medications               JARDIANCE 25 mg tablet Take 1 tablet (25 mg total) by mouth once daily.     3. Hypertension associated with diabetes  Chronic. Stable.  Continue current medications and plan of care per PCP and specialists   Blood pressure at goal  Followed by cardiology    Hypertension Medications               hydroCHLOROthiazide (MICROZIDE) 12.5 mg capsule Take 12.5 mg by mouth.    metoprolol succinate (TOPROL-XL) 100 MG 24 hr tablet Take 1 tablet (100 mg total) by mouth daily    furosemide (LASIX) 20 MG tablet Take 1 tablet (20 mg total) by mouth once daily.     4. Coronary artery disease involving native coronary artery of native heart without angina pectoris  Chronic. Stable.  Continue current medications and plan of care per PCP and specialists   Followed by cardiology  Continue ASA, repatha     5. Multiple pulmonary nodules  Chronic. Stable.  Continue current medications and plan of care per PCP and  specialists   Update CT chest    6. Hyperlipidemia associated with type 2 diabetes mellitus  Chronic. Stable.  Continue current medications and plan of care per PCP and specialists   Followed by cardiology   Due for lipid panel- update labs     -recent labs listed below:  Lab Results   Component Value Date    CHOL 141 01/11/2024     Lab Results   Component Value Date    HDL 43 01/11/2024     Lab Results   Component Value Date    LDLCALC 47.2 (L) 01/11/2024     Lab Results   Component Value Date    TRIG 254 (H) 01/11/2024     Lab Results   Component Value Date    ALT 23 01/11/2024    AST 27 01/11/2024    ALKPHOS 48 (L) 01/11/2024    BILITOT 0.4 01/11/2024     Hyperlipidemia Medications               omega-3 fatty acids-fish oil 340-1,000 mg Cap Take 1 capsule by mouth once daily.    REPATHA SURECLICK 140 mg/mL PnIj      7. Antiplatelet or antithrombotic long-term use  Chronic. Stable.  Continue current medications and plan of care per PCP and specialists   Continue ASA, patient reports she was taken off plavix      8. Long term current use of diuretic  Chronic. Stable.  Continue current medications and plan of care per PCP and specialists   Followed by cardiology  Continue lasix     9. Chronic heart failure with preserved ejection fraction  Chronic. Stable.  Continue current medications and plan of care per PCP and specialists   Followed by cardiology    10. Macrocytosis without anemia  Chronic. Stable.  Continue current medications and plan of care per PCP and specialists     Lab Results   Component Value Date    WBC 5.27 01/11/2024    HGB 13.6 01/11/2024    HCT 42.9 01/11/2024    MCV 99 (H) 01/11/2024     01/11/2024     11. Atherosclerosis of aorta  Chronic. Stable.  Continue current medications and plan of care per PCP and specialists   Followed by cardiology    12. Myalgia due to statin  Chronic. Stable.  Continue current medications and plan of care per PCP and specialists   Followed by cardiology    13.  Menopause  Chronic. Stable.  Continue current medications and plan of care per PCP and specialists   SAMMY UTAMBROCIO    14. Hypertriglyceridemia  Chronic. Stable.  Continue current medications and plan of care per PCP and specialists   Update lipid panel    15. Nonexudative age-related macular degeneration, bilateral, intermediate dry stage  Chronic. Stable.  Continue current medications and plan of care per PCP and specialists   Followed by ophthalmology  Advised eye exam due    Provided Saumya with a 5-10 year written screening schedule and personal prevention plan. Recommendations were developed using the USPSTF age appropriate recommendations. Education, counseling, and referrals were provided as needed.  After Visit Summary printed and given to patient which includes a list of additional screenings\tests needed.    Follow up for visit in clinic as needed.    Cristina Valle NP    I offered to discuss advanced care planning, including how to pick a person who would make decisions for you if you were unable to make them for yourself, called a health care power of , and what kind of decisions you might make such as use of life sustaining treatments such as ventilators and tube feeding when faced with a life limiting illness recorded on a living will that they will need to know. (How you want to be cared for as you near the end of your natural life)     X Patient is interested in learning more about how to make advanced directives.  I provided them paperwork and offered to discuss this with them.

## 2024-10-02 ENCOUNTER — LAB VISIT (OUTPATIENT)
Dept: LAB | Facility: HOSPITAL | Age: 79
End: 2024-10-02
Attending: NURSE PRACTITIONER
Payer: MEDICARE

## 2024-10-02 DIAGNOSIS — Z00.00 WELL ADULT EXAM: ICD-10-CM

## 2024-10-02 DIAGNOSIS — Z79.899 ENCOUNTER FOR LONG-TERM (CURRENT) USE OF MEDICATIONS: ICD-10-CM

## 2024-10-02 DIAGNOSIS — Z13.6 ENCOUNTER FOR LIPID SCREENING FOR CARDIOVASCULAR DISEASE: ICD-10-CM

## 2024-10-02 DIAGNOSIS — Z13.220 ENCOUNTER FOR LIPID SCREENING FOR CARDIOVASCULAR DISEASE: ICD-10-CM

## 2024-10-02 LAB
ALBUMIN SERPL BCP-MCNC: 3.9 G/DL (ref 3.5–5.2)
ALP SERPL-CCNC: 50 U/L (ref 55–135)
ALT SERPL W/O P-5'-P-CCNC: 36 U/L (ref 10–44)
ANION GAP SERPL CALC-SCNC: 13 MMOL/L (ref 8–16)
AST SERPL-CCNC: 29 U/L (ref 10–40)
BILIRUB SERPL-MCNC: 0.4 MG/DL (ref 0.1–1)
BUN SERPL-MCNC: 20 MG/DL (ref 8–23)
CALCIUM SERPL-MCNC: 10 MG/DL (ref 8.7–10.5)
CHLORIDE SERPL-SCNC: 104 MMOL/L (ref 95–110)
CHOLEST SERPL-MCNC: 122 MG/DL (ref 120–199)
CHOLEST/HDLC SERPL: 3.1 {RATIO} (ref 2–5)
CO2 SERPL-SCNC: 23 MMOL/L (ref 23–29)
CREAT SERPL-MCNC: 0.8 MG/DL (ref 0.5–1.4)
EST. GFR  (NO RACE VARIABLE): >60 ML/MIN/1.73 M^2
ESTIMATED AVG GLUCOSE: 157 MG/DL (ref 68–131)
GLUCOSE SERPL-MCNC: 126 MG/DL (ref 70–110)
HBA1C MFR BLD: 7.1 % (ref 4–5.6)
HDLC SERPL-MCNC: 40 MG/DL (ref 40–75)
HDLC SERPL: 32.8 % (ref 20–50)
LDLC SERPL CALC-MCNC: 26.4 MG/DL (ref 63–159)
NONHDLC SERPL-MCNC: 82 MG/DL
POTASSIUM SERPL-SCNC: 4.3 MMOL/L (ref 3.5–5.1)
PROT SERPL-MCNC: 7.4 G/DL (ref 6–8.4)
SODIUM SERPL-SCNC: 140 MMOL/L (ref 136–145)
TRIGL SERPL-MCNC: 278 MG/DL (ref 30–150)

## 2024-10-02 PROCEDURE — 36415 COLL VENOUS BLD VENIPUNCTURE: CPT | Mod: HCNC,PO | Performed by: FAMILY MEDICINE

## 2024-10-02 PROCEDURE — 80053 COMPREHEN METABOLIC PANEL: CPT | Mod: HCNC | Performed by: FAMILY MEDICINE

## 2024-10-02 PROCEDURE — 83036 HEMOGLOBIN GLYCOSYLATED A1C: CPT | Mod: HCNC | Performed by: FAMILY MEDICINE

## 2024-10-02 PROCEDURE — 80061 LIPID PANEL: CPT | Mod: HCNC | Performed by: FAMILY MEDICINE

## 2024-10-03 NOTE — PROGRESS NOTES
Make follow-up lab appointment per recommendation below.  Check to see if patient has seen the results through my chart.  If not then,  #CALL THE PATIENT# to discuss results/see if they have questions and document verification of contact. Make F/U appt if needed. [phone]    #My interpretation that was sent to them through [employer]:  Saumya, I have reviewed your recent blood work.     Your metabolic panel which shows your glucose, kidney function, electrolytes, and liver function is stable.   Your cholesterol is stable.  Triglycerides remain elevated.  Please follow-up to discuss hypertriglyceridemia management.  Your hemoglobin A1c is slightly improved.  Please follow-up to discuss diabetes This test is gold standard screening test for diabetes.  It is a measures 3 months of your average blood sugar.  =========================  Also please address any outstanding health maintenance that may be due: COVID-19 Vaccine(6 - 2024-25 season) due on 09/01/2024  Eye Exam due on 10/02/2024

## 2024-10-07 ENCOUNTER — OFFICE VISIT (OUTPATIENT)
Dept: FAMILY MEDICINE | Facility: CLINIC | Age: 79
End: 2024-10-07
Payer: MEDICARE

## 2024-10-07 VITALS
HEIGHT: 61 IN | RESPIRATION RATE: 17 BRPM | SYSTOLIC BLOOD PRESSURE: 132 MMHG | WEIGHT: 125.13 LBS | TEMPERATURE: 98 F | HEART RATE: 69 BPM | DIASTOLIC BLOOD PRESSURE: 64 MMHG | BODY MASS INDEX: 23.63 KG/M2 | OXYGEN SATURATION: 96 %

## 2024-10-07 DIAGNOSIS — E78.1 HYPERTRIGLYCERIDEMIA: ICD-10-CM

## 2024-10-07 DIAGNOSIS — Z79.899 ENCOUNTER FOR LONG-TERM (CURRENT) USE OF MEDICATIONS: Chronic | ICD-10-CM

## 2024-10-07 DIAGNOSIS — E78.5 HYPERLIPIDEMIA ASSOCIATED WITH TYPE 2 DIABETES MELLITUS: Chronic | ICD-10-CM

## 2024-10-07 DIAGNOSIS — E11.69 HYPERLIPIDEMIA ASSOCIATED WITH TYPE 2 DIABETES MELLITUS: Chronic | ICD-10-CM

## 2024-10-07 DIAGNOSIS — R91.8 MULTIPLE PULMONARY NODULES: Primary | Chronic | ICD-10-CM

## 2024-10-07 DIAGNOSIS — E11.65 TYPE 2 DIABETES MELLITUS WITH HYPERGLYCEMIA, WITHOUT LONG-TERM CURRENT USE OF INSULIN: Chronic | ICD-10-CM

## 2024-10-07 PROCEDURE — 1101F PT FALLS ASSESS-DOCD LE1/YR: CPT | Mod: HCNC,CPTII,S$GLB, | Performed by: NURSE PRACTITIONER

## 2024-10-07 PROCEDURE — 1159F MED LIST DOCD IN RCRD: CPT | Mod: HCNC,CPTII,S$GLB, | Performed by: NURSE PRACTITIONER

## 2024-10-07 PROCEDURE — 3078F DIAST BP <80 MM HG: CPT | Mod: HCNC,CPTII,S$GLB, | Performed by: NURSE PRACTITIONER

## 2024-10-07 PROCEDURE — 1160F RVW MEDS BY RX/DR IN RCRD: CPT | Mod: HCNC,CPTII,S$GLB, | Performed by: NURSE PRACTITIONER

## 2024-10-07 PROCEDURE — 3075F SYST BP GE 130 - 139MM HG: CPT | Mod: HCNC,CPTII,S$GLB, | Performed by: NURSE PRACTITIONER

## 2024-10-07 PROCEDURE — 99999 PR PBB SHADOW E&M-EST. PATIENT-LVL V: CPT | Mod: PBBFAC,HCNC,, | Performed by: NURSE PRACTITIONER

## 2024-10-07 PROCEDURE — 3288F FALL RISK ASSESSMENT DOCD: CPT | Mod: HCNC,CPTII,S$GLB, | Performed by: NURSE PRACTITIONER

## 2024-10-07 PROCEDURE — 1126F AMNT PAIN NOTED NONE PRSNT: CPT | Mod: HCNC,CPTII,S$GLB, | Performed by: NURSE PRACTITIONER

## 2024-10-07 PROCEDURE — 99214 OFFICE O/P EST MOD 30 MIN: CPT | Mod: HCNC,S$GLB,, | Performed by: NURSE PRACTITIONER

## 2024-10-07 RX ORDER — ICOSAPENT ETHYL 1 G/1
1 CAPSULE ORAL DAILY
Qty: 90 CAPSULE | Refills: 1 | Status: SHIPPED | OUTPATIENT
Start: 2024-10-07

## 2024-10-07 NOTE — ASSESSMENT & PLAN NOTE
Trial of Vascepa. Follow up with cardiology next month as scheduled. Continue exercise.  Low-fat high-fiber diet.  Continue Repatha.  Follow-up with Cardiology. Counseled on hyperlipidemia disease course, healthy diet and increased need for exercise.  Please be advised of the risk of cardiovascular disease, increase stroke and heart attack risk with uncontrolled/untreated hyperlipidemia. Patient voiced understanding and understood the treatment plan. All questions were answered.

## 2024-10-07 NOTE — ASSESSMENT & PLAN NOTE
Recommend CT chest for surveillance. Patient states she would like to think about it. Her  sees pulmonologist, Dr. Olivia Umaña. She would like a referral to see this pulmonologist. Referral placed.

## 2024-10-07 NOTE — PROGRESS NOTES
Assessment/Plan:  Problem List Items Addressed This Visit          Psychiatric    Encounter for long-term (current) use of medications (Chronic)    Overview     October 2024: reviewed labs.  February 2024: Reviewed labs.  July 2023: Reviewed labs.  CHRONIC. Stable. Compliant with medications for managed conditions. See medication list. No SE reported.   Routine lab analysis is being monitored. Refills were addressed.  Lab Results   Component Value Date    WBC 5.27 01/11/2024    HGB 13.6 01/11/2024    HCT 42.9 01/11/2024    MCV 99 (H) 01/11/2024     01/11/2024         Chemistry        Component Value Date/Time     10/02/2024 0842    K 4.3 10/02/2024 0842     10/02/2024 0842    CO2 23 10/02/2024 0842    BUN 20 10/02/2024 0842    CREATININE 0.8 10/02/2024 0842     (H) 10/02/2024 0842        Component Value Date/Time    CALCIUM 10.0 10/02/2024 0842    ALKPHOS 50 (L) 10/02/2024 0842    AST 29 10/02/2024 0842    ALT 36 10/02/2024 0842    BILITOT 0.4 10/02/2024 0842    ESTGFRAFRICA >60.0 07/19/2022 0854    EGFRNONAA >60.0 07/19/2022 0854          Lab Results   Component Value Date    TSH 2.122 01/11/2024              Current Assessment & Plan     Complete history and physical was completed today.  Complete and thorough medication reconciliation was performed.  Discussed risks and benefits of medications.  Advised patient on orders and health maintenance.  We discussed old records and old labs if available.  Will request any records not available through epic.  Continue current medications listed on your summary sheet.            Pulmonary    Multiple pulmonary nodules - Primary (Chronic)    Overview     Noted on previous imaging. Patient is a nonsmoker. Denies cough, wheezing, SOB. Her father smoked around her as a child. She has had not repeat CT chest.     CTA CHEST NON CORONARY  Narrative  REASON FOR EXAM: Shortness of breath    TECHNICAL FACTORS: Multiple contiguous axial CT images were  obtained of the chest after the administration of intravenous contrast. ASIR was utilized for radiation reduction. 2-D sagittal and coronal reformatted images were performed. 3-D MIP images were  also obtained with postprocessing performed without the use of an independent workstation under concurrent radiologist supervision.    COMPARISON: None    FINDINGS:  Soft tissues/Musculature: Unremarkable  Osseous Structures:There is exaggerated thoracic kyphosis. There is moderate multilevel spondylosis.  Thyroid: Unremarkable  Esophagus: Enteric tube tip terminates in the gastric body.  Upper Abdomen: Splenic calcifications suggest old granulomatous disease. Colonic diverticula are visualized, without evidence of diverticulitis.  Aorta and Great Vessels: Moderate atherosclerotic calcification.  Heart: Coronary artery calcification. Mild cardiomegaly.  Mediastinum:No mediastinal lymphadenopathy. Mediastinal liz calcifications suggest old granulomatous disease.  Lungs: No evidence of pulmonary embolus. Endotracheal tube tip terminates in the gena. Small tracheal diverticulum is present. There is interlobular septal thickening. There is soft tissue thickening along the bronchovascular bundles. There are hazy  groundglass opacities throughout the lungs. There is bilateral lower lobe atelectasis. Scattered pulmonary nodules, for example in the right middle lobe (2-66, 2-62) and left upper lobe (2-62), measuring up to 7 mm.  Pleura: Small to moderate bilateral pleural effusions are present.    IMPRESSION:   1.  No evidence of pulmonary embolus.   2.  Interlobular septal thickening, moderate bilateral pleural effusions, and hazy diffuse groundglass opacities throughout the bilateral lungs in the setting of cardiomegaly are most consistent with pulmonary edema/volume overload. Imaging features are  atypical or uncommonly reported for COVID19 pneumonia.  3. Endotracheal tube and enteric tube in place.  4. There are scattered  pulmonary nodules within the right middle lobe and lingula. Fleischner Society 2017 guidelines for management of incidentally detected solid pulmonary nodules in adults (does not apply to patients younger than 35 years, CT lung  cancer screening, patients with immunosuppression or patients with known primary cancer): Multiple solid nodules 6-8 mm: For low risk patients CT at 3-6 months then consider CT at 18-24 months. For high risk patients CT at 3-6 months then at 18-24  months.    Electronically signed by Lindsay Black MD on 3/26/2020 6:28 PM  Exam End: 03/26/20 18:08            Current Assessment & Plan     Recommend CT chest for surveillance. Patient states she would like to think about it. Her  sees pulmonologist, Dr. Olivia Umaña. She would like a referral to see this pulmonologist. Referral placed.          Relevant Orders    Ambulatory referral/consult to Pulmonology       Cardiac/Vascular    Hyperlipidemia associated with type 2 diabetes mellitus (Chronic)    Overview     October 2024: triglycerides remain elevated. LDL well controlled on repatha. Unable to tolerate statins and reports did not tolerate fish oil. Reports brain fog with fenofibrate.     CHRONIC.  July 2023:  Patient now on Repatha doing well.  She is following with Jesús Viera MD : Cardiology     July 2022:  Patient could not tolerate Pravastatin due to brain fog.  Uncontrolled.  Patient reports that she cannot tolerate rosuvastatin due to side effects.. Lab analysis reviewed.   (-) CP, SOB, abdominal pain, N/V/D, constipation, jaundice, skin changes.  (-) Myalgias  Lab Results   Component Value Date    CHOL 141 01/11/2024    CHOL 139 11/28/2023    CHOL 131 07/06/2023     Lab Results   Component Value Date    HDL 43 01/11/2024    HDL 40 11/28/2023    HDL 40 07/06/2023     Lab Results   Component Value Date    LDLCALC 47.2 (L) 01/11/2024    LDLCALC 53 11/28/2023    LDLCALC 26.8 (L) 07/06/2023     Lab Results   Component  Value Date    TRIG 254 (H) 01/11/2024    TRIG 230 (H) 11/28/2023    TRIG 321 (H) 07/06/2023     Lab Results   Component Value Date    CHOLHDL 30.5 01/11/2024    CHOLHDL 3.5 11/28/2023    CHOLHDL 30.5 07/06/2023     Lab Results   Component Value Date    TOTALCHOLEST 3.3 01/11/2024    TOTALCHOLEST 3.3 07/06/2023    TOTALCHOLEST 3.1 01/20/2023     Lab Results   Component Value Date    ALT 23 01/11/2024    AST 27 01/11/2024    ALKPHOS 48 (L) 01/11/2024    BILITOT 0.4 01/11/2024     ======================================================  The ASCVD Risk score (Juan F PILLAI, et al., 2019) failed to calculate for the following reasons:    The 2019 ASCVD risk score is only valid for ages 40 to 79           Current Assessment & Plan     Trial of Vascepa. Follow up with cardiology next month as scheduled. Continue exercise.  Low-fat high-fiber diet.  Continue Repatha.  Follow-up with Cardiology. Counseled on hyperlipidemia disease course, healthy diet and increased need for exercise.  Please be advised of the risk of cardiovascular disease, increase stroke and heart attack risk with uncontrolled/untreated hyperlipidemia. Patient voiced understanding and understood the treatment plan. All questions were answered.            Hypertriglyceridemia    Overview     See hyperlipidemia problem.          Current Assessment & Plan     Patient unable to tolerate fish oil over-the-counter. Will try Vascepa.             Endocrine    Type 2 diabetes mellitus with hyperglycemia, without long-term current use of insulin (Chronic)    Overview     Reviewed labs. A1C stable.    Diabetes Medications               JARDIANCE 25 mg tablet Take 1 tablet (25 mg total) by mouth once daily.     Diabetes Management Status  Statin: Not taking  ACE/ARB: Not taking    Screening or Prevention Patient's value Goal Complete/Controlled?   HgA1C Testing and Control   Lab Results   Component Value Date    HGBA1C 7.1 (H) 10/02/2024      Annually/Less than 8% Yes  "  Lipid profile : 10/02/2024 Annually Yes   LDL control Lab Results   Component Value Date    LDLCALC 26.4 (L) 10/02/2024    Annually/Less than 100 mg/dl  Yes   Nephropathy screening Lab Results   Component Value Date    LABMICR <5.0 02/07/2024     Lab Results   Component Value Date    PROTEINUA Negative 01/11/2024     No results found for: "UTPCR"   Annually Yes   Blood pressure BP Readings from Last 1 Encounters:   10/07/24 132/64    Less than 140/90 Yes   Dilated retinal exam : 10/02/2023 Annually Yes   Foot exam   Most Recent Foot Exam Date: Not Found Annually No                Current Assessment & Plan     Continue Jardiance.  Patient doing well with controlling A1c less than 8.0. We will plan to monitor hemoglobin A1c at designated intervals 3 to 6 months.  I recommend ongoing Education for diabetic diet and exercise protocol.  We will continue to monitor for side effects.    Please be advised of symptoms to monitor for and to notify me immediately if persistent or worsening.  Follow up with Ophthalmology/Optometry and Podiatry at least annually.           Follow up in about 6 months (around 4/7/2025), or if symptoms worsen or fail to improve.  ER precautions for severe or worsening symptoms.     Cristina Valle NP  _____________________________________________________________________________________________________________________________________________________    History of Present Illness    CHIEF COMPLAINT:  Ms. Braun presents today for follow up on lab results.    LAB RESULTS:  She reports elevated triglycerides and significantly reduced LDL cholesterol. Her A1C has been stable at 7.1. Kidney and liver function tests are normal. Blood sugar is slightly elevated.    MEDICATIONS:  She continues Repatha as prescribed by her cardiologist. She discontinued a fibrate medication prescribed by  Dr. Viera due to head fog. She reports similar side effects with multiple cholesterol medications in the past. She is " unable to tolerate statins and fish oil as well. She reports having a follow up with cardiology next month.     PULMONARY:  An incidental finding of scattered pulmonary nodules within the right lobe, with multiple solid nodules measuring 6-8 mm, was discovered during a CT in March 2020. A follow-up scan was recommended for 18-24 months after the initial finding, but she never had it done and does not recall being contacted about it. She denies any current breathing issues, wheezing, coughing, shortness of breath, fever, chills, weight loss. Her father smoked heavily during her childhood, including inside the house. She is a nonsmoker. She notes that her  sees pulmonogist, Dr. Olivia Umaña. She would like a referral to this pulmonologist as well.      Past Medical History:  Past Medical History:   Diagnosis Date    Cataract     OU    Chronic heart failure with preserved ejection fraction 3/29/2021    Chronic systolic heart failure     Coronary artery disease involving native coronary artery of native heart without angina pectoris 4/7/2020    Hypertension associated with diabetes 12/8/2015    Ischemic cardiomyopathy     Macrocytosis without anemia 3/26/2021    Mixed hyperlipidemia     Multiple pulmonary nodules 3/26/2020    NSTEMI (non-ST elevated myocardial infarction) 3/26/2020    Post-menopause on HRT (hormone replacement therapy) 9/17/2020    Squamous cell carcinoma of skin     right scalp and mid nasal bridge     Past Surgical History:   Procedure Laterality Date    CARDIAC CATHETERIZATION  04/02/2020    [place]:  Dr Viera    CATARACT EXTRACTION, BILATERAL      CORONARY ARTERY BYPASS GRAFT  06/10/2020    HYSTERECTOMY      age 38 TAHBSO on patch for 27 years    SKIN BIOPSY       Review of patient's allergies indicates:   Allergen Reactions    Pravastatin      Brain fog    Rosuvastatin      Dizziness       Social History     Tobacco Use    Smoking status: Never    Smokeless tobacco: Never   Substance  Use Topics    Alcohol use: No    Drug use: Never     Family History   Problem Relation Name Age of Onset    No Known Problems Mother      No Known Problems Father      Skin cancer Neg Hx      Melanoma Neg Hx       Current Outpatient Medications on File Prior to Visit   Medication Sig Dispense Refill    acetaminophen (TYLENOL) 325 MG tablet Take 650 mg by mouth.      albuterol (VENTOLIN HFA) 90 mcg/actuation inhaler Inhale 2 puffs into the lungs every 6 (six) hours as needed for Wheezing. Rescue 18 g 0    aspirin 81 MG Chew Take 81 mg by mouth.      blood sugar diagnostic Strp TEST BLOOD SUGAR ONCE DAILY 300 each 3    COMIRNATY 2023-24, 12Y UP,,PF, 30 mcg/0.3 mL injection       fluticasone propionate (FLONASE) 50 mcg/actuation nasal spray use 2 sprays (100 mcg total) by Each Nostril route once daily. 48 g 2    hydroCHLOROthiazide (MICROZIDE) 12.5 mg capsule Take 12.5 mg by mouth.      ibuprofen (ADVIL,MOTRIN) 400 MG tablet Take 1 tablet (400 mg total) by mouth every 6 (six) hours as needed for Other. 30 tablet 0    JARDIANCE 25 mg tablet Take 1 tablet (25 mg total) by mouth once daily. 90 tablet 0    Lactobacillus rhamnosus GG (CULTURELLE) 10 billion cell capsule Take 1 capsule by mouth once daily.      metoprolol succinate (TOPROL-XL) 100 MG 24 hr tablet Take 1 tablet (100 mg total) by mouth daily      multivitamin capsule Take 1 capsule by mouth once daily.      omega-3 fatty acids-fish oil 340-1,000 mg Cap Take 1 capsule by mouth once daily. 90 capsule 3    REPATHA SURECLICK 140 mg/mL PnIj       vitamins  A,C,E-zinc-copper (PRESERVISION AREDS) 14,320-226-200 unit-mg-unit Cap Take 1 capsule by mouth once daily.      furosemide (LASIX) 20 MG tablet Take 1 tablet (20 mg total) by mouth once daily. 30 tablet 0     No current facility-administered medications on file prior to visit.     Review of Systems   Constitutional:  Negative for appetite change, chills, fatigue and fever.   HENT:  Negative for congestion,  "rhinorrhea and sore throat.    Eyes:  Negative for visual disturbance.   Respiratory:  Negative for cough and shortness of breath.    Cardiovascular:  Negative for chest pain, palpitations and leg swelling.   Gastrointestinal:  Negative for abdominal pain, diarrhea and vomiting.   Genitourinary:  Negative for difficulty urinating, dysuria and hematuria.   Musculoskeletal:  Negative for arthralgias and myalgias.   Skin:  Negative for rash and wound.   Neurological:  Negative for dizziness and headaches.   Psychiatric/Behavioral:  Negative for behavioral problems. The patient is not nervous/anxious.      Vitals:    10/07/24 1328   BP: 132/64   Pulse: 69   Resp: 17   Temp: 97.9 °F (36.6 °C)   TempSrc: Oral   SpO2: 96%   Weight: 56.7 kg (125 lb 1.6 oz)   Height: 5' 1" (1.549 m)     Wt Readings from Last 3 Encounters:   10/07/24 56.7 kg (125 lb 1.6 oz)   09/26/24 57.7 kg (127 lb 3.2 oz)   02/07/24 57.3 kg (126 lb 4.8 oz)     Physical Exam  Vitals reviewed.   Constitutional:       General: She is not in acute distress.     Appearance: Normal appearance. She is not ill-appearing.   HENT:      Head: Normocephalic and atraumatic.      Right Ear: External ear normal.      Left Ear: External ear normal.      Nose: Nose normal.   Eyes:      Extraocular Movements: Extraocular movements intact.      Conjunctiva/sclera: Conjunctivae normal.   Cardiovascular:      Rate and Rhythm: Normal rate.      Heart sounds: Normal heart sounds.   Pulmonary:      Effort: Pulmonary effort is normal. No respiratory distress.      Breath sounds: Normal breath sounds.   Abdominal:      General: Abdomen is flat. There is no distension.   Musculoskeletal:         General: Normal range of motion.      Cervical back: Normal range of motion.   Skin:     General: Skin is warm and dry.      Capillary Refill: Capillary refill takes less than 2 seconds.      Coloration: Skin is not pale.      Findings: No rash.   Neurological:      General: No focal deficit " present.      Mental Status: She is alert and oriented to person, place, and time. Mental status is at baseline.   Psychiatric:         Mood and Affect: Mood normal.         Speech: Speech normal. Speech is not rapid and pressured, delayed or slurred.         Behavior: Behavior normal. Behavior is not agitated, slowed, aggressive, withdrawn, hyperactive or combative. Behavior is cooperative.         Thought Content: Thought content normal.         Judgment: Judgment normal.       Health Maintenance   Topic Date Due    Eye Exam  10/02/2024    Shingles Vaccine (1 of 2) 03/26/2025 (Originally [birth year]/2/1995)    TETANUS VACCINE  09/09/2029 (Originally 3/2/1963)    Hemoglobin A1c  04/02/2025    Aspirin/Antiplatelet Therapy  09/26/2025    Lipid Panel  10/02/2025    DEXA Scan  02/20/2026     DISCLAIMER: This note was compiled by using a speech recognition dictation system and therefore please be aware that typographical / speech recognition errors can and do occur.  Please contact me if you see any errors specifically.  Consent was obtained for [employer] recording system prior to the visit.

## 2024-10-14 ENCOUNTER — OFFICE VISIT (OUTPATIENT)
Dept: OPHTHALMOLOGY | Facility: CLINIC | Age: 79
End: 2024-10-14
Payer: MEDICARE

## 2024-10-14 DIAGNOSIS — H35.52 RETINITIS PIGMENTOSA, BOTH EYES: Chronic | ICD-10-CM

## 2024-10-14 DIAGNOSIS — H35.3132 NONEXUDATIVE AGE-RELATED MACULAR DEGENERATION, BILATERAL, INTERMEDIATE DRY STAGE: Primary | ICD-10-CM

## 2024-10-14 PROCEDURE — 92201 OPSCPY EXTND RTA DRAW UNI/BI: CPT | Mod: HCNC,S$GLB,, | Performed by: OPHTHALMOLOGY

## 2024-10-14 PROCEDURE — 92014 COMPRE OPH EXAM EST PT 1/>: CPT | Mod: HCNC,S$GLB,, | Performed by: OPHTHALMOLOGY

## 2024-10-14 PROCEDURE — 1159F MED LIST DOCD IN RCRD: CPT | Mod: HCNC,CPTII,S$GLB, | Performed by: OPHTHALMOLOGY

## 2024-10-14 PROCEDURE — 99999 PR PBB SHADOW E&M-EST. PATIENT-LVL III: CPT | Mod: PBBFAC,HCNC,, | Performed by: OPHTHALMOLOGY

## 2024-10-14 PROCEDURE — 92134 CPTRZ OPH DX IMG PST SGM RTA: CPT | Mod: HCNC,S$GLB,, | Performed by: OPHTHALMOLOGY

## 2024-10-14 PROCEDURE — 1101F PT FALLS ASSESS-DOCD LE1/YR: CPT | Mod: HCNC,CPTII,S$GLB, | Performed by: OPHTHALMOLOGY

## 2024-10-14 PROCEDURE — 3288F FALL RISK ASSESSMENT DOCD: CPT | Mod: HCNC,CPTII,S$GLB, | Performed by: OPHTHALMOLOGY

## 2024-10-14 PROCEDURE — 1160F RVW MEDS BY RX/DR IN RCRD: CPT | Mod: HCNC,CPTII,S$GLB, | Performed by: OPHTHALMOLOGY

## 2024-10-14 NOTE — PROGRESS NOTES
HPI     armd          dfe   oct          Additional comments: ARMD   OCT  &   DFE   Fuzzy  va ou     Macular atrophy   Para central fovea involvement  os       Dls  06/12/23            Last edited by Larissa Nance on 10/14/2024  8:20 AM.          OCT - fovea sparing macular atrophy  Some paracentral involvement OS      A/P    1. 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 Dry AMD component  Continue AREDS/AG    3. PCIOL OU  Some PCO OU increase OU and symptomatic  Send to Dr. Dimitrios Gaming for YAG    4. Blepharitis and dry eye  Mgmt per Dr. Joseline Armstrong, OD      1 year OCT    Letter to Mekhi Mansfield

## 2024-12-02 DIAGNOSIS — E11.65 TYPE 2 DIABETES MELLITUS WITH HYPERGLYCEMIA, WITHOUT LONG-TERM CURRENT USE OF INSULIN: Chronic | ICD-10-CM

## 2024-12-02 RX ORDER — EMPAGLIFLOZIN 25 MG/1
25 TABLET, FILM COATED ORAL
Qty: 30 TABLET | Refills: 12 | Status: SHIPPED | OUTPATIENT
Start: 2024-12-02

## 2024-12-02 NOTE — TELEPHONE ENCOUNTER
----- Message from Harika sent at 12/2/2024  8:45 AM CST -----  Contact: [phone] Patient  Requesting an RX refill or new RX.    Is this a refill or new RX: new    RX name and strength (copy/paste from chart):  JARDIANCE 25 mg tablet    Is this a 30 day or 90 day RX:     Pharmacy name and phone # (copy/paste from chart):    [facility] - [place] - [address] [facility]  [address] [facility] [address]  Phone: [phone] Fax: [phone]

## 2024-12-26 RX ORDER — ALBUTEROL SULFATE 90 UG/1
2 INHALANT RESPIRATORY (INHALATION) EVERY 6 HOURS PRN
Qty: 18 G | Refills: 0 | Status: SHIPPED | OUTPATIENT
Start: 2024-12-26

## 2024-12-26 NOTE — TELEPHONE ENCOUNTER
Care Due:                  Date            Visit Type   Department     Provider  --------------------------------------------------------------------------------                                EP -                              PRIMARY      [facility] FAMILY  Last Visit: 02-      CARE ([facility])   MEDICINE       Pablito Jean  Next Visit: None Scheduled  None         None Found                                                            Last  Test          Frequency    Reason                     Performed    Due Date  --------------------------------------------------------------------------------    Office Visit  12 months..  ibuprofen................  02- 02-    CBC.........  12 months..  ibuprofen................  01- 01-    [employer] Embedded Care Due Messages. Reference number: [id number].   12/26/2024 10:17:04 AM CST

## 2024-12-26 NOTE — TELEPHONE ENCOUNTER
----- Message from Mar sent at 12/26/2024  9:54 AM CST -----  Contact: joaquin  Type:  RX Refill Request    Who Called: joaquin   Refill or New Rx: refill  RX Name and Strength: albuterol (VENTOLIN HFA) 90 mcg/actuation inhaler   How is the patient currently taking it? (ex. 1XDay):  Is this a 30 day or 90 day RX:  Preferred Pharmacy with phone number:   [facility] [address] [facility]  [address] [facility] [address]  Phone: [phone] Fax: [phone]    Local or Mail Order:local   Ordering Provider:  Would the patient rather a call back or a response via [facility]? call  Best Call Back Number:[phone]   Additional Information:

## 2025-05-06 ENCOUNTER — TELEPHONE (OUTPATIENT)
Dept: FAMILY MEDICINE | Facility: CLINIC | Age: 80
End: 2025-05-06
Payer: MEDICARE

## 2025-05-06 ENCOUNTER — OFFICE VISIT (OUTPATIENT)
Dept: FAMILY MEDICINE | Facility: CLINIC | Age: 80
End: 2025-05-06
Payer: MEDICARE

## 2025-05-06 ENCOUNTER — RESULTS FOLLOW-UP (OUTPATIENT)
Dept: FAMILY MEDICINE | Facility: CLINIC | Age: 80
End: 2025-05-06

## 2025-05-06 VITALS
HEART RATE: 75 BPM | HEIGHT: 61 IN | OXYGEN SATURATION: 97 % | DIASTOLIC BLOOD PRESSURE: 72 MMHG | BODY MASS INDEX: 22.94 KG/M2 | WEIGHT: 121.5 LBS | SYSTOLIC BLOOD PRESSURE: 118 MMHG

## 2025-05-06 DIAGNOSIS — R30.0 DYSURIA: ICD-10-CM

## 2025-05-06 DIAGNOSIS — E11.69 HYPERLIPIDEMIA ASSOCIATED WITH TYPE 2 DIABETES MELLITUS: ICD-10-CM

## 2025-05-06 DIAGNOSIS — L98.9 SKIN LESION: ICD-10-CM

## 2025-05-06 DIAGNOSIS — E11.65 TYPE 2 DIABETES MELLITUS WITH HYPERGLYCEMIA, WITHOUT LONG-TERM CURRENT USE OF INSULIN: Chronic | ICD-10-CM

## 2025-05-06 DIAGNOSIS — N39.0 URINARY TRACT INFECTION WITHOUT HEMATURIA, SITE UNSPECIFIED: Primary | ICD-10-CM

## 2025-05-06 DIAGNOSIS — I50.32 CHRONIC HEART FAILURE WITH PRESERVED EJECTION FRACTION: ICD-10-CM

## 2025-05-06 DIAGNOSIS — J30.2 SEASONAL ALLERGIES: ICD-10-CM

## 2025-05-06 DIAGNOSIS — E78.5 HYPERLIPIDEMIA ASSOCIATED WITH TYPE 2 DIABETES MELLITUS: ICD-10-CM

## 2025-05-06 DIAGNOSIS — Z79.899 ENCOUNTER FOR LONG-TERM (CURRENT) USE OF MEDICATIONS: ICD-10-CM

## 2025-05-06 PROBLEM — I50.20 HFREF (HEART FAILURE WITH REDUCED EJECTION FRACTION): Status: ACTIVE | Noted: 2025-01-30

## 2025-05-06 LAB
BACTERIA #/AREA URNS HPF: ABNORMAL /HPF
BILIRUB UR QL STRIP.AUTO: ABNORMAL
CLARITY UR: CLEAR
COLOR UR AUTO: ABNORMAL
GLUCOSE UR QL STRIP: ABNORMAL
HGB UR QL STRIP: ABNORMAL
HOLD SPECIMEN: NORMAL
KETONES UR QL STRIP: ABNORMAL
LEUKOCYTE ESTERASE UR QL STRIP: ABNORMAL
MICROSCOPIC COMMENT: ABNORMAL
NITRITE UR QL STRIP: ABNORMAL
PH UR STRIP: ABNORMAL [PH]
PROT UR QL STRIP: ABNORMAL
RBC #/AREA URNS HPF: 20 /HPF (ref 0–4)
SP GR UR STRIP: ABNORMAL
SQUAMOUS #/AREA URNS HPF: 3 /HPF
UROBILINOGEN UR STRIP-ACNC: ABNORMAL EU/DL
WBC #/AREA URNS HPF: >100 /HPF (ref 0–5)
WBC CLUMPS URNS QL MICRO: ABNORMAL

## 2025-05-06 PROCEDURE — 99999 PR PBB SHADOW E&M-EST. PATIENT-LVL V: CPT | Mod: PBBFAC,HCNC,, | Performed by: FAMILY MEDICINE

## 2025-05-06 PROCEDURE — 81002 URINALYSIS NONAUTO W/O SCOPE: CPT | Mod: HCNC | Performed by: FAMILY MEDICINE

## 2025-05-06 PROCEDURE — 3288F FALL RISK ASSESSMENT DOCD: CPT | Mod: CPTII,HCNC,S$GLB, | Performed by: FAMILY MEDICINE

## 2025-05-06 PROCEDURE — 3078F DIAST BP <80 MM HG: CPT | Mod: CPTII,HCNC,S$GLB, | Performed by: FAMILY MEDICINE

## 2025-05-06 PROCEDURE — 87086 URINE CULTURE/COLONY COUNT: CPT | Mod: HCNC | Performed by: FAMILY MEDICINE

## 2025-05-06 PROCEDURE — 3074F SYST BP LT 130 MM HG: CPT | Mod: CPTII,HCNC,S$GLB, | Performed by: FAMILY MEDICINE

## 2025-05-06 PROCEDURE — 99214 OFFICE O/P EST MOD 30 MIN: CPT | Mod: HCNC,S$GLB,, | Performed by: FAMILY MEDICINE

## 2025-05-06 PROCEDURE — 1101F PT FALLS ASSESS-DOCD LE1/YR: CPT | Mod: CPTII,HCNC,S$GLB, | Performed by: FAMILY MEDICINE

## 2025-05-06 PROCEDURE — 1126F AMNT PAIN NOTED NONE PRSNT: CPT | Mod: CPTII,HCNC,S$GLB, | Performed by: FAMILY MEDICINE

## 2025-05-06 PROCEDURE — G2211 COMPLEX E/M VISIT ADD ON: HCPCS | Mod: HCNC,S$GLB,, | Performed by: FAMILY MEDICINE

## 2025-05-06 RX ORDER — PHENAZOPYRIDINE HYDROCHLORIDE 200 MG/1
200 TABLET, FILM COATED ORAL 3 TIMES DAILY PRN
Qty: 9 TABLET | Refills: 0 | Status: SHIPPED | OUTPATIENT
Start: 2025-05-06 | End: 2025-05-09

## 2025-05-06 RX ORDER — CIPROFLOXACIN 500 MG/1
500 TABLET ORAL 2 TIMES DAILY
Qty: 14 TABLET | Refills: 0 | Status: SHIPPED | OUTPATIENT
Start: 2025-05-06 | End: 2025-05-13

## 2025-05-06 RX ORDER — FLUTICASONE PROPIONATE 50 MCG
2 SPRAY, SUSPENSION (ML) NASAL DAILY
Qty: 48 G | Refills: 0 | Status: SHIPPED | OUTPATIENT
Start: 2025-05-06

## 2025-05-06 NOTE — TELEPHONE ENCOUNTER
----- Message from Masabi sent at 5/6/2025  8:07 AM CDT -----  Contact: DEBRA MAHARAJ [[id number]]  ..Type:  Patient Requesting CallWho Called:DEBRA MAHARAJ [[id number]]Would the patient rather a call back or a response via [facility]? [facility] Call Back Number:.[phone] Information: Patient called to discuss getting a medication prescribed for a UTI.

## 2025-05-06 NOTE — PATIENT INSTRUCTIONS
Follow up in about 6 months (around 11/6/2025), or if symptoms worsen or fail to improve.     Dear patient,   As a result of recent federal legislation (The Federal Cures Act), you may receive lab or pathology results from your visit in your [facility] account before your physician is able to contact you. Your physician or their representative will relay the results to you with their recommendations at their soonest availability.     If no improvement in symptoms or symptoms worsen, please be advised to call MD, follow-up at clinic and/or go to ER if becomes severe.    Pablito Jean M.D.        We Offer TELEHEALTH & Same Day Appointments!   Book your Telehealth appointment with me through my nurse or   Clinic appointments on [employer]!    [address] [place] [address]    Office: [phone]   FAX: [phone]    Check out my Facebook Page and Follow Me at: [email or web address]    Check out my website at [employer] by clicking on: [email or web address]    To Schedule appointments online, go to [email or web address]: [email or web address]

## 2025-05-06 NOTE — PROGRESS NOTES
Please call to make sure patient get the results.  Saumya, Your urinalysis is abnormal.  Showing infection, I have sent antibiotic ciprofloxacin to the pharmacy.  Please pick this medication up and start taking it as prescribed.  Follow-up sooner if no improvement or symptoms worsen.  [phone]

## 2025-05-08 LAB — BACTERIA UR CULT: ABNORMAL

## 2025-05-08 NOTE — PROGRESS NOTES
The urine culture is starting to grow bacteria.  Please make sure that the patient is taking the antibiotic ciprofloxacin as prescribed.  Follow-up sooner if no improvement.  Further recommendations will follow with the final results of the urine culture.  ER precautions for severe symptoms.

## 2025-05-09 NOTE — PROGRESS NOTES
1st check to see if patient has seen the results.  If not then  CALL patient with results and Document verification.  Schedule follow-up if needed.  [phone]    Shingles Vaccine(1 of 2) Never done  Diabetes Urine Screening due on 02/07/2025  Hemoglobin A1c due on 04/02/2025      Urine culture has confirmed E coli infection that is sensitive to ciprofloxacin.  Please make sure that the patient is taking the medication as prescribed.  Follow-up sooner if no improvement.  ER precautions for severe symptoms.

## 2025-05-22 ENCOUNTER — LAB VISIT (OUTPATIENT)
Dept: LAB | Facility: HOSPITAL | Age: 80
End: 2025-05-22
Attending: FAMILY MEDICINE
Payer: MEDICARE

## 2025-05-22 DIAGNOSIS — E11.65 TYPE 2 DIABETES MELLITUS WITH HYPERGLYCEMIA, WITHOUT LONG-TERM CURRENT USE OF INSULIN: Chronic | ICD-10-CM

## 2025-05-22 LAB
ALBUMIN/CREAT UR: 12.6 UG/MG
CREAT UR-MCNC: 87 MG/DL (ref 15–325)
EAG (OHS): 146 MG/DL (ref 68–131)
HBA1C MFR BLD: 6.7 % (ref 4–5.6)
MICROALBUMIN UR-MCNC: 11 UG/ML (ref ?–5000)

## 2025-05-22 PROCEDURE — 83036 HEMOGLOBIN GLYCOSYLATED A1C: CPT | Mod: HCNC

## 2025-05-22 PROCEDURE — 82570 ASSAY OF URINE CREATININE: CPT | Mod: HCNC

## 2025-05-23 NOTE — PROGRESS NOTES
Make follow-up lab appointment per recommendation below.  Check to see if patient has seen the results through my chart.  If not then,  #CALL THE PATIENT# to discuss results/see if they have questions and document verification of contact. Make F/U appt if needed. [phone]      #My interpretation that was sent to them through [employer]:  Saumya, I have reviewed your recent blood work.     The urine microalbumin creatinine ratio is normal.  Repeat annually.  Your hemoglobin A1c is improved.  Keep up the great work!  This test is gold standard screening test for diabetes. It is a measures 3 months of your average blood sugar.  Diabetes Medications              JARDIANCE 25 mg tablet Take 1 tablet (25 mg total) by mouth once daily.          =========================  Also please address any outstanding health maintenance that may be due:   Health Maintenance Due   Topic Date Due    Shingles Vaccine (1 of 2) Never done

## 2025-05-26 ENCOUNTER — HOSPITAL ENCOUNTER (OUTPATIENT)
Dept: RADIOLOGY | Facility: HOSPITAL | Age: 80
Discharge: HOME | End: 2025-05-26
Attending: NURSE PRACTITIONER
Payer: MEDICARE

## 2025-05-26 ENCOUNTER — TELEPHONE (OUTPATIENT)
Dept: FAMILY MEDICINE | Facility: CLINIC | Age: 80
End: 2025-05-26

## 2025-05-26 ENCOUNTER — OFFICE VISIT (OUTPATIENT)
Dept: FAMILY MEDICINE | Facility: CLINIC | Age: 80
End: 2025-05-26
Payer: MEDICARE

## 2025-05-26 ENCOUNTER — RESULTS FOLLOW-UP (OUTPATIENT)
Dept: FAMILY MEDICINE | Facility: CLINIC | Age: 80
End: 2025-05-26

## 2025-05-26 VITALS
BODY MASS INDEX: 22.88 KG/M2 | SYSTOLIC BLOOD PRESSURE: 132 MMHG | OXYGEN SATURATION: 98 % | DIASTOLIC BLOOD PRESSURE: 60 MMHG | TEMPERATURE: 98 F | HEIGHT: 61 IN | WEIGHT: 121.19 LBS | HEART RATE: 63 BPM | RESPIRATION RATE: 16 BRPM

## 2025-05-26 DIAGNOSIS — S72.002D CLOSED FRACTURE OF LEFT HIP WITH ROUTINE HEALING, SUBSEQUENT ENCOUNTER: Primary | ICD-10-CM

## 2025-05-26 DIAGNOSIS — M25.552 PAIN OF LEFT HIP: ICD-10-CM

## 2025-05-26 DIAGNOSIS — R25.2 LEG CRAMP: ICD-10-CM

## 2025-05-26 DIAGNOSIS — M25.552 PAIN OF LEFT HIP: Primary | ICD-10-CM
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
## 2025-05-26 DIAGNOSIS — R26.2 IMPAIRED AMBULATION: ICD-10-CM

## 2025-05-26 PROCEDURE — 1126F AMNT PAIN NOTED NONE PRSNT: CPT | Mod: CPTII,HCNC,S$GLB, | Performed by: NURSE PRACTITIONER

## 2025-05-26 PROCEDURE — 73502 X-RAY EXAM HIP UNI 2-3 VIEWS: CPT | Mod: 26,HCNC,LT, | Performed by: RADIOLOGY

## 2025-05-26 PROCEDURE — 1159F MED LIST DOCD IN RCRD: CPT | Mod: CPTII,HCNC,S$GLB, | Performed by: NURSE PRACTITIONER

## 2025-05-26 PROCEDURE — 99999 PR PBB SHADOW E&M-EST. PATIENT-LVL V: CPT | Mod: PBBFAC,HCNC,, | Performed by: NURSE PRACTITIONER

## 2025-05-26 PROCEDURE — 73502 X-RAY EXAM HIP UNI 2-3 VIEWS: CPT | Mod: TC,HCNC,PO,LT

## 2025-05-26 PROCEDURE — 1160F RVW MEDS BY RX/DR IN RCRD: CPT | Mod: CPTII,HCNC,S$GLB, | Performed by: NURSE PRACTITIONER

## 2025-05-26 PROCEDURE — 3078F DIAST BP <80 MM HG: CPT | Mod: CPTII,HCNC,S$GLB, | Performed by: NURSE PRACTITIONER

## 2025-05-26 PROCEDURE — 3075F SYST BP GE 130 - 139MM HG: CPT | Mod: CPTII,HCNC,S$GLB, | Performed by: NURSE PRACTITIONER

## 2025-05-26 PROCEDURE — 99213 OFFICE O/P EST LOW 20 MIN: CPT | Mod: HCNC,S$GLB,, | Performed by: NURSE PRACTITIONER

## 2025-05-26 PROCEDURE — 1101F PT FALLS ASSESS-DOCD LE1/YR: CPT | Mod: CPTII,HCNC,S$GLB, | Performed by: NURSE PRACTITIONER

## 2025-05-26 PROCEDURE — 3288F FALL RISK ASSESSMENT DOCD: CPT | Mod: CPTII,HCNC,S$GLB, | Performed by: NURSE PRACTITIONER

## 2025-05-26 RX ORDER — CANE
1 EACH MISCELLANEOUS DAILY PRN
Qty: 1 EACH | Refills: 0 | Status: SHIPPED | OUTPATIENT
Start: 2025-05-26

## 2025-05-26 RX ORDER — BACLOFEN 10 MG/1
10 TABLET ORAL NIGHTLY PRN
Qty: 7 TABLET | Refills: 0 | Status: SHIPPED | OUTPATIENT
Start: 2025-05-26 | End: 2025-06-02

## 2025-05-26 NOTE — TELEPHONE ENCOUNTER
Copied from CRM #[id number]. Topic: General Inquiry - Return Call  >> May 26, 2025  3:05 PM Iniki wrote:  .Type:  Patient Returning Call    Who Called:patient  Who Left Message for Patient:zoë bello  Does the patient know what this is regarding?:no  Would the patient rather a call back or a response via [employer]? call  Best Call Back Number:.[phone]    Additional Information:

## 2025-05-26 NOTE — PATIENT INSTRUCTIONS
"Hydrate well  Rest  Baclofen causes drowsiness  Report to ER immediately if symptoms worsen or persist    Baclofen: Patient drug information                   2025© UpToDate, Inc. and its affiliates and/or licensors. All Rights Reserved.  Access [employer] for additional drug information, tools, and databases.  Contributor Disclosures  For additional information see "Baclofen: Drug information" and "Baclofen: Pediatric drug information"    You must carefully read the "Consumer Information Use and Disclaimer" below in order to understand and correctly use this information.  Brand Names: US  Baclofen Refill Kit-SynchroMed;     Fleqsuvy;     Gablofen;     Lioresal [DSC];     Lyvispah;     Ozobax DS;     Ozobax [DSC]    Brand Names: Elvira  APO-Baclofen;     Baclofen-10;     Baclofen-20;     Lioresal Intrathecal [DSC];     MYLAN-Baclofen;     PMS-Baclofen;     BALTAZAR-Baclofen    Warning  Infusion:  Unsafe side effects have happened when this drug was stopped all of a sudden. Some of these side effects have been high fever, mental changes, more spasms, and muscle stiffness. Rarely, these side effects have led to very bad muscle problems, organ problems, and death. Avoid stopping this drug all of a sudden without talking with your doctor. Be sure you get your pump refilled on time and you know about the pump alarms and what to do if the pump alarm goes off. Tell your doctor if you have ever had signs of withdrawal while getting baclofen tablets or shot. Call your doctor right away if you have signs of withdrawal.  Read the package insert for more details.    What is this drug used for?  It is used to treat spasms in patients with MS (multiple sclerosis) or spinal cord problems.  It may be given to you for other reasons. Talk with the doctor.    What do I need to tell my doctor BEFORE I take this drug?  All products:  If you are allergic to this drug; any part of this drug; or any other drugs, foods, or substances. " Tell your doctor about the allergy and what signs you had.  Infusion:  If you have an infection.  This is not a list of all drugs or health problems that interact with this drug.  Tell your doctor and pharmacist about all of your drugs (prescription or OTC, natural products, vitamins) and health problems. You must check to make sure that it is safe for you to take this drug with all of your drugs and health problems. Do not start, stop, or change the dose of any drug without checking with your doctor.    What are some things I need to know or do while I take this drug?  Tell all of your health care providers that you take this drug. This includes your doctors, nurses, pharmacists, and dentists.  Avoid driving and doing other tasks or actions that call for you to be alert until you see how this drug affects you.  Talk with your doctor before you use alcohol, marijuana or other forms of cannabis, or prescription or OTC drugs that may slow your actions.  Do not stop taking this drug all of a sudden. You may have a greater risk of side effects. These may include hallucinations (seeing or hearing things that are not there), seizures, high fever, stiff muscles, and feeling confused. Rarely, this can lead to organ problems and even death. If you need to stop this drug, slowly stop it as ordered by your doctor. Talk with your doctor if you have any new or worsening signs.  If the patient is a child, use this drug with care. The risk of some side effects may be higher in children.  Tell your doctor if you are pregnant, plan on getting pregnant, or are breast-feeding. You will need to talk about the benefits and risks to you and the baby.  Taking this drug during pregnancy may lead to withdrawal in the .  Injection:  Talk with your doctor if this drug stops working well. Do not take more than ordered.    What are some side effects that I need to call my doctor about right away?  WARNING/CAUTION: Even though it may be  rare, some people may have very bad and sometimes deadly side effects when taking a drug. Tell your doctor or get medical help right away if you have any of the following signs or symptoms that may be related to a very bad side effect:  Signs of an allergic reaction, like rash; hives; itching; red, swollen, blistered, or peeling skin with or without fever; wheezing; tightness in the chest or throat; trouble breathing, swallowing, or talking; unusual hoarseness; or swelling of the mouth, face, lips, tongue, or throat.  Severe dizziness or passing out.  Feeling confused.  Mental, mood, or behavior changes that are new or worse.  Hallucinations (seeing or hearing things that are not there).  Seizures.  Change in balance.  Change in eyesight.  Chest pain.  Muscle pain or weakness.  Muscle stiffness.  A burning, numbness, or tingling feeling that is not normal.  Trouble breathing, slow breathing, or shallow breathing.  Not able to pass urine or change in how much urine is passed.  Change in how often urine is passed.  Blood in the urine.  Swelling in the arms or legs.  Trouble controlling body movements, twitching, change in balance, trouble swallowing or speaking.  Not able to control eye movements.  A heartbeat that does not feel normal.    What are some other side effects of this drug?  All drugs may cause side effects. However, many people have no side effects or only have minor side effects. Call your doctor or get medical help if any of these side effects or any other side effects bother you or do not go away:  Feeling dizzy, sleepy, tired, or weak.  Trouble sleeping.  Upset stomach or throwing up.  Headache.  Constipation.  These are not all of the side effects that may occur. If you have questions about side effects, call your doctor. Call your doctor for medical advice about side effects.  You may report side effects to your national health agency.    How is this drug best taken?  Use this drug as ordered by  your doctor. Read all information given to you. Follow all instructions closely.  Tablets:  Take with or without food.  All liquid products:  Measure liquid doses carefully. Use the measuring device that comes with this drug. If there is none, ask the pharmacist for a device to measure this drug.  Do not use a household teaspoon or tablespoon to measure this drug. Doing so could lead to the dose being too high.  Liquid (suspension):  Shake well before use.  Those who have feeding tubes may use some brands of this drug. If you have a feeding tube, be sure you know if your brand can be used. Take as you have been told. Flush the feeding tube after this drug is given.  Oral granules:  You may put this drug right into your mouth. The granules will dissolve in the mouth or you can swallow them. If needed, this drug can also be mixed in liquids or soft foods like applesauce, yogurt, or pudding. Mix 1 packet in up to 1 tablespoon (15 mL) of liquid or soft food. If your dose is more than 1 packet, mix each packet by itself. Take this drug within 2 hours after mixing.  Those who have feeding tubes may use this drug. Use as you have been told. Flush the feeding tube after this drug is given.  Infusion:  It is given into the spine.    What do I do if I miss a dose?  All oral products:  Take a missed dose as soon as you think about it.  If it is close to the time for your next dose, skip the missed dose and go back to your normal time.  Do not take 2 doses at the same time or extra doses.  Infusion:  Call your doctor to find out what to do.    How do I store and/or throw out this drug?  Tablets:  Store at room temperature in a dry place. Do not store in a bathroom.  If you split the tablets, you may need to throw them away after a certain amount of time. Be sure you know how long you can store the split tablets. If you are not sure, call the pharmacist.  Liquid (solution):  Some brands of this drug are stored in the  refrigerator. Some brands are stored at room temperature. Ask your pharmacist how to store this drug.  Do not freeze.  Protect from light.  Liquid (suspension):  Store at room temperature in a dry place. Do not store in a bathroom.  Throw away any unused portion 2 months after opening.  Oral granules:  Store at room temperature in a dry place. Do not store in a bathroom.  Infusion:  If you need to store this drug at home, talk with your doctor, nurse, or pharmacist about how to store it.  All products:  Keep all drugs in a safe place. Keep all drugs out of the reach of children and pets.  Throw away unused or  drugs. Do not flush down a toilet or pour down a drain unless you are told to do so. Check with your pharmacist if you have questions about the best way to throw out drugs. There may be drug take-back programs in your area.    General drug facts  If your symptoms or health problems do not get better or if they become worse, call your doctor.  Do not share your drugs with others and do not take anyone else's drugs.  Some drugs may have another patient information leaflet. If you have any questions about this drug, please talk with your doctor, nurse, pharmacist, or other health care provider.  If you think there has been an overdose, call your poison control center or get medical care right away. Be ready to tell or show what was taken, how much, and when it happened.

## 2025-05-26 NOTE — PROGRESS NOTES
"Subjective     Patient ID: Saumya Braun is a 80 y.o. female.    Chief Complaint: Extremity Weakness (BL LE )    Hip Injury  This is a chronic problem. The current episode started more than 1 month ago (pt states fell on left hip >6m ago; states has been walking abnormallly since the injury; states, "I waddle when I walk now"; states did not see doctor or go to ER following injury). The problem occurs daily. The problem has been unchanged. Associated symptoms include arthralgias (left hip). Pertinent negatives include no abdominal pain, anorexia, change in bowel habit, chest pain, chills, congestion, coughing, diaphoresis, fatigue, fever, headaches, joint swelling, myalgias, nausea, neck pain, numbness, rash, sore throat, swollen glands, urinary symptoms, vertigo, visual change, vomiting or weakness. Associated symptoms comments: Left hip pain, change in ambulation. Nothing aggravates the symptoms. She has tried nothing for the symptoms. The treatment provided no relief.   Spasms  This is a recurrent (Bilateral lower leg cramps at night) problem. The current episode started more than 1 month ago. The problem occurs intermittently. The problem has been unchanged. Associated symptoms include arthralgias (left hip). Pertinent negatives include no abdominal pain, anorexia, change in bowel habit, chest pain, chills, congestion, coughing, diaphoresis, fatigue, fever, headaches, joint swelling, myalgias, nausea, neck pain, numbness, rash, sore throat, swollen glands, urinary symptoms, vertigo, visual change, vomiting or weakness. Nothing aggravates the symptoms. She has tried nothing for the symptoms. The treatment provided no relief.     Past Medical History:   Diagnosis Date    Cataract     OU    Chronic heart failure with preserved ejection fraction 3/29/2021    Chronic systolic heart failure     Coronary artery disease involving native coronary artery of native heart without angina pectoris 4/7/2020    Hypertension " associated with diabetes 12/8/2015    Ischemic cardiomyopathy     Macrocytosis without anemia 3/26/2021    Mixed hyperlipidemia     Multiple pulmonary nodules 3/26/2020    NSTEMI (non-ST elevated myocardial infarction) 3/26/2020    Post-menopause on HRT (hormone replacement therapy) 9/17/2020    Squamous cell carcinoma of skin     right scalp and mid nasal bridge     Social History[1]  Past Surgical History:   Procedure Laterality Date    CARDIAC CATHETERIZATION  04/02/2020    [place]:  Dr Viera    CATARACT EXTRACTION, BILATERAL      CORONARY ARTERY BYPASS GRAFT  06/10/2020    HYSTERECTOMY      age 38 TAHBSO on patch for 27 years    SKIN BIOPSY         Review of Systems   Constitutional: Negative.  Negative for chills, diaphoresis, fatigue and fever.   HENT: Negative.  Negative for nasal congestion and sore throat.    Eyes: Negative.    Respiratory: Negative.  Negative for cough.    Cardiovascular: Negative.  Negative for chest pain.   Gastrointestinal: Negative.  Negative for abdominal pain, anorexia, change in bowel habit, nausea and vomiting.   Endocrine: Negative.    Genitourinary: Negative.    Musculoskeletal:  Positive for arthralgias (left hip). Negative for joint swelling, myalgias and neck pain.        Bilateral lower leg cramps at night   Integumentary:  Negative for rash. Negative.   Allergic/Immunologic: Negative.    Neurological: Negative.  Negative for vertigo, weakness, numbness and headaches.   Psychiatric/Behavioral: Negative.            Objective     Physical Exam  Vitals and nursing note reviewed.   Constitutional:       Appearance: Normal appearance.   HENT:      Head: Normocephalic.      Right Ear: Tympanic membrane, ear canal and external ear normal.      Left Ear: Tympanic membrane, ear canal and external ear normal.      Nose: Nose normal.      Mouth/Throat:      Mouth: Mucous membranes are moist.      Pharynx: Oropharynx is clear.   Eyes:      Conjunctiva/sclera: Conjunctivae normal.       Pupils: Pupils are equal, round, and reactive to light.   Cardiovascular:      Rate and Rhythm: Normal rate and regular rhythm.      Pulses: Normal pulses.      Heart sounds: Normal heart sounds.   Pulmonary:      Effort: Pulmonary effort is normal.      Breath sounds: Normal breath sounds.   Abdominal:      General: Bowel sounds are normal.      Palpations: Abdomen is soft.   Musculoskeletal:         General: Normal range of motion.      Cervical back: Normal range of motion and neck supple.      Left hip: Bony tenderness (with abduction) present. No deformity, lacerations, tenderness or crepitus. Normal range of motion. Normal strength.   Skin:     General: Skin is warm and dry.      Capillary Refill: Capillary refill takes 2 to 3 seconds.   Neurological:      Mental Status: She is alert and oriented to person, place, and time.   Psychiatric:         Mood and Affect: Mood normal.         Behavior: Behavior normal.         Thought Content: Thought content normal.         Judgment: Judgment normal.            Assessment and Plan     1. Pain of left hip  2. Fall, subsequent encounter  3. Leg cramp  4. Impaired ambulation  -     X-Ray Hip 2 or 3 views Left with Pelvis when performed; Future; Expected date: 05/26/2025  -     Potassium; Future; Expected date: 05/26/2025  -     Magnesium; Future; Expected date: 05/26/2025  -     baclofen (LIORESAL) 10 MG tablet; Take 1 tablet (10 mg total) by mouth nightly as needed.  Dispense: 7 tablet; Refill: 0  -     cane Kalyani; 1 each by Misc.(Non-Drug; Combo Route) route daily as needed.  Dispense: 1 each; Refill: 0  Hydrate well  Rest  Handout r/t baclofen uses, potential side effects/interactions given  Report to ER immediately if symptoms worsen or persist               No follow-ups on file.         [1]   Social History  Socioeconomic History    Marital status:    Tobacco Use    Smoking status: Never    Smokeless tobacco: Never   Substance and Sexual Activity    Alcohol  use: No    Drug use: Never    Sexual activity: Not Currently     Partners: Male     Birth control/protection: See Surgical Hx     Social Drivers of Health     Financial Resource Strain: Low Risk  (9/26/2024)    Overall Financial Resource Strain (CARDIA)     Difficulty of Paying Living Expenses: Not hard at all   Food Insecurity: No Food Insecurity (9/26/2024)    Hunger Vital Sign     Worried About Running Out of Food in the Last Year: Never true     Ran Out of Food in the Last Year: Never true   Transportation Needs: No Transportation Needs (9/26/2024)    PRAPARE - Transportation     Lack of Transportation (Medical): No     Lack of Transportation (Non-Medical): No   Physical Activity: Insufficiently Active (9/26/2024)    Exercise Vital Sign     Days of Exercise per Week: 2 days     Minutes of Exercise per Session: 30 min   Stress: No Stress Concern Present (9/26/2024)    [employer] [place] - Occupational Stress Questionnaire     Feeling of Stress : Not at all   Housing Stability: Unknown (9/26/2024)    Housing Stability Vital Sign     Unable to Pay for Housing in the Last Year: No     Homeless in the Last Year: No

## 2025-05-26 NOTE — TELEPHONE ENCOUNTER
Copied from CRM #[id number]. Topic: General Inquiry - Return Call  >> May 26, 2025  1:43 PM Lou wrote:  Type:  Patient Returning Call    Who Called:pt  Who Left Message for Patient:na  Does the patient know what this is regarding?:results  Would the patient rather a call back or a response via [facility]? call  Best Call Back Number:[phone]  Additional Information: returning missed call

## 2025-05-27 ENCOUNTER — TELEPHONE (OUTPATIENT)
Dept: FAMILY MEDICINE | Facility: CLINIC | Age: 80
End: 2025-05-27
Payer: MEDICARE

## 2025-05-27 DIAGNOSIS — S72.002D CLOSED FRACTURE OF LEFT HIP WITH ROUTINE HEALING, SUBSEQUENT ENCOUNTER: Primary | ICD-10-CM

## 2025-05-27 NOTE — TELEPHONE ENCOUNTER
Spoke with patient about this.  Referral faxed. States they wont make the appointment without the referral. She knows she needs to be seen ASAP and should not put any weight on this leg until seen.

## 2025-05-27 NOTE — TELEPHONE ENCOUNTER
Copied from CRM #[id number]. Topic: Appointments - Appointment Access  >> May 27, 2025  8:30 AM Linda wrote:  Type:  Patient Requesting Referral    Who Called:pt  Does the patient already have the specialty appointment scheduled?:no  If yes, what is the date of that appointment?:  Referral to What Specialty: Ortho  Reason for Referral: hip  Does the patient want the referral with a specific physician?:yes  Is the specialist an [facility]?:non  Patient Requesting a Response?: yes  Would the patient rather a call back or a response via [facility]? phone  Best Call Back Number: [phone]   Additional Information:   pt wants a referral sent to Dr. Olvera at [facility] in [place].  Fax# [phone]

## 2025-08-05 RX ORDER — ALBUTEROL SULFATE 90 UG/1
2 INHALANT RESPIRATORY (INHALATION) EVERY 6 HOURS PRN
Qty: 18 G | Refills: 0 | Status: SHIPPED | OUTPATIENT
Start: 2025-08-05

## 2025-08-05 NOTE — TELEPHONE ENCOUNTER
Care Due:                  Date            Visit Type   Department     Provider  --------------------------------------------------------------------------------                                EP -                              PRIMARY      [facility] FAMILY  Last Visit: 05-      CARE ([facility])   MEDICINE       Pablito Jean  Next Visit: None Scheduled  None         None Found                                                            Last  Test          Frequency    Reason                     Performed    Due Date  --------------------------------------------------------------------------------    CBC.........  12 months..  ibuprofen................  01- 01-    Cr..........  12 months..  ibuprofen................  10-   09-    [facility] Embedded Care Due Messages. Reference number: [id number].   8/05/2025 10:31:35 AM CDT

## 2025-08-05 NOTE — TELEPHONE ENCOUNTER
Copied from CRM #[id number]. Topic: Medications - Medication Refill  >> Aug 5, 2025  8:44 AM Martinez wrote:  .Type:  RX Refill Request    Who Called: Saumya Braun    Refill or New Rx:refill  RX Name and Strength:albuterol  albuterol (VENTOLIN HFA) 90 mcg/actuation inhaler    How is the patient currently taking it? (ex. 1XDay): as needed  Is this a 30 day or 90 day RX:30 day   Preferred Pharmacy with phone number:   [facility] - [place] - [address]  [address] [address] [address]  Phone: [phone] Fax: [phone]      Local or Mail Order:local  Ordering Provider:Cristina Valle NP  Would the patient rather a call back or a response via [facility]? both  Best Call Back Number:[phone]    Additional Information: refill

## 2025-08-26 ENCOUNTER — E-VISIT (OUTPATIENT)
Dept: FAMILY MEDICINE | Facility: CLINIC | Age: 80
End: 2025-08-26
Payer: MEDICARE

## 2025-08-26 ENCOUNTER — TELEPHONE (OUTPATIENT)
Dept: FAMILY MEDICINE | Facility: CLINIC | Age: 80
End: 2025-08-26
Payer: MEDICARE

## 2025-08-26 DIAGNOSIS — M54.2 NECK PAIN ON LEFT SIDE: ICD-10-CM

## 2025-08-26 DIAGNOSIS — N30.80 ACUTE BACTERIAL SIMPLE CYSTITIS: ICD-10-CM

## 2025-08-26 DIAGNOSIS — R30.0 DYSURIA: Primary | ICD-10-CM

## 2025-08-26 DIAGNOSIS — B96.89 ACUTE BACTERIAL SIMPLE CYSTITIS: ICD-10-CM

## 2025-08-26 LAB
BACTERIA #/AREA URNS HPF: ABNORMAL /HPF
BILIRUB UR QL STRIP.AUTO: NEGATIVE
CLARITY UR: ABNORMAL
COLOR UR AUTO: YELLOW
GLUCOSE UR QL STRIP: ABNORMAL
HGB UR QL STRIP: ABNORMAL
HYALINE CASTS #/AREA URNS LPF: 0 /LPF (ref 0–1)
KETONES UR QL STRIP: ABNORMAL
LEUKOCYTE ESTERASE UR QL STRIP: ABNORMAL
MICROSCOPIC COMMENT: ABNORMAL
NITRITE UR QL STRIP: POSITIVE
PH UR STRIP: 8 [PH]
PROT UR QL STRIP: ABNORMAL
RBC #/AREA URNS HPF: 50 /HPF (ref 0–4)
SP GR UR STRIP: 1
SQUAMOUS #/AREA URNS HPF: 4 /HPF
WBC #/AREA URNS HPF: 40 /HPF (ref 0–5)
YEAST URNS QL MICRO: ABNORMAL /HPF

## 2025-08-26 RX ORDER — IBUPROFEN 400 MG/1
400 TABLET, FILM COATED ORAL EVERY 6 HOURS PRN
Qty: 30 TABLET | Refills: 0 | Status: SHIPPED | OUTPATIENT
Start: 2025-08-26

## 2025-08-26 RX ORDER — PHENAZOPYRIDINE HYDROCHLORIDE 200 MG/1
200 TABLET, FILM COATED ORAL 3 TIMES DAILY PRN
Qty: 15 TABLET | Refills: 0 | Status: SHIPPED | OUTPATIENT
Start: 2025-08-26 | End: 2025-08-31

## 2025-08-26 RX ORDER — NITROFURANTOIN 25; 75 MG/1; MG/1
100 CAPSULE ORAL 2 TIMES DAILY
Qty: 10 CAPSULE | Refills: 0 | Status: SHIPPED | OUTPATIENT
Start: 2025-08-26 | End: 2025-08-31

## 2025-08-27 LAB — HOLD SPECIMEN: NORMAL

## 2025-08-28 ENCOUNTER — TELEPHONE (OUTPATIENT)
Dept: FAMILY MEDICINE | Facility: CLINIC | Age: 80
End: 2025-08-28
Payer: MEDICARE

## 2025-08-28 ENCOUNTER — TELEPHONE (OUTPATIENT)
Dept: OPHTHALMOLOGY | Facility: CLINIC | Age: 80
End: 2025-08-28
Payer: MEDICARE

## 2025-08-28 DIAGNOSIS — I50.32 CHRONIC HEART FAILURE WITH PRESERVED EJECTION FRACTION: ICD-10-CM

## 2025-08-28 DIAGNOSIS — Z79.899 ENCOUNTER FOR LONG-TERM (CURRENT) USE OF MEDICATIONS: ICD-10-CM

## 2025-08-28 DIAGNOSIS — E78.5 HYPERLIPIDEMIA ASSOCIATED WITH TYPE 2 DIABETES MELLITUS: Primary | ICD-10-CM

## 2025-08-28 DIAGNOSIS — I15.2 HYPERTENSION ASSOCIATED WITH DIABETES: ICD-10-CM

## 2025-08-28 DIAGNOSIS — E11.69 HYPERLIPIDEMIA ASSOCIATED WITH TYPE 2 DIABETES MELLITUS: Primary | ICD-10-CM

## 2025-08-28 DIAGNOSIS — E11.65 TYPE 2 DIABETES MELLITUS WITH HYPERGLYCEMIA, WITHOUT LONG-TERM CURRENT USE OF INSULIN: ICD-10-CM

## 2025-08-28 DIAGNOSIS — E11.59 HYPERTENSION ASSOCIATED WITH DIABETES: ICD-10-CM

## 2025-08-29 LAB — BACTERIA UR CULT: ABNORMAL

## 2025-09-03 ENCOUNTER — OFFICE VISIT (OUTPATIENT)
Dept: FAMILY MEDICINE | Facility: CLINIC | Age: 80
End: 2025-09-03
Payer: MEDICARE

## 2025-09-03 VITALS
HEART RATE: 82 BPM | RESPIRATION RATE: 16 BRPM | DIASTOLIC BLOOD PRESSURE: 62 MMHG | TEMPERATURE: 98 F | HEIGHT: 61 IN | OXYGEN SATURATION: 97 % | BODY MASS INDEX: 22.35 KG/M2 | WEIGHT: 118.38 LBS | SYSTOLIC BLOOD PRESSURE: 136 MMHG

## 2025-09-03 DIAGNOSIS — Z79.899 ENCOUNTER FOR LONG-TERM (CURRENT) USE OF MEDICATIONS: Chronic | ICD-10-CM

## 2025-09-03 DIAGNOSIS — E11.59 HYPERTENSION ASSOCIATED WITH DIABETES: Chronic | ICD-10-CM

## 2025-09-03 DIAGNOSIS — E78.5 HYPERLIPIDEMIA ASSOCIATED WITH TYPE 2 DIABETES MELLITUS: Chronic | ICD-10-CM

## 2025-09-03 DIAGNOSIS — I15.2 HYPERTENSION ASSOCIATED WITH DIABETES: Chronic | ICD-10-CM

## 2025-09-03 DIAGNOSIS — E11.69 HYPERLIPIDEMIA ASSOCIATED WITH TYPE 2 DIABETES MELLITUS: Chronic | ICD-10-CM

## 2025-09-03 DIAGNOSIS — Z87.440 HISTORY OF UTI: Primary | ICD-10-CM

## 2025-09-03 DIAGNOSIS — E11.65 TYPE 2 DIABETES MELLITUS WITH HYPERGLYCEMIA, WITHOUT LONG-TERM CURRENT USE OF INSULIN: Chronic | ICD-10-CM

## 2025-09-03 PROBLEM — M79.10 MYALGIA DUE TO STATIN: Status: RESOLVED | Noted: 2022-07-22 | Resolved: 2025-09-03

## 2025-09-03 PROBLEM — T46.6X5A MYALGIA DUE TO STATIN: Status: RESOLVED | Noted: 2022-07-22 | Resolved: 2025-09-03

## 2025-09-03 PROBLEM — Z78.0 MENOPAUSE: Status: RESOLVED | Noted: 2022-07-22 | Resolved: 2025-09-03

## 2025-09-03 LAB
BACTERIA #/AREA URNS HPF: ABNORMAL /HPF
BILIRUB UR QL STRIP.AUTO: NEGATIVE
CLARITY UR: CLEAR
COLOR UR AUTO: YELLOW
GLUCOSE UR QL STRIP: ABNORMAL
HGB UR QL STRIP: ABNORMAL
KETONES UR QL STRIP: NEGATIVE
LEUKOCYTE ESTERASE UR QL STRIP: NEGATIVE
MICROSCOPIC COMMENT: ABNORMAL
NITRITE UR QL STRIP: NEGATIVE
PH UR STRIP: 5 [PH]
PROT UR QL STRIP: NEGATIVE
RBC #/AREA URNS HPF: 4 /HPF (ref 0–4)
SP GR UR STRIP: 1.01
WBC #/AREA URNS HPF: 3 /HPF (ref 0–5)
YEAST URNS QL MICRO: ABNORMAL /HPF

## 2025-09-03 PROCEDURE — 81000 URINALYSIS NONAUTO W/SCOPE: CPT | Mod: HCNC,PO | Performed by: NURSE PRACTITIONER

## 2025-09-03 PROCEDURE — 99999 PR PBB SHADOW E&M-EST. PATIENT-LVL V: CPT | Mod: PBBFAC,HCNC,, | Performed by: NURSE PRACTITIONER

## 2025-09-04 LAB — HOLD SPECIMEN: NORMAL
